# Patient Record
Sex: MALE | Race: WHITE | Employment: OTHER | URBAN - METROPOLITAN AREA
[De-identification: names, ages, dates, MRNs, and addresses within clinical notes are randomized per-mention and may not be internally consistent; named-entity substitution may affect disease eponyms.]

---

## 2017-02-05 ENCOUNTER — OFFICE VISIT (OUTPATIENT)
Dept: URGENT CARE | Facility: RETAIL CLINIC | Age: 52
End: 2017-02-05
Payer: COMMERCIAL

## 2017-02-05 VITALS
SYSTOLIC BLOOD PRESSURE: 146 MMHG | DIASTOLIC BLOOD PRESSURE: 87 MMHG | OXYGEN SATURATION: 97 % | TEMPERATURE: 98.6 F | HEART RATE: 74 BPM

## 2017-02-05 DIAGNOSIS — R05.9 COUGH: Primary | ICD-10-CM

## 2017-02-05 DIAGNOSIS — J20.9 ACUTE BRONCHITIS WITH SYMPTOMS > 10 DAYS: ICD-10-CM

## 2017-02-05 PROCEDURE — 99213 OFFICE O/P EST LOW 20 MIN: CPT | Performed by: NURSE PRACTITIONER

## 2017-02-05 RX ORDER — CODEINE PHOSPHATE AND GUAIFENESIN 10; 100 MG/5ML; MG/5ML
1-2 SOLUTION ORAL EVERY 4 HOURS PRN
Qty: 120 ML | Refills: 0 | Status: SHIPPED | OUTPATIENT
Start: 2017-02-05 | End: 2017-08-14

## 2017-02-05 RX ORDER — AZITHROMYCIN 250 MG/1
TABLET, FILM COATED ORAL
Qty: 6 TABLET | Refills: 0 | Status: SHIPPED | OUTPATIENT
Start: 2017-02-05 | End: 2017-02-10

## 2017-02-05 NOTE — MR AVS SNAPSHOT
After Visit Summary   2/5/2017    Da Akins    MRN: [id number]           Patient Information     Date Of Birth          1965        Visit Information        Provider Department      2/5/2017 2:20 PM Tobin Valente APRN [facility]        Today's Diagnoses     Cough    -  1     Acute bronchitis with symptoms > 10 days            Follow-ups after your visit        Who to contact     You can reach your care team any time of the day by calling [phone].  Notification of test results:  If you have an abnormal lab result, we will notify you by phone as soon as possible.         Additional Information About Your Visit        MyChart Information     [facility] gives you secure access to your electronic health record. If you see a primary care provider, you can also send messages to your care team and make appointments. If you have questions, please call your primary care clinic.  If you do not have a primary care provider, please call [phone] and they will assist you.        Care EveryWhere ID     This is your Care EveryWhere ID. This could be used by other organizations to access your [place] medical records  [id number]        Your Vitals Were     Pulse Temperature Pulse Oximetry             74 98.6  F (37  C) (Oral) 97%          Blood Pressure from Last 3 Encounters:   02/05/17 146/87   04/15/16 130/94   11/30/15 132/78    Weight from Last 3 Encounters:   04/15/16 205 lb 4.8 oz (93.123 kg)   11/30/15 210 lb 8 oz (95.482 kg)              Today, you had the following     No orders found for display         Today's Medication Changes          These changes are accurate as of: 2/5/17  3:12 PM.  If you have any questions, ask your nurse or doctor.               Start taking these medicines.        Dose/Directions    azithromycin 250 MG tablet   Commonly known as:  ZITHROMAX   Used for:  Acute bronchitis with symptoms > 10 days   Started by:  Tobin Valente APRN  CNP        Take 2 tablets today, then take 1 tablet for the next 4 days.   Quantity:  6 tablet   Refills:  0       guaiFENesin-codeine 100-10 MG/5ML Soln solution   Commonly known as:  ROBITUSSIN AC   Used for:  Cough   Started by:  Tobin Valente APRN CNP        Dose:  1-2 tsp.   Take 5-10 mLs by mouth every 4 hours as needed for cough   Quantity:  120 mL   Refills:  0            Where to get your medicines      These medications were sent to [address] - [address]  [address], [facility] [address]     Phone:  [phone]    - azithromycin 250 MG tablet      Some of these will need a paper prescription and others can be bought over the counter.  Ask your nurse if you have questions.     Bring a paper prescription for each of these medications    - guaiFENesin-codeine 100-10 MG/5ML Soln solution             Primary Care Provider Office Phone #    [facility] [phone]       No address on file        Thank you!     Thank you for choosing [facility]  for your care. Our goal is always to provide you with excellent care. Hearing back from our patients is one way we can continue to improve our services. Please take a few minutes to complete the written survey that you may receive in the mail after your visit with us. Thank you!             Your Updated Medication List - Protect others around you: Learn how to safely use, store and throw away your medicines at www.disposemymeds.org.          This list is accurate as of: 2/5/17  3:12 PM.  Always use your most recent med list.                   Brand Name Dispense Instructions for use    azithromycin 250 MG tablet    ZITHROMAX    6 tablet    Take 2 tablets today, then take 1 tablet for the next 4 days.       guaiFENesin-codeine 100-10 MG/5ML Soln solution    ROBITUSSIN AC    120 mL    Take 5-10 mLs by mouth every 4 hours as needed for cough

## 2017-02-05 NOTE — PROGRESS NOTES
SUBJECTIVE:  Da Akins is a 51 year old male who presents to the clinic today with a chief complaint of cough  for 2 week(s).  His cough is described as productive of yellow sputum and spasmodic.    The patient's symptoms are mild and moderate and not changing over the course of time.  Associated symptoms include rhinorrhea. The patient's symptoms are exacerbated by no particular triggers  Patient has been using OTC cough suppressants  to improve symptoms.    Past Medical History   Diagnosis Date     Healthy adult      Current Outpatient Prescriptions   Medication Sig Dispense Refill     azithromycin (ZITHROMAX) 250 MG tablet Take 2 tablets today, then take 1 tablet for the next 4 days. 6 tablet 0     guaiFENesin-codeine (ROBITUSSIN AC) 100-10 MG/5ML SOLN solution Take 5-10 mLs by mouth every 4 hours as needed for cough 120 mL 0     History   Smoking status     Never Smoker    Smokeless tobacco     Never Used       ROS  Review of systems negative except as stated above.    OBJECTIVE:  /87 mmHg  Pulse 74  Temp(Src) 98.6  F (37  C) (Oral)  SpO2 97%  GENERAL APPEARANCE: alert, mild distress, moderate distress and cooperative  EYES: EOMI,  PERRL, conjunctiva clear  HENT: ear canals and TM's normal.  Nose and mouth without ulcers, erythema or lesions. Halitosis noted.  NECK: bilateral anterior cervical adenopathy  RESP: lungs clear to auscultation - no rales, rhonchi or wheezes  RESP: decreased breath sounds   CV: regular rates and rhythm, normal S1 S2, no murmur noted  ABDOMEN:  soft, nontender, no HSM or masses and bowel sounds normal  NEURO: Normal strength and tone, sensory exam grossly normal,  normal speech and mentation  SKIN: no suspicious lesions or rashes    ASSESSMENT:    Cough [R05]  Acute bronchitis with symptoms > 10 days [J20.9]    PLAN:  azithromycin (ZITHROMAX) 250 MG tablet  guaiFENesin-codeine (ROBITUSSIN AC) 100-10 MG/5ML SOLN solution  Get plenty of rest & drink plenty of fluids (mainly  water).  Take OTC, or medications prescribed to treat symptoms.  Mucinex is product known to help loosen congestion (generics are available.).   Dark Honey, such as Newman Wheat Honey has been shown to be helpful in cough management.  Avoid smoke (cigarettes or fireplace/wood burning stoves).  If you develop trouble breathing, swallowing or cough-up blood, immediately go to ER.  Using a vaporizer, humidifier, or steam from hot water to add moisture to the air can help  Follow-up with primary care provider if not improving with in 3 days or symptoms worsen.  A cough may last up to 2 weeks.    Tobin AMEZCUA, MSN, Family NP-C  [facility]  February 5, 2017

## 2017-08-14 ENCOUNTER — HOSPITAL ENCOUNTER (OUTPATIENT)
Dept: ULTRASOUND IMAGING | Facility: CLINIC | Age: 52
Discharge: HOME | End: 2017-08-14
Attending: FAMILY MEDICINE | Admitting: FAMILY MEDICINE
Payer: COMMERCIAL

## 2017-08-14 ENCOUNTER — HOSPITAL ENCOUNTER (OUTPATIENT)
Dept: GENERAL RADIOLOGY | Facility: CLINIC | Age: 52
Discharge: HOME | End: 2017-08-14
Attending: FAMILY MEDICINE | Admitting: FAMILY MEDICINE
Payer: COMMERCIAL

## 2017-08-14 ENCOUNTER — OFFICE VISIT (OUTPATIENT)
Dept: FAMILY MEDICINE | Facility: CLINIC | Age: 52
End: 2017-08-14
Payer: COMMERCIAL

## 2017-08-14 VITALS
BODY MASS INDEX: 32.3 KG/M2 | SYSTOLIC BLOOD PRESSURE: 146 MMHG | DIASTOLIC BLOOD PRESSURE: 98 MMHG | TEMPERATURE: 95.1 F | RESPIRATION RATE: 20 BRPM | WEIGHT: 201 LBS | HEIGHT: 66 IN | OXYGEN SATURATION: 97 % | HEART RATE: 78 BPM

## 2017-08-14 DIAGNOSIS — Q53.112 UNILATERAL INGUINAL TESTIS: ICD-10-CM

## 2017-08-14 DIAGNOSIS — M25.551 HIP PAIN, RIGHT: ICD-10-CM

## 2017-08-14 DIAGNOSIS — M25.551 HIP PAIN, RIGHT: Primary | ICD-10-CM

## 2017-08-14 DIAGNOSIS — B35.6 TINEA CRURIS: ICD-10-CM

## 2017-08-14 PROCEDURE — 99214 OFFICE O/P EST MOD 30 MIN: CPT | Performed by: FAMILY MEDICINE

## 2017-08-14 PROCEDURE — 72170 X-RAY EXAM OF PELVIS: CPT | Mod: TC

## 2017-08-14 PROCEDURE — 93976 VASCULAR STUDY: CPT

## 2017-08-14 RX ORDER — KETOCONAZOLE 20 MG/G
CREAM TOPICAL 2 TIMES DAILY
Qty: 15 G | Refills: 1 | Status: SHIPPED | OUTPATIENT
Start: 2017-08-14 | End: 2019-03-29

## 2017-08-14 RX ORDER — NAPROXEN 500 MG/1
500 TABLET ORAL 2 TIMES DAILY PRN
Qty: 60 TABLET | Refills: 0 | Status: SHIPPED | OUTPATIENT
Start: 2017-08-14 | End: 2017-09-10

## 2017-08-14 ASSESSMENT — PAIN SCALES - GENERAL: PAINLEVEL: MODERATE PAIN (4)

## 2017-08-14 NOTE — NURSING NOTE
"Chief Complaint   Patient presents with     Groin Pain       Initial BP (!) 146/98  Pulse 78  Temp 95.1  F (35.1  C) (Tympanic)  Resp 20  Ht 5' 6\" (1.676 m)  Wt 201 lb (91.2 kg)  SpO2 97%  BMI 32.44 kg/m2 Estimated body mass index is 32.44 kg/(m^2) as calculated from the following:    Height as of this encounter: 5' 6\" (1.676 m).    Weight as of this encounter: 201 lb (91.2 kg).  Medication Reconciliation: complete   ................Charbel Purvis LPN,   August 14, 2017,      7:53 AM,   [facility]    "

## 2017-08-14 NOTE — PROGRESS NOTES
SUBJECTIVE:                                                    aD Akins is a 52 year old male who presents to clinic today for the following health issues:      RIGHT GROIN PAIN/HERNIA      Duration: 6 months    Description (location/character/radiation): pain upper right groin    Intensity:  moderate    Accompanying signs and symptoms: hernia (per DOT phys 2 mo ago)    History (similar episodes/previous evaluation): None    Precipitating or alleviating factors: none    Therapies tried and outcome:  ibuprofen helps     Da is here with his wife for the right groin pain in the last 6 months and it is getting worse.  Had a DOT physical in 2 months ago, umbilical hernia noted - not sure it is related to the groin pain. Stated that he passed everything else for his DOT.   Started having the groin pain about 6 months ago. Localized to the right side. Comes and goes, stronger at times. Sleeping on the right side makes it worse; improve when lays on the left side. Not bother with lifting or straining activities.  No mass appreciated on the groin.  Activities involved  with twisting the hip or lifting the leg would make it worse. Not able bike due to pain.  No unusual activities or injury.  No chronic back pain.  Pain radiates to knees at times.  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  No problem with walking. Hurts more with extensive walking, especially with going up and down the stairs. No fever or chill.  OTC meds - helps some.  No fever or chill.  Left hips feels stiff.      Problem list and histories reviewed & adjusted, as indicated.  Additional history: as documented    No current outpatient prescriptions on file.     Allergies   Allergen Reactions     Seasonal Allergies        Reviewed and updated as needed this visit by clinical staffTobacco  Allergies  Meds  Med Hx  Soc Hx      Reviewed and updated as needed this visit by Provider         ROS:  Constitutional, HEENT, cardiovascular, pulmonary,  "gi and gu systems are negative, except as otherwise noted.      OBJECTIVE:   BP (!) 146/98  Pulse 78  Temp 95.1  F (35.1  C) (Tympanic)  Resp 20  Ht 5' 6\" (1.676 m)  Wt 201 lb (91.2 kg)  SpO2 97%  BMI 32.44 kg/m2  Body mass index is 32.44 kg/(m^2).  GENERAL: healthy, alert and no distress  RESP: lungs clear to auscultation - no rales, rhonchi or wheezes  CV: regular rate and rhythm, no murmur  ABDOMEN: soft, nontender, no masses and bowel sounds normal   (male): normal male genitalia without lesions or urethral discharge, no hernia.  Rash on right groin consistent with tinea cruris noted. Right testicle was could not palpate.   MS: no gross musculoskeletal defects noted, no edema. Walk without limping.  Both legs are equally in strength.  Knee and lower back exams wee normall.  Right hips is stiff with limited range of motion as compared to the left hip.  Tender with palpation to the right hip joint which also triggered with internal and external rotation of the hips. No tender wit palpation to the trochanter.    NEURO: Normal strength and tone, mentation intact and speech normal. No focal neurological deficit.    Diagnostic Test Results:  No results found for this or any previous visit (from the past 24 hour(s)).    ASSESSMENT/PLAN:   1. Hip pain, right  Most likely due to arthritis. He was informed that he has nor hernia and his pain was not related to his umbilical hernia.  Discussed with him about the nature of the condition.  Normal activities as tolerated.  Naproxen as needed.  Xray of the hips today. If no acute change, will consider PT.     - XR Pelvis 1/2 Views; Future  - naproxen (NAPROSYN) 500 MG tablet; Take 1 tablet (500 mg) by mouth 2 times daily as needed for moderate pain  Dispense: 60 tablet; Refill: 0    2. Tinea cruris  Discussed with Da about the nature of the condition.  Inform him that he has a fungal infection which is most likely form the excessive moist from the sweat. Reassure " him that it is benign in nature and is not caused by hygiene problem.  Will have him try Ketoconazole cream twice a day.  Call in if not improve or worse.  He feels comfortable with the plan and all of his questions were answered.    - ketoconazole (NIZORAL) 2 % cream; Apply topically 2 times daily  Dispense: 15 g; Refill: 1    3. Unilateral inguinal testis  Unable to feel the right testicle today.  Chart reviewed, both testicles were appreciated at the last physical. Ultrasound ordered today and will go from there.    - US Testicular & Scrotum w Doppler Ltd; Future      David Ackerman Mai, MD  [facility]

## 2017-08-14 NOTE — MR AVS SNAPSHOT
After Visit Summary   8/14/2017    Da Akins    MRN: [id number]           Patient Information     Date Of Birth          1965        Visit Information        Provider Department      8/14/2017 7:40 AM David Romero MD [facility]        Today's Diagnoses     Hip pain, right    -  1    Tinea cruris        Unilateral inguinal testis           Follow-ups after your visit        Follow-up notes from your care team     Return if symptoms worsen or fail to improve.      Future tests that were ordered for you today     Open Future Orders        Priority Expected Expires Ordered    US Testicular & Scrotum w Doppler Ltd Routine  8/14/2018 8/14/2017    XR Pelvis 1/2 Views Routine 8/14/2017 8/14/2018 8/14/2017            Who to contact     If you have questions or need follow up information about today's clinic visit or your schedule please contact [facility] directly at [phone].  Normal or non-critical lab and imaging results will be communicated to you by MyChart, letter or phone within 4 business days after the clinic has received the results. If you do not hear from us within 7 days, please contact the clinic through wikifoliohart or phone. If you have a critical or abnormal lab result, we will notify you by phone as soon as possible.  Submit refill requests through [employer] or call your pharmacy and they will forward the refill request to us. Please allow 3 business days for your refill to be completed.          Additional Information About Your Visit        MyChart Information     [employer] gives you secure access to your electronic health record. If you see a primary care provider, you can also send messages to your care team and make appointments. If you have questions, please call your primary care clinic.  If you do not have a primary care provider, please call [phone] and they will assist you.        Care EveryWhere ID     This is your Care EveryWhere ID. This  "could be used by other organizations to access your [place] medical records  [id number]        Your Vitals Were     Pulse Temperature Respirations Height Pulse Oximetry BMI (Body Mass Index)    78 95.1  F (35.1  C) (Tympanic) 20 5' 6\" (1.676 m) 97% 32.44 kg/m2       Blood Pressure from Last 3 Encounters:   08/14/17 (!) 146/98   02/05/17 146/87   04/15/16 (!) 130/94    Weight from Last 3 Encounters:   08/14/17 201 lb (91.2 kg)   04/15/16 205 lb 4.8 oz (93.1 kg)   11/30/15 210 lb 8 oz (95.5 kg)                 Today's Medication Changes          These changes are accurate as of: 8/14/17 11:59 PM.  If you have any questions, ask your nurse or doctor.               Start taking these medicines.        Dose/Directions    ketoconazole 2 % cream   Commonly known as:  NIZORAL   Used for:  Tinea cruris   Started by:  David Romero MD        Apply topically 2 times daily   Quantity:  15 g   Refills:  1       naproxen 500 MG tablet   Commonly known as:  NAPROSYN   Used for:  Hip pain, right   Started by:  David Romero MD        Dose:  500 mg   Take 1 tablet (500 mg) by mouth 2 times daily as needed for moderate pain   Quantity:  60 tablet   Refills:  0            Where to get your medicines      These medications were sent to [facility] [address] [facility] - [place], [place] - [address]  [address], [facility] [address]     Phone:  [phone]     ketoconazole 2 % cream    naproxen 500 MG tablet                Primary Care Provider Office Phone #    [facility] [phone]       No address on file        Equal Access to Services     CINDI CORDOBA AH: Sherif Jensen, wakrishnada luqadaha, qaybta kaalmada adeegyada, patrick arguelles. So [facility] [phone].    ATENCIÓN: Si habla español, tiene a abbasi disposición servicios gratuitos de asistencia lingüística. Llame al [phone].    We comply with applicable federal civil rights laws and [place] laws. We do not discriminate on " the basis of race, color, national origin, age, disability sex, sexual orientation or gender identity.            Thank you!     Thank you for choosing [facility]  for your care. Our goal is always to provide you with excellent care. Hearing back from our patients is one way we can continue to improve our services. Please take a few minutes to complete the written survey that you may receive in the mail after your visit with us. Thank you!             Your Updated Medication List - Protect others around you: Learn how to safely use, store and throw away your medicines at www.disposemymeds.org.          This list is accurate as of: 8/14/17 11:59 PM.  Always use your most recent med list.                   Brand Name Dispense Instructions for use Diagnosis    ketoconazole 2 % cream    NIZORAL    15 g    Apply topically 2 times daily    Tinea cruris       naproxen 500 MG tablet    NAPROSYN    60 tablet    Take 1 tablet (500 mg) by mouth 2 times daily as needed for moderate pain    Hip pain, right

## 2017-08-17 ENCOUNTER — MYC MEDICAL ADVICE (OUTPATIENT)
Dept: FAMILY MEDICINE | Facility: CLINIC | Age: 52
End: 2017-08-17

## 2017-08-17 DIAGNOSIS — M25.551 HIP PAIN, RIGHT: Primary | ICD-10-CM

## 2017-08-18 ENCOUNTER — MYC MEDICAL ADVICE (OUTPATIENT)
Dept: FAMILY MEDICINE | Facility: CLINIC | Age: 52
End: 2017-08-18

## 2017-08-18 DIAGNOSIS — M16.11 ARTHRITIS OF RIGHT HIP: Primary | ICD-10-CM

## 2017-08-18 DIAGNOSIS — Z30.09 VISIT FOR VASECTOMY EVALUATION: ICD-10-CM

## 2017-08-18 NOTE — TELEPHONE ENCOUNTER
[employer] message was sent to patient about the vasectomy PT was pended awaiting Dr. Romero's approval.  Last note stated if there was no ACUTE changes PT would be considered.      Jazmin Garcia, CMA

## 2017-09-05 ENCOUNTER — HOSPITAL ENCOUNTER (OUTPATIENT)
Dept: PHYSICAL THERAPY | Facility: CLINIC | Age: 52
Setting detail: THERAPIES SERIES
End: 2017-09-05
Attending: FAMILY MEDICINE
Payer: COMMERCIAL

## 2017-09-05 PROCEDURE — 40000718 ZZHC STATISTIC PT DEPARTMENT ORTHO VISIT: Performed by: PHYSICAL THERAPIST

## 2017-09-05 PROCEDURE — 97110 THERAPEUTIC EXERCISES: CPT | Mod: GP | Performed by: PHYSICAL THERAPIST

## 2017-09-05 PROCEDURE — 97161 PT EVAL LOW COMPLEX 20 MIN: CPT | Mod: GP | Performed by: PHYSICAL THERAPIST

## 2017-09-05 PROCEDURE — 97530 THERAPEUTIC ACTIVITIES: CPT | Mod: GP | Performed by: PHYSICAL THERAPIST

## 2017-09-05 NOTE — PROGRESS NOTES
09/05/17 0800   General Information   Type of Visit Initial OP Ortho PT Evaluation   Start of Care Date 09/05/17   Referring Physician Dr. 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   Orders Evaluate and Treat   Date of Order 08/21/17   Insurance Type Other   Insurance Comments/Visits Authorized [employer] care   Medical Diagnosis Hip pain   Surgical/Medical history reviewed Yes   Precautions/Limitations no known precautions/limitations   Body Part(s)   Body Part(s) Hip   Presentation and Etiology   Pertinent history of current problem (include personal factors and/or comorbidities that impact the POC) Pt reports that he gets pain in his R hip/ groin area.  He noted this because this summer when he went mountain biking it was hard to pedal. On his DOT physical he was found to have a L umbilical hernia.  He notes that when he is driving sometimes he has pain down the anterior thigh and about the knee. Occasionally it will hurt with walking (painful at end of a day at fair), R leg more difficult to flex in sitting. denies T/N,  does endorse the R ankle getting sore at the end of the day,  Getting in/ out of his smaller vehicle is difficult.,  Lying on stomach or R side when sleeping is painful,  occasionally wakes him at night (1x/week)   Impairments A. Pain;D. Decreased ROM;E. Decreased flexibility;F. Decreased strength and endurance;H. Impaired gait   Functional Limitations perform activities of daily living;perform required work activities;perform desired leisure / sports activities   Symptom Location R hip and anterior thigh to the knee, very occasionally ankle   How/Where did it occur From insidious onset   Onset date of current episode/exacerbation 01/05/17   Chronicity Chronic   Pain rating (0-10 point scale) Best (/10);Worst (/10)   Best (/10) 0/10   Worst (/10) 6/10   Pain quality C. Aching;B. Dull   Frequency of pain/symptoms C. With activity   Pain/symptoms are: The  same all the time   Pain/symptoms exacerbated by B. Walking;G. Certain positions;A. Sitting;K. Home tasks;L. Work tasks   Pain/symptoms eased by A. Sitting;E. Changing positions;F. Certain positions;I. OTC medication(s);C. Rest   Progression of symptoms since onset: Unchanged   Current / Previous Interventions   Diagnostic Tests: X-ray   X-ray Results Results   X-ray results OA   Prior Level of Function   Prior Level of Function-Mobility indep   Prior Level of Function-ADLs indep   Functional Level Prior Comment indep   Current Level of Function   Current Community Support Family/friend caregiver   Patient role/employment history A. Employed   Employment Comments Drives a truck   Living environment House/townhome   Home/community accessibility no concerns   Current equipment-Gait/Locomotion None   Current equipment-ADL None   Fall Risk Screen   Fall screen completed by PT   Per patient - Fall 2 or more times in past year? No   Per patient - Fall with injury in past year? No   Is patient a fall risk? No   Hip Objective Findings   Side (if bilateral, select both right and left) Right   Integumentary  no concerns   Posture grossly WFL, decreased lumbar lordosis   Gait/Locomotion decreased truck rotation    Balance/Proprioception (Single Leg Stance) WNL   Lumbar ROM WNL decreased extn but no pain in any direction   Functional Closed Chain Screen squat test negative   Hip/Knee Strength Comments DF and PF 5/5    Femoral Nerve Stretch Test negative   Straight Leg Raise Test laminhfield positive   Scour Test positive   ALEX Test positive   FADIR Test positive   Hip Special Tests Comments negative test for labral tear    Palpation Not TTP at GT or ITB.    Right Hip Flexion PROM pain free   Right Hip Abduction PROM 45 deg L and 15 deg on R   Right Hip ER PROM 36 degrees on L and 26 degrees on R   Right Hip IR PROM 32 deg on L and 20 degrees on R   Right Hip Flexion Strength painful and decreased 3-/5 on R 5/5 on L   Right  Hip Extension Strength able to rise from a squat   Right Knee Flexion Strength 5/5   Right Knee Extension Strength 5/5   Right Prone Quad Flexibility tigh (B)   Planned Therapy Interventions   Planned Therapy Interventions manual therapy;joint mobilization;ROM;strengthening;stretching   Planned Modality Interventions   Planned Modality Interventions Cryotherapy   Planned Modality Interventions Comments PRN   Clinical Impression   Criteria for Skilled Therapeutic Interventions Met yes, treatment indicated   PT Diagnosis R hip pain and decreased ROM  and decreased strength consistent with R hip OA   Influenced by the following impairments pain, decreased ROM, decreased strength   Functional limitations due to impairments pain with fucntional tasks ADLS and IADLS and giat   Clinical Presentation Stable/Uncomplicated   Clinical Presentation Rationale no comorbidities noted, otherwise healthy   Clinical Decision Making (Complexity) Low complexity   Therapy Frequency 2 times/Week   Predicted Duration of Therapy Intervention (days/wks) 8 weeks   Risk & Benefits of therapy have been explained Yes   Patient, Family & other staff in agreement with plan of care Yes   Clinical Impression Comments Pt presents with R hip pain and decreased ROM  and decreased strength consistent with R hip OA. Pt will benefit from skilled PT for instructionin HEP for stretching and strengthenwing as well as neuromuscular re-ed. He will also benefit from skilled application of manual techniques and modalities to decrease pain and imporve mobility   Education Assessment   Preferred Learning Style Listening   Barriers to Learning No barriers   ORTHO GOALS   PT Ortho Eval Goals 1;2;3   Ortho Goal 1   Goal Identifier Sleeping   Goal Description Pt able to report able to sleep through the night in his usual way with out being wakened by hip pain x 2 weeks.    Target Date 11/04/17   Ortho Goal 2   Goal Identifier Getting into/out of a car   Goal  Description Pt able to get into and out of both is truck and his personal vehicle with out hip pain   Target Date 11/04/17   Ortho Goal 3   Goal Identifier Walking   Goal Description Pt able to walk x 1 mile with no increase in hip pain during or after his walk   Target Date 11/04/17   Total Evaluation Time   Total Evaluation Time 50

## 2017-09-10 DIAGNOSIS — M25.551 HIP PAIN, RIGHT: ICD-10-CM

## 2017-09-11 NOTE — TELEPHONE ENCOUNTER
naproxen (NAPROSYN) 500 MG tablet      Last Written Prescription Date: 8/14/17  Last Quantity: 60, # refills: 0  Last Office Visit with G, P or [facility] prescribing provider: 8/14/17       Creatinine   Date Value Ref Range Status   04/15/2016 0.99 0.66 - 1.25 mg/dL Final     Lab Results   Component Value Date    AST 22 04/15/2016     Lab Results   Component Value Date    ALT 46 04/15/2016     BP Readings from Last 3 Encounters:   08/14/17 (!) 146/98   02/05/17 146/87   04/15/16 (!) 130/94

## 2017-09-13 ENCOUNTER — HOSPITAL ENCOUNTER (OUTPATIENT)
Dept: PHYSICAL THERAPY | Facility: CLINIC | Age: 52
Setting detail: THERAPIES SERIES
End: 2017-09-13
Attending: FAMILY MEDICINE
Payer: COMMERCIAL

## 2017-09-13 PROCEDURE — 97110 THERAPEUTIC EXERCISES: CPT | Mod: GP | Performed by: PHYSICAL THERAPIST

## 2017-09-13 PROCEDURE — 97140 MANUAL THERAPY 1/> REGIONS: CPT | Mod: GP | Performed by: PHYSICAL THERAPIST

## 2017-09-13 PROCEDURE — 40000718 ZZHC STATISTIC PT DEPARTMENT ORTHO VISIT: Performed by: PHYSICAL THERAPIST

## 2017-09-13 RX ORDER — NAPROXEN 500 MG/1
TABLET ORAL
Qty: 60 TABLET | Refills: 0 | Status: SHIPPED | OUTPATIENT
Start: 2017-09-13 | End: 2017-10-11

## 2017-09-13 NOTE — TELEPHONE ENCOUNTER
Medication is being filled for 1 time refill only due to:  Patient needs labs Cr+, AST/ ALT. Future labs ordered as listed.  NOtified per comment section of Rx needs for lab work . (Need these checked annually for med use)  Will forward to schedulers to call and set up appt and lab appt......................VENKATA Osorio

## 2017-09-14 NOTE — TELEPHONE ENCOUNTER
Left message for patient to call back and speak with any .  Thank you,  Rocio Tatum  Patient Representative

## 2017-09-18 ENCOUNTER — TELEPHONE (OUTPATIENT)
Dept: FAMILY MEDICINE | Facility: CLINIC | Age: 52
End: 2017-09-18

## 2017-09-18 DIAGNOSIS — Z12.11 SCREENING FOR COLON CANCER: Primary | ICD-10-CM

## 2017-09-19 ENCOUNTER — HOSPITAL ENCOUNTER (OUTPATIENT)
Dept: PHYSICAL THERAPY | Facility: CLINIC | Age: 52
Setting detail: THERAPIES SERIES
End: 2017-09-19
Attending: FAMILY MEDICINE
Payer: COMMERCIAL

## 2017-09-19 ENCOUNTER — MYC MEDICAL ADVICE (OUTPATIENT)
Dept: FAMILY MEDICINE | Facility: CLINIC | Age: 52
End: 2017-09-19

## 2017-09-19 PROCEDURE — 97140 MANUAL THERAPY 1/> REGIONS: CPT | Mod: GP | Performed by: PHYSICAL THERAPIST

## 2017-09-19 PROCEDURE — 97110 THERAPEUTIC EXERCISES: CPT | Mod: GP | Performed by: PHYSICAL THERAPIST

## 2017-09-19 PROCEDURE — 40000718 ZZHC STATISTIC PT DEPARTMENT ORTHO VISIT: Performed by: PHYSICAL THERAPIST

## 2017-09-19 NOTE — TELEPHONE ENCOUNTER
Patient returned call and informed of appointment. Asked that we also send a MyChart message of the appointment as well. Gema Meraz LPN

## 2017-09-19 NOTE — TELEPHONE ENCOUNTER
Message left to return call to clinic to inform of consult that he was requesting. Please inform of appointment with Urology, Dr. Escalante being scheduled on Wednesday 9/27 at 11:30 am at the [facility].  Gema Meraz LPN

## 2017-09-21 ENCOUNTER — HOSPITAL ENCOUNTER (OUTPATIENT)
Dept: PHYSICAL THERAPY | Facility: CLINIC | Age: 52
Setting detail: THERAPIES SERIES
End: 2017-09-21
Attending: FAMILY MEDICINE
Payer: COMMERCIAL

## 2017-09-21 PROCEDURE — 97140 MANUAL THERAPY 1/> REGIONS: CPT | Mod: GP | Performed by: PHYSICAL THERAPIST

## 2017-09-21 PROCEDURE — 40000718 ZZHC STATISTIC PT DEPARTMENT ORTHO VISIT: Performed by: PHYSICAL THERAPIST

## 2017-09-21 PROCEDURE — 97110 THERAPEUTIC EXERCISES: CPT | Mod: GP | Performed by: PHYSICAL THERAPIST

## 2017-09-26 ENCOUNTER — HOSPITAL ENCOUNTER (OUTPATIENT)
Dept: PHYSICAL THERAPY | Facility: CLINIC | Age: 52
Setting detail: THERAPIES SERIES
End: 2017-09-26
Attending: FAMILY MEDICINE
Payer: COMMERCIAL

## 2017-09-26 PROCEDURE — 97140 MANUAL THERAPY 1/> REGIONS: CPT | Mod: GP | Performed by: PHYSICAL THERAPIST

## 2017-09-26 PROCEDURE — 97110 THERAPEUTIC EXERCISES: CPT | Mod: GP | Performed by: PHYSICAL THERAPIST

## 2017-09-26 PROCEDURE — 40000718 ZZHC STATISTIC PT DEPARTMENT ORTHO VISIT: Performed by: PHYSICAL THERAPIST

## 2017-09-27 ENCOUNTER — HOSPITAL ENCOUNTER (OUTPATIENT)
Dept: PHYSICAL THERAPY | Facility: CLINIC | Age: 52
Setting detail: THERAPIES SERIES
End: 2017-09-27
Attending: FAMILY MEDICINE
Payer: COMMERCIAL

## 2017-09-27 ENCOUNTER — OFFICE VISIT (OUTPATIENT)
Dept: UROLOGY | Facility: OTHER | Age: 52
End: 2017-09-27
Payer: COMMERCIAL

## 2017-09-27 VITALS
DIASTOLIC BLOOD PRESSURE: 78 MMHG | WEIGHT: 211.25 LBS | BODY MASS INDEX: 33.95 KG/M2 | SYSTOLIC BLOOD PRESSURE: 142 MMHG | HEIGHT: 66 IN

## 2017-09-27 DIAGNOSIS — Z30.09 STERILIZATION CONSULT: Primary | ICD-10-CM

## 2017-09-27 DIAGNOSIS — R03.0 ELEVATED BLOOD PRESSURE READING WITHOUT DIAGNOSIS OF HYPERTENSION: ICD-10-CM

## 2017-09-27 PROCEDURE — 99203 OFFICE O/P NEW LOW 30 MIN: CPT | Performed by: UROLOGY

## 2017-09-27 PROCEDURE — 40000718 ZZHC STATISTIC PT DEPARTMENT ORTHO VISIT: Performed by: PHYSICAL THERAPIST

## 2017-09-27 PROCEDURE — 97140 MANUAL THERAPY 1/> REGIONS: CPT | Mod: GP | Performed by: PHYSICAL THERAPIST

## 2017-09-27 PROCEDURE — 97110 THERAPEUTIC EXERCISES: CPT | Mod: GP | Performed by: PHYSICAL THERAPIST

## 2017-09-27 NOTE — PATIENT INSTRUCTIONS
Your Vasectomy is scheduled 10/25/17 at 8:15.  Please call [phone] if you need to reschedule this appointment or if you have any question.   Please stop naproxen on 10/17/17.    Preparation for Vasectomy:  Shave the hair away from the base of your penis and around your testicles.  Wear snug underwear the day of the vasectomy to support your testicles.  Do not take aspirin, ibuprofen, advil, motrin, aleve products one week prior to your vasectomy.  Arrange for a  if you take any medication for relaxation from the physician.      General Vasectomy Information    Vasectomy is a surgery.  If it is successful, it will be impossible for you to ever father children.  The following information regards the male sterilization done by an operation called a vasectomy.  This is done in the physician's office.    The operation done to sterilize the male is easier, safer and much less expensive than the operation done to sterilize the woman.    Sterilization should be considered permanent.  For most males, once the operation is done, it can never me undone.  There are attempts made occasionally to reconnect the tubes that have been cut during the procedure, but this is very difficult and expensive and works only about 50-70% of the time.  In order for any of the physicians in our clinic to do a vasectomy, we require that you consider this a permanent form a sterilization.    A vasectomy can be done at any time, but it is best to think about having it done when you can take at least one day off from work and any excess activities.    Your decision to have a vasectomy should only be made with the following facts clear in mind.    1. First, a vasectomy is only one of several means of birth control.  Many form of temporary contraception are available.  If you have any questions about other methods, please discuss this with your physician.    2. A vasectomy may be unsuccessful in approximately one out of 1000 couples per  year.  This occurs when the tubes which are cut during the procedure reconnect themselves.  Sterility cannot be guaranteed.    3. You should be aware that it is the current belief of the medical profession that a vasectomy procedure does not alter a male physically, physiologically or sexually.  Because each person is a unique individual, there is always the possibility of an adverse phychiatric reaction.  This can be best avoided by being very comfortable in your own mind that you want to have this done, and that you do not want to father any children in the future.  If this is not clear in your mind, this should be further discussed with your physician.    4. You will not notice a change in the volume of your ejaculate since sperm is a very small amount of the semen and it is only the sperm that is stopped from entering the ejaculate after a vasectomy.  Your prostate and seminal vesicle glands really supply most of the semen and this is not at all decreased after a vasectomy.  Also there is no effect on the male hormones.    5. You do not become sterile immediately following a vasectomy due to the fact that there is still sperm remaining in your system that must be eliminated by ejaculation.  For this reason, your sexual partner could still become pregnant for a period of time following the vasectomy operation.  It is necessary that contraceptive measures be used until you receive confirmation from your physician that you are sterile.  It takes approximately 12 ejaculations to clear the semen of sperm, but this can differ in different men.  For this reason, it is very important that your semen be checked for sperm before you are considered sterile, and this should be done approximately 12 weeks after your vasectomy.      6. Vasectomy has risks and benefits.  Among the risks are possible complications resulting from the procedure.  These risks include but are not limited to:   A.  Bleeding, infection, or hematoma  occuring during or in the recovery period   from the procedure.   B.  Sperm granuloma or a small pea to walnut sized lump which is a collection of   scar tissue and sperm in your scrotal sack and remains permanently   C.  There may be an increased risk of prostate cancer although the data is   unclear.

## 2017-09-27 NOTE — NURSING NOTE
"Chief Complaint   Patient presents with     Consult     vasectomy        Initial /78 (BP Location: Right arm, Patient Position: Chair, Cuff Size: Adult Regular)  Ht 5' 6\" (1.676 m)  Wt 211 lb 4 oz (95.8 kg)  BMI 34.1 kg/m2 Estimated body mass index is 34.1 kg/(m^2) as calculated from the following:    Height as of this encounter: 5' 6\" (1.676 m).    Weight as of this encounter: 211 lb 4 oz (95.8 kg).  Medication Reconciliation: complete       Mariposa Centeno CMA         "

## 2017-09-27 NOTE — MR AVS SNAPSHOT
After Visit Summary   9/27/2017    Da Akins    MRN: [id number]           Patient Information     Date Of Birth          1965        Visit Information        Provider Department      9/27/2017 11:30 AM Praveen Esclaante MD [facility]        Today's Diagnoses     Sterilization consult    -  1      Care Instructions    Your Vasectomy is scheduled 10/25/17 at 8:15.  Please call [phone] if you need to reschedule this appointment or if you have any question.   Please stop naproxen on 10/17/17.    Preparation for Vasectomy:  Shave the hair away from the base of your penis and around your testicles.  Wear snug underwear the day of the vasectomy to support your testicles.  Do not take aspirin, ibuprofen, advil, motrin, aleve products one week prior to your vasectomy.  Arrange for a  if you take any medication for relaxation from the physician.      General Vasectomy Information    Vasectomy is a surgery.  If it is successful, it will be impossible for you to ever father children.  The following information regards the male sterilization done by an operation called a vasectomy.  This is done in the physician's office.    The operation done to sterilize the male is easier, safer and much less expensive than the operation done to sterilize the woman.    Sterilization should be considered permanent.  For most males, once the operation is done, it can never me undone.  There are attempts made occasionally to reconnect the tubes that have been cut during the procedure, but this is very difficult and expensive and works only about 50-70% of the time.  In order for any of the physicians in our clinic to do a vasectomy, we require that you consider this a permanent form a sterilization.    A vasectomy can be done at any time, but it is best to think about having it done when you can take at least one day off from work and any excess activities.    Your decision to have a vasectomy  should only be made with the following facts clear in mind.    1. First, a vasectomy is only one of several means of birth control.  Many form of temporary contraception are available.  If you have any questions about other methods, please discuss this with your physician.    2. A vasectomy may be unsuccessful in approximately one out of 1000 couples per year.  This occurs when the tubes which are cut during the procedure reconnect themselves.  Sterility cannot be guaranteed.    3. You should be aware that it is the current belief of the medical profession that a vasectomy procedure does not alter a male physically, physiologically or sexually.  Because each person is a unique individual, there is always the possibility of an adverse phychiatric reaction.  This can be best avoided by being very comfortable in your own mind that you want to have this done, and that you do not want to father any children in the future.  If this is not clear in your mind, this should be further discussed with your physician.    4. You will not notice a change in the volume of your ejaculate since sperm is a very small amount of the semen and it is only the sperm that is stopped from entering the ejaculate after a vasectomy.  Your prostate and seminal vesicle glands really supply most of the semen and this is not at all decreased after a vasectomy.  Also there is no effect on the male hormones.    5. You do not become sterile immediately following a vasectomy due to the fact that there is still sperm remaining in your system that must be eliminated by ejaculation.  For this reason, your sexual partner could still become pregnant for a period of time following the vasectomy operation.  It is necessary that contraceptive measures be used until you receive confirmation from your physician that you are sterile.  It takes approximately 12 ejaculations to clear the semen of sperm, but this can differ in different men.  For this reason, it is  very important that your semen be checked for sperm before you are considered sterile, and this should be done approximately 12 weeks after your vasectomy.      6. Vasectomy has risks and benefits.  Among the risks are possible complications resulting from the procedure.  These risks include but are not limited to:   A.  Bleeding, infection, or hematoma occuring during or in the recovery period   from the procedure.   B.  Sperm granuloma or a small pea to walnut sized lump which is a collection of   scar tissue and sperm in your scrotal sack and remains permanently   C.  There may be an increased risk of prostate cancer although the data is   unclear.            Follow-ups after your visit        Your next 10 appointments already scheduled     Oct 04, 2017  8:00 AM CDT   Ortho Treatment with Karen Concepcion PT   [facility] ([facility])    [address] [place] [address] [address] [place] [address]   [phone]            Oct 11, 2017  8:00 AM CDT   PHYSICAL with David Ackerman Mai, MD   [facility]    [address] [address]   [phone]            Oct 11, 2017  8:45 AM CDT   Ortho Treatment with Karen Concepcion PT   [facility] ([facility])    [address] [place] [address]   [phone]            Oct 18, 2017  8:00 AM CDT   Ortho Treatment with Karen Concepcion PT   [facility] ([facility])    [address] [place] [address]   [phone]            Oct 23, 2017   Procedure with Clay Brown MD   [facility] ([facility])    [address] [address]   [phone]            Oct 25, 2017  8:15 AM CDT   Return Visit with Parveen Escalante MD   [facility]    [address] [facility] [id number] [phone]              Who to  "contact     If you have questions or need follow up information about today's clinic visit or your schedule please contact [facility] directly at [phone].  Normal or non-critical lab and imaging results will be communicated to you by MyChart, letter or phone within 4 business days after the clinic has received the results. If you do not hear from us within 7 days, please contact the clinic through MyChart or phone. If you have a critical or abnormal lab result, we will notify you by phone as soon as possible.  Submit refill requests through [employer] or call your pharmacy and they will forward the refill request to us. Please allow 3 business days for your refill to be completed.          Additional Information About Your Visit        "[employer] Information     [employer] gives you secure access to your electronic health record. If you see a primary care provider, you can also send messages to your care team and make appointments. If you have questions, please call your primary care clinic.  If you do not have a primary care provider, please call [phone] and they will assist you.        Care EveryWhere ID     This is your Care EveryWhere ID. This could be used by other organizations to access your [facility] medical records  [id number]        Your Vitals Were     Height BMI (Body Mass Index)                1.676 m (5' 6\") 34.1 kg/m2           Blood Pressure from Last 3 Encounters:   09/27/17 142/78   08/14/17 (!) 146/98   02/05/17 146/87    Weight from Last 3 Encounters:   09/27/17 95.8 kg (211 lb 4 oz)   08/14/17 91.2 kg (201 lb)   04/15/16 93.1 kg (205 lb 4.8 oz)              Today, you had the following     No orders found for display       Primary Care Provider Office Phone # Fax #    David Ackerman Mai, [phone]        [facility] [address] [place] [address]        Equal Access to Services     CINDI CORDOBA AH: Sherif Jensen, abby barraza, qaybta shandra avendaño, " patrick sowmerary mitalisamanta cedeno'aan ah. So [facility] [phone].    ATENCIÓN: Si habla sherrie, tiene a abbasi disposición servicios gratuitos de asistencia lingüística. Juan al [phone].    We comply with applicable federal civil rights laws and [place] laws. We do not discriminate on the basis of race, color, national origin, age, disability sex, sexual orientation or gender identity.            Thank you!     Thank you for choosing [facility]  for your care. Our goal is always to provide you with excellent care. Hearing back from our patients is one way we can continue to improve our services. Please take a few minutes to complete the written survey that you may receive in the mail after your visit with us. Thank you!             Your Updated Medication List - Protect others around you: Learn how to safely use, store and throw away your medicines at www.disposemymeds.org.          This list is accurate as of: 9/27/17 12:05 PM.  Always use your most recent med list.                   Brand Name Dispense Instructions for use Diagnosis    ketoconazole 2 % cream    NIZORAL    15 g    Apply topically 2 times daily    Tinea cruris       naproxen 500 MG tablet    NAPROSYN    60 tablet    TAKE 1 TABLET BY MOUTH 2 TIMES DAILY AS NEEDED FOR MODERATE PAIN    Hip pain, right

## 2017-09-27 NOTE — PROGRESS NOTES
Vasectomy Consult    Reason for visit:   Discuss as a method of birth control/sterilization per Dr. Romero's request for consultation.  Dr. Romero had problems finding patient's vas deferens.  He is interested in vasectomy scrotally.  Patient has 3 children and desires sterilization.  Does not want to use condom or having partners on birth control pills.  He has no erections problem.  He has no urinary complaints.    Current Outpatient Prescriptions   Medication Sig Dispense Refill     naproxen (NAPROSYN) 500 MG tablet TAKE 1 TABLET BY MOUTH 2 TIMES DAILY AS NEEDED FOR MODERATE PAIN 60 tablet 0     ketoconazole (NIZORAL) 2 % cream Apply topically 2 times daily 15 g 1     Allergies   Allergen Reactions     Seasonal Allergies      Past Medical History:   Diagnosis Date     Healthy adult      Past Surgical History:   Procedure Laterality Date     ANKLE SURGERY       gnaglion cyst remove Right       Family History   Problem Relation Age of Onset     DIABETES Father      Hypertension Father      CEREBROVASCULAR DISEASE Father      Colon Cancer Father 50     Coronary Artery Disease No family hx of      Hyperlipidemia No family hx of      Colon Cancer Mother 72     CEREBROVASCULAR DISEASE Maternal Grandfather      Social History     Social History     Marital status:      Spouse name: N/A     Number of children: N/A     Years of education: N/A     Social History Main Topics     Smoking status: Never Smoker     Smokeless tobacco: Never Used     Alcohol use No     Drug use: No     Sexual activity: Yes     Partners: Female      Comment:  - 3 children.     Other Topics Concern     None     Social History Narrative       REVIEW OF SYSTEMS  =================  C: NEGATIVE for fever, chills, change in weight  I: NEGATIVE for worrisome rashes, moles or lesions  E/M: NEGATIVE for ear, mouth and throat problems  R: NEGATIVE for significant cough or SHORTNESS OF BREATH  CV:  NEGATIVE for chest pain, palpitations or peripheral  "edema  GI: NEGATIVE for nausea, abdominal pain, heartburn, or change in bowel habits  NEURO: NEGATIVE numbness/weakness  : see HPI  PSYCH: NEGATIVE depression/anxiety  LYmph: no new enlarged lymph nodes  Ortho: no new trauma/movements      Physical Exam:  /78 (BP Location: Right arm, Patient Position: Chair, Cuff Size: Adult Regular)  Ht 5' 6\" (1.676 m)  Wt 211 lb 4 oz (95.8 kg)  BMI 34.1 kg/m2   Patient is pleasant, in no acute distress, good general condition.  HEENT:  Normalcephalic, atraumatic  Lung: no evidence of respiratory distress    Abdomen: Soft, nondistended, non tender. No masses. No rebound or guarding.   Exam: penis no d/c testis no masses bilateral vas palpated no scrotal lesion  Skin: Warm and dry.  No redness.  Neuro: grossly normal  Psych normal mood and affect  Musculoskeletal  moving all extremities        Discussed    That vasectomy is permanent method of birth control.    That vasectomy can fail due to recanalization of the vas even many months/years later.    That he needs 2 negative sperm checks to be considered sterile    That there are other methods that are not permanent and also that the sperm can be frozen for later use.    How the technique is performed, risks of infection, bleeding, damage to the testes vessels and testes atrophy    Long term complication such as chronic and difficult to treat testes pain and questionable increase incidence of prostate cancer    That the procedure can be done at the clinic or hospital OR        Plan:    Stop Aspirin  Will schedule Vasectomy in the future    Elevated BP: Patient to follow up with Primary Care provider regarding elevated blood pressure.        "

## 2017-10-04 ENCOUNTER — HOSPITAL ENCOUNTER (OUTPATIENT)
Dept: PHYSICAL THERAPY | Facility: CLINIC | Age: 52
Setting detail: THERAPIES SERIES
End: 2017-10-04
Attending: FAMILY MEDICINE
Payer: COMMERCIAL

## 2017-10-04 PROCEDURE — 97110 THERAPEUTIC EXERCISES: CPT | Mod: GP | Performed by: PHYSICAL THERAPIST

## 2017-10-04 PROCEDURE — 40000718 ZZHC STATISTIC PT DEPARTMENT ORTHO VISIT: Performed by: PHYSICAL THERAPIST

## 2017-10-04 PROCEDURE — 97140 MANUAL THERAPY 1/> REGIONS: CPT | Mod: GP | Performed by: PHYSICAL THERAPIST

## 2017-10-11 ENCOUNTER — OFFICE VISIT (OUTPATIENT)
Dept: FAMILY MEDICINE | Facility: CLINIC | Age: 52
End: 2017-10-11
Payer: COMMERCIAL

## 2017-10-11 ENCOUNTER — HOSPITAL ENCOUNTER (OUTPATIENT)
Dept: PHYSICAL THERAPY | Facility: CLINIC | Age: 52
Setting detail: THERAPIES SERIES
End: 2017-10-11
Attending: FAMILY MEDICINE
Payer: COMMERCIAL

## 2017-10-11 VITALS
DIASTOLIC BLOOD PRESSURE: 100 MMHG | BODY MASS INDEX: 33.67 KG/M2 | HEIGHT: 66 IN | HEART RATE: 60 BPM | WEIGHT: 209.5 LBS | SYSTOLIC BLOOD PRESSURE: 156 MMHG | TEMPERATURE: 96.4 F

## 2017-10-11 DIAGNOSIS — Z00.00 ENCOUNTER FOR ROUTINE ADULT HEALTH EXAMINATION WITHOUT ABNORMAL FINDINGS: Primary | ICD-10-CM

## 2017-10-11 DIAGNOSIS — I10 BENIGN ESSENTIAL HYPERTENSION: ICD-10-CM

## 2017-10-11 DIAGNOSIS — E66.09 NON MORBID OBESITY DUE TO EXCESS CALORIES: ICD-10-CM

## 2017-10-11 DIAGNOSIS — M25.551 HIP PAIN, RIGHT: ICD-10-CM

## 2017-10-11 LAB
ALBUMIN SERPL-MCNC: 3.9 G/DL (ref 3.4–5)
ALP SERPL-CCNC: 76 U/L (ref 40–150)
ALT SERPL W P-5'-P-CCNC: 33 U/L (ref 0–70)
ANION GAP SERPL CALCULATED.3IONS-SCNC: 8 MMOL/L (ref 3–14)
AST SERPL W P-5'-P-CCNC: 19 U/L (ref 0–45)
BILIRUB SERPL-MCNC: 0.6 MG/DL (ref 0.2–1.3)
BUN SERPL-MCNC: 11 MG/DL (ref 7–30)
CALCIUM SERPL-MCNC: 8.7 MG/DL (ref 8.5–10.1)
CHLORIDE SERPL-SCNC: 107 MMOL/L (ref 94–109)
CHOLEST SERPL-MCNC: 184 MG/DL
CO2 SERPL-SCNC: 28 MMOL/L (ref 20–32)
CREAT SERPL-MCNC: 0.96 MG/DL (ref 0.66–1.25)
GFR SERPL CREATININE-BSD FRML MDRD: 82 ML/MIN/1.7M2
GLUCOSE SERPL-MCNC: 124 MG/DL (ref 70–99)
HDLC SERPL-MCNC: 37 MG/DL
LDLC SERPL CALC-MCNC: 84 MG/DL
NONHDLC SERPL-MCNC: 147 MG/DL
POTASSIUM SERPL-SCNC: 4 MMOL/L (ref 3.4–5.3)
PROT SERPL-MCNC: 7.6 G/DL (ref 6.8–8.8)
PSA SERPL-ACNC: 2.68 UG/L (ref 0–4)
SODIUM SERPL-SCNC: 143 MMOL/L (ref 133–144)
TRIGL SERPL-MCNC: 317 MG/DL
TSH SERPL DL<=0.005 MIU/L-ACNC: 0.72 MU/L (ref 0.4–4)

## 2017-10-11 PROCEDURE — 80061 LIPID PANEL: CPT | Performed by: FAMILY MEDICINE

## 2017-10-11 PROCEDURE — G0103 PSA SCREENING: HCPCS | Performed by: FAMILY MEDICINE

## 2017-10-11 PROCEDURE — 97140 MANUAL THERAPY 1/> REGIONS: CPT | Mod: GP | Performed by: PHYSICAL THERAPIST

## 2017-10-11 PROCEDURE — 40000718 ZZHC STATISTIC PT DEPARTMENT ORTHO VISIT: Performed by: PHYSICAL THERAPIST

## 2017-10-11 PROCEDURE — 99396 PREV VISIT EST AGE 40-64: CPT | Performed by: FAMILY MEDICINE

## 2017-10-11 PROCEDURE — 36415 COLL VENOUS BLD VENIPUNCTURE: CPT | Performed by: FAMILY MEDICINE

## 2017-10-11 PROCEDURE — 84443 ASSAY THYROID STIM HORMONE: CPT | Performed by: FAMILY MEDICINE

## 2017-10-11 PROCEDURE — 80053 COMPREHEN METABOLIC PANEL: CPT | Performed by: FAMILY MEDICINE

## 2017-10-11 PROCEDURE — 99214 OFFICE O/P EST MOD 30 MIN: CPT | Mod: 25 | Performed by: FAMILY MEDICINE

## 2017-10-11 PROCEDURE — 97110 THERAPEUTIC EXERCISES: CPT | Mod: GP | Performed by: PHYSICAL THERAPIST

## 2017-10-11 RX ORDER — DICLOFENAC SODIUM 75 MG/1
75 TABLET, DELAYED RELEASE ORAL 2 TIMES DAILY PRN
Qty: 60 TABLET | Refills: 1 | Status: SHIPPED | OUTPATIENT
Start: 2017-10-11 | End: 2017-10-18

## 2017-10-11 RX ORDER — HYDROCHLOROTHIAZIDE 25 MG/1
25 TABLET ORAL DAILY
Qty: 30 TABLET | Refills: 1 | Status: SHIPPED | OUTPATIENT
Start: 2017-10-11 | End: 2018-10-23

## 2017-10-11 NOTE — NURSING NOTE
"Chief Complaint   Patient presents with     Physical       Initial There were no vitals taken for this visit. Estimated body mass index is 34.1 kg/(m^2) as calculated from the following:    Height as of 9/27/17: 5' 6\" (1.676 m).    Weight as of 9/27/17: 211 lb 4 oz (95.8 kg).  Medication Reconciliation: complete  "

## 2017-10-11 NOTE — PROGRESS NOTES
Answers for HPI/ROS submitted by the patient on 10/11/2017   Annual Exam:  Getting at least 3 servings of Calcium per day:: Yes  Bi-annual eye exam:: Yes  Dental care twice a year:: Yes  Sleep apnea or symptoms of sleep apnea:: None  Diet:: Regular (no restrictions)  Frequency of exercise:: None  Taking medications regularly:: Yes  Medication side effects:: None  Additional concerns today:: No  PHQ-2 Score: 0

## 2017-10-11 NOTE — PROGRESS NOTES
SUBJECTIVE:   CC: Da Akins is an 52 year old male who presents for preventative health visit.     Physical   Annual:     Getting at least 3 servings of Calcium per day::  Yes    Bi-annual eye exam::  Yes    Dental care twice a year::  Yes    Sleep apnea or symptoms of sleep apnea::  None    Diet::  Regular (no restrictions)    Frequency of exercise::  None    Taking medications regularly::  Yes    Medication side effects::  None    Additional concerns today::  No    Da Akins is an otherwise healthy 52 year old male who is here today for a physical. His only concern is his ongoing right hip pain. He has had the pain for many months. It originates in his right hip and radiates to his anterior thigh. He has been taking naproxen and tylenol for the pain, as well as doing physical therapy. The hip pain is affecting his ability to sleep through the night. Patient is otherwise feeling well. No fever, chills, CP, SOB, HA, dizziness, N/V/D, abdominal pain, urinary symptoms, bowel symptoms or skin changes. His mood is good. No history of STI and denies risk.  No tobacco, alcohol or illicit drug use. He does not exercise regularly due to hip pain and long working hours. Patient is scheduled to undergo colonoscopy and vasectomy at the end of the month.      Today's PHQ-2 Score:   PHQ-2 ( 1999 [employer]) 10/11/2017   Q1: Little interest or pleasure in doing things 0   Q2: Feeling down, depressed or hopeless 0   PHQ-2 Score 0   Q1: Little interest or pleasure in doing things Not at all   Q2: Feeling down, depressed or hopeless Not at all   PHQ-2 Score 0     Abuse: Current or Past(Physical, Sexual or Emotional)- No  Do you feel safe in your environment - Yes    Social History   Substance Use Topics     Smoking status: Never Smoker     Smokeless tobacco: Never Used     Alcohol use No     The patient does not drink >3 drinks per day nor >7 drinks per week.    Last PSA: No results found for: PSA    Reviewed orders with patient.  "Reviewed health maintenance and updated orders accordingly - Yes    Reviewed and updated as needed this visit by clinical staff  Tobacco  Allergies  Meds  Med Hx  Surg Hx  Fam Hx  Soc Hx        Reviewed and updated as needed this visit by Provider  Allergies        Past Medical History:   Diagnosis Date     Healthy adult      Seasonal allergies       Past Surgical History:   Procedure Laterality Date     ANKLE SURGERY       gnaglion cyst remove Right        ROS:  C: NEGATIVE for fever, chills, change in weight  I: NEGATIVE for worrisome rashes, moles or lesions  E: NEGATIVE for vision changes or irritation  ENT: NEGATIVE for ear, mouth and throat problems  R: NEGATIVE for significant cough or SOB  CV: NEGATIVE for chest pain, palpitations or peripheral edema  GI: NEGATIVE for nausea, abdominal pain, heartburn, or change in bowel habits   male: negative for dysuria, hematuria, decreased urinary stream, erectile dysfunction, urethral discharge  M: Positive for right hip pain. Negative for other musculoskeletal pain.  N: NEGATIVE for weakness, dizziness or paresthesias  P: NEGATIVE for changes in mood or affect    OBJECTIVE:   BP (!) 156/100  Pulse 60  Temp 96.4  F (35.8  C) (Tympanic)  Ht 5' 6\" (1.676 m)  Wt 209 lb 8 oz (95 kg)  BMI 33.81 kg/m2    GENERAL: healthy, alert and no distress  EYES: Eyes grossly normal to inspection, PERRL and conjunctivae and sclerae normal  HENT: ear canals and TMs normal.  No nasal congestion.  Oropharynx is pink and moist.  No tender with palpation to the sinuses.  NECK: no adenopathy, no asymmetry or scars and thyroid normal to palpation  RESP: lungs clear to auscultation - no rales, rhonchi or wheezes  CV: regular rate and rhythm, no murmur.  ABDOMEN: soft, no hepatosplenomegaly, no masses and bowel sounds normal.  No tender with palpation.  MS: no gross musculoskeletal defects noted, no edema.  Walk with no limping, normal gait.  All 4 extremities are equally in " strength. Ankle, knees, hips, shoulders, elbows and wrists exams normal.  No tender with palpation to the hips.  No pain with internal or external rotation of the hips.  Normal fine motor skills on fingers.  Back is straight, no lordosis or scoliosis.  No tender with palpation.  SKIN: no suspicious lesions or rashes  NEURO: Normal strength and tone, mentation intact and speech normal.  No focal deficit  PSYCH: mentation appears normal, affect normal/bright  LYMPH: no cervical, supraclavicular or axillary adenopathy.    ASSESSMENT/PLAN:       ICD-10-CM    1. Encounter for routine adult health examination without abnormal findings Z00.00 Comprehensive metabolic panel (BMP + Alb, Alk Phos, ALT, AST, Total. Bili, TP)     Lipid panel reflex to direct LDL     TSH     PSA, screen   2. Non morbid obesity due to excess calories E66.09    3. Hip pain, right M25.551 diclofenac (VOLTAREN) 75 MG EC tablet   4. Benign essential hypertension I10 hydrochlorothiazide (HYDRODIURIL) 25 MG tablet     1. Routine Adult Health Examination  Overall, Da is healthy and doing well.  Declined influenza vaccine today but otherwise UTD.  Topics appropriate for him age discussed include safety issue and healthy diet as well as substance abuse/STD/depression prevention. Recommended daily exercise for at least 30 minutes.  Emphasized on healthy and regular diet with adequate fluid intake and resting. Feels safe at home and at work.  Follow in 1 year.  Lab today as ordered below.  Colonoscopy scheduled to have one later this month.     2. Right hip pain    Patient continues to have right hip pain. Xray on 8/14/17 suggestive of arthritis of right hip. He has been taking naproxen and going to physical therapy with intermittent improvement in his pain. Patient is interested in cortisone injection in the future. Will start patient on diclofenac and discontinue naproxen. Patient plans to follow up in clinic following completion of physical therapy.   "Most likely will need a referral to orthopedics for further evaluation and management.     3. Benign Essential Hypertension    New diagnosis, BP is high today. Reviewed vital sign history and  blood pressure has been intermittently high since last year.   Patient has never been on any medication for this.  Discussed with patient about the nature of the condition and emphasize the importance of having it controlled. Educated patient about the long and short term consequences of uncontrolled HTN as well as the goal for blood pressure of <140/90.  Will start on the HCTZ 25mg Qday and side effects discussed.  Healthy life style modification discussed and encourage to practice healthy diet and regular excercise.  Also recommended weight loss. Also encouraged to avoid high salt diet.  Follow up in 1 month, earlier as needed.    4. Obesity  Discussed with patient about the nature of the condition and its long term and short term effects.  Encourage him to continue with daily aerobic exercise and healthy diet.  Discussed about portioned diet as well.  Recommend him to set a goal of losing 2-4 # a month and work toward that with healthy life style modification.  Discussed with patient the goal for his weight and his BMI.  TSH level was checked today.      COUNSELING:   Reviewed preventive health counseling, as reflected in patient instructions  Regular exercise       Healthy diet/nutrition and exercise       Immunizations: decline influenza vaccine     reports that he has never smoked. He has never used smokeless tobacco.      Estimated body mass index is 33.81 kg/(m^2) as calculated from the following:    Height as of this encounter: 5' 6\" (1.676 m).    Weight as of this encounter: 209 lb 8 oz (95 kg).   Weight management plan: Discussed healthy diet and exercise guidelines and patient will follow up in 12 months in clinic to re-evaluate.    Counseling Resources:  ATP IV Guidelines  Pooled Cohorts Equation Calculator  FRAX " Risk Assessment  ICSI Preventive Guidelines  Dietary Guidelines for Americans, 2010  USDA's MyPlate  ASA Prophylaxis  Lung CA Screening    I, Carleen Serrano am acting as scribe for Dr. David Ackerman Mai, MD.      David Ackerman Mai, MD  [facility]

## 2017-10-11 NOTE — MR AVS SNAPSHOT
After Visit Summary   10/11/2017    Da Akins    MRN: [id number]           Patient Information     Date Of Birth          1965        Visit Information        Provider Department      10/11/2017 8:00 AM David Romero MD [facility]        Today's Diagnoses     Encounter for routine adult health examination without abnormal findings    -  1    Non morbid obesity due to excess calories        Hip pain, right        Benign essential hypertension          Care Instructions      Preventive Health Recommendations  Male Ages 50 - 64    Yearly exam:             See your health care provider every year in order to  o   Review health changes.   o   Discuss preventive care.    o   Review your medicines if your doctor has prescribed any.     Have a cholesterol test every 5 years, or more frequently if you are at risk for high cholesterol/heart disease.     Have a diabetes test (fasting glucose) every three years. If you are at risk for diabetes, you should have this test more often.     Have a colonoscopy at age 50, or have a yearly FIT test (stool test). These exams will check for colon cancer.      Talk with your health care provider about whether or not a prostate cancer screening test (PSA) is right for you.    You should be tested each year for STDs (sexually transmitted diseases), if you re at risk.     Shots: Get a flu shot each year. Get a tetanus shot every 10 years.     Nutrition:    Eat at least 5 servings of fruits and vegetables daily.     Eat whole-grain bread, whole-wheat pasta and brown rice instead of white grains and rice.     Talk to your provider about Calcium and Vitamin D.     Lifestyle    Exercise for at least 150 minutes a week (30 minutes a day, 5 days a week). This will help you control your weight and prevent disease.     Limit alcohol to one drink per day.     No smoking.     Wear sunscreen to prevent skin cancer.     See your dentist every six months for an exam  and cleaning.     See your eye doctor every 1 to 2 years.            Follow-ups after your visit        Your next 10 appointments already scheduled     Oct 18, 2017  8:00 AM CDT   Ortho Treatment with Karen Concepcion PT   [facility] Physical Therapy ([facility])    [address]   [place] [id number] [phone]            Oct 23, 2017   Procedure with Clay Brown MD   [facility] Endoscopy ([facility])    [address] [place] [id number] [phone]            Oct 25, 2017  8:15 AM CDT   [facility]    [address]  [facility] [address] [phone]              Who to contact     If you have questions or need follow up information about today's clinic visit or your schedule please contact [facility] directly at [phone].  Normal or non-critical lab and imaging results will be communicated to you by [employer], letter or phone within 4 business days after the clinic has received the results. If you do not hear from us within 7 days, please contact the clinic through [email or web address] or phone. If you have a critical or abnormal lab result, we will notify you by phone as soon as possible.  Submit refill requests through [employer] or call your pharmacy and they will forward the refill request to us. Please allow 3 business days for your refill to be completed.          Additional Information About Your Visit        [employer] Information     [employer] gives you secure access to your electronic health record. If you see a primary care provider, you can also send messages to your care team and make appointments. If you have questions, please call your primary care clinic.  If you do not have a primary care provider, please call [phone] and they will assist you.        Care EveryWhere ID     This is your Care EveryWhere ID. This could be used by other  "organizations to access your [place] medical records  [id number]        Your Vitals Were     Pulse Temperature Height BMI (Body Mass Index)          60 96.4  F (35.8  C) (Tympanic) 5' 6\" (1.676 m) 33.81 kg/m2         Blood Pressure from Last 3 Encounters:   10/11/17 (!) 156/100   09/27/17 142/78   08/14/17 (!) 146/98    Weight from Last 3 Encounters:   10/11/17 209 lb 8 oz (95 kg)   09/27/17 211 lb 4 oz (95.8 kg)   08/14/17 201 lb (91.2 kg)              We Performed the Following     Comprehensive metabolic panel (BMP + Alb, Alk Phos, ALT, AST, Total. Bili, TP)     Lipid panel reflex to direct LDL     PSA, screen     TSH          Today's Medication Changes          These changes are accurate as of: 10/11/17  9:02 AM.  If you have any questions, ask your nurse or doctor.               Start taking these medicines.        Dose/Directions    diclofenac 75 MG EC tablet   Commonly known as:  VOLTAREN   Used for:  Hip pain, right   Started by:  David Romero MD        Dose:  75 mg   Take 1 tablet (75 mg) by mouth 2 times daily as needed for moderate pain   Quantity:  60 tablet   Refills:  1       hydrochlorothiazide 25 MG tablet   Commonly known as:  HYDRODIURIL   Used for:  Benign essential hypertension   Started by:  David Romero MD        Dose:  25 mg   Take 1 tablet (25 mg) by mouth daily   Quantity:  30 tablet   Refills:  1         Stop taking these medicines if you haven't already. Please contact your care team if you have questions.     naproxen 500 MG tablet   Commonly known as:  NAPROSYN   Stopped by:  David Romero MD                Where to get your medicines      These medications were sent to [place] [address] [place] [facility] - [place] - [address]  [address] [place] [address]     Phone:  [phone]     diclofenac 75 MG EC tablet    hydrochlorothiazide 25 MG tablet                Primary Care Provider Office Phone # Fax #    David Ackerman Mai, [phone]       [address] [place] " [id number]        Equal Access to Services     [facility] Center[facility]GE[facility]A : Hadii aad ku hadaartijude Shwetaali, wakrishnada luqadaha, qaybta kamanuelajohn avendaño, patrick arguelles. So [facility] [phone].    ATENCIÓN: Si habla español, tiene a abbasi disposición servicios gratuitos de asistencia lingüística. Llame al [phone].    We comply with applicable federal civil rights laws and [place] laws. We do not discriminate on the basis of race, color, national origin, age, disability, sex, sexual orientation, or gender identity.            Thank you!     Thank you for choosing [facility]  for your care. Our goal is always to provide you with excellent care. Hearing back from our patients is one way we can continue to improve our services. Please take a few minutes to complete the written survey that you may receive in the mail after your visit with us. Thank you!             Your Updated Medication List - Protect others around you: Learn how to safely use, store and throw away your medicines at www.disposemymeds.org.          This list is accurate as of: 10/11/17  9:02 AM.  Always use your most recent med list.                   Brand Name Dispense Instructions for use Diagnosis    diclofenac 75 MG EC tablet    VOLTAREN    60 tablet    Take 1 tablet (75 mg) by mouth 2 times daily as needed for moderate pain    Hip pain, right       hydrochlorothiazide 25 MG tablet    HYDRODIURIL    30 tablet    Take 1 tablet (25 mg) by mouth daily    Benign essential hypertension       ketoconazole 2 % cream    NIZORAL    15 g    Apply topically 2 times daily    Tinea cruris

## 2017-10-18 ENCOUNTER — HOSPITAL ENCOUNTER (OUTPATIENT)
Dept: PHYSICAL THERAPY | Facility: CLINIC | Age: 52
Setting detail: THERAPIES SERIES
End: 2017-10-18
Attending: FAMILY MEDICINE
Payer: COMMERCIAL

## 2017-10-18 ENCOUNTER — TELEPHONE (OUTPATIENT)
Dept: FAMILY MEDICINE | Facility: CLINIC | Age: 52
End: 2017-10-18

## 2017-10-18 DIAGNOSIS — M25.551 HIP PAIN, RIGHT: Primary | ICD-10-CM

## 2017-10-18 PROCEDURE — 40000718 ZZHC STATISTIC PT DEPARTMENT ORTHO VISIT: Performed by: PHYSICAL THERAPIST

## 2017-10-18 PROCEDURE — 97110 THERAPEUTIC EXERCISES: CPT | Mod: GP | Performed by: PHYSICAL THERAPIST

## 2017-10-18 PROCEDURE — 97140 MANUAL THERAPY 1/> REGIONS: CPT | Mod: GP | Performed by: PHYSICAL THERAPIST

## 2017-10-18 NOTE — TELEPHONE ENCOUNTER
Patient informed of message below and states the best day for the Right Hip injection would be 10/30/17 in the early am and second option would be early am that week.  He acknowledges the understanding of the plan below and states he has never had a cortisone injection in the past.  TC to help assist patient with scheduling of Cortisone injection SHILA Matias/Nicky Gray CMA ([facility])

## 2017-10-18 NOTE — PROGRESS NOTES
Outpatient Physical Therapy Discharge Note     Patient: Da Akins  : 1965    Beginning/End Dates of Reporting Period:  17 to 10/18/2017    Referring Provider: Dr. Romero    Therapy Diagnosis: R hip pain and decreased ROM  and decreased strength consistent with R hip OA     Client Self Report: Pt reports feels like new medication is helping pain, reports no pain at night. But then past 2 nights have been sore again, still was taking medication. No pain during the day last week, up until yesterday had some pain. On average during the day R hip 0/10 but at worst 3/10 walking prolonged distances. Pt has plan to return to doctor for cortizone injection. Pt reports feels comfortable with HEP and would like to d/c and continue HEP at home and try injection for pain management.    Objective Measurements:  Objective Measure: R hip ROM  Details: flexion 130 (improved from 116), 23 ABD (improved from 15 from eval), ER 32 (improved from 26), IR 34 (improved from 20 at eval)    Objective Measure: MMT  Details: Flexion 5/5 (improved from 3-/5 at eval) painfree,     Objective Measure: Special Tests  Details: (+) ALEX, (+) FADIR, (+) scour pain 2/10 in groin         Outcome Measures (most recent score):  Lower Extremity Functional Scale (LEFS) assesses the patients level of difficulty with various activities. The higher the score, the greater the level of function a patient demonstrates. Pt scored 61 points out of 80 possible indicating patient is at 76% of maximal function. MCID is 9 points. LEFS is validated for patients 18 years and older, and if completed by a younger patient, is done to help determine functional deficits and activity limitations for goal use.  Pt's score decreased from 75/80 at evaluation. Pt reported at eval no difficulty with many high level activities that he stated today are difficult to complete.          Goals:  Goal Identifier Sleeping   Goal Description Pt able to report able to sleep  through the night in his usual way with out being wakened by hip pain x 2 weeks.  (1x this past week)   Target Date 11/04/17   Date Met  10/18/17   Progress:     Goal Identifier Getting into/out of a car   Goal Description Pt able to get into and out of both is truck and his personal vehicle with out hip pain (pt reports this is now back to normal, usually 0/10 pain)   Target Date 11/04/17   Date Met  10/18/17   Progress:     Goal Identifier Walking   Goal Description Pt able to walk x 1 mile with no increase in hip pain during or after his walk (most of the time 0-1/10 R hip pain, at worst 3-4/10)   Target Date 11/04/17   Date Met  10/18/17 (with occasional increase in pain)   Progress:     Progress Toward Goals:   Progress this reporting period: Pt has made significant gains in ROM, strength of R hip. Waking up at night due to pain has been variable, with new medication pt has felt this has improved. Pain with walking is variable with most of the time being 0/10 and at worst 3/10. Pt reports plans to follow up with Dr. Romero for possible injection of R hip for pain management. Pt feels independent with HEP.        Plan:  Discharge from therapy.    Discharge:    Reason for Discharge: Patient has met all goals.    Equipment Issued: therapy band    Discharge Plan: Patient to continue home program. Pt to follow up with doctor as needed.

## 2017-10-18 NOTE — TELEPHONE ENCOUNTER
It would be appropriate to stop diclofenac/NSAID because it may be causing high blood pressure. Pain and disrupted sleep may also be causing issues. Acetaminophen is an appropriate medication which will not raise blood pressure. We also should arrange a cortisone type hip injection through radiology. For now he should stay on hydrochlorothiazide in case he has true underlying hypertension. If he becomes lightheaded so he cannot stand and function, then he needs to contact us for instructions regarding hydrochlorothiazide. José Miguel Matias M.D.

## 2017-10-18 NOTE — TELEPHONE ENCOUNTER
Reason for Call:  Other  Pt states he started new BP med and Pain med last week Thursday 11/12 and states his BP is still elevated - This morning 175/100, Just now 166/97    Detailed comments: Pt states the side effects of his new pain med states can cause HBP.  Please call and advise.  Pt knows Dr Romero is out.  Can someone else address this?  Also, pt states he is done with his Physical Therapy and is wondering when he can get a cortisone shot.    Phone Number Patient can be reached at: [phone] (H)  Or you can try cell number also [phone]    Best Time: any    Can we leave a detailed message on this number? YES    Call taken on 10/18/2017 at 10:32 AM by Ananya Méndez

## 2017-10-19 NOTE — TELEPHONE ENCOUNTER
Patient notified of injection being scheduled on 10/30 at 8:00 am with an arrival at 7:45 am. Instructed on nothing to drink one hour before and that he will need a . Patient then ststing not sure if this date will work or not. Number given to call if he would need to reschedule. Also requested that we send all information via [employer] message. Gema Meraz LPN

## 2017-10-23 ENCOUNTER — HOSPITAL ENCOUNTER (OUTPATIENT)
Facility: CLINIC | Age: 52
Discharge: HOME | End: 2017-10-23
Attending: INTERNAL MEDICINE | Admitting: INTERNAL MEDICINE
Payer: COMMERCIAL

## 2017-10-23 ENCOUNTER — SURGERY (OUTPATIENT)
Age: 52
End: 2017-10-23

## 2017-10-23 VITALS
DIASTOLIC BLOOD PRESSURE: 87 MMHG | RESPIRATION RATE: 14 BRPM | HEIGHT: 66 IN | HEART RATE: 82 BPM | WEIGHT: 209 LBS | OXYGEN SATURATION: 96 % | BODY MASS INDEX: 33.59 KG/M2 | TEMPERATURE: 98.2 F | SYSTOLIC BLOOD PRESSURE: 126 MMHG

## 2017-10-23 LAB — COLONOSCOPY: NORMAL

## 2017-10-23 PROCEDURE — 88305 TISSUE EXAM BY PATHOLOGIST: CPT | Mod: 26 | Performed by: INTERNAL MEDICINE

## 2017-10-23 PROCEDURE — 45385 COLONOSCOPY W/LESION REMOVAL: CPT | Mod: PT | Performed by: INTERNAL MEDICINE

## 2017-10-23 PROCEDURE — 88305 TISSUE EXAM BY PATHOLOGIST: CPT | Performed by: INTERNAL MEDICINE

## 2017-10-23 PROCEDURE — 25000128 H RX IP 250 OP 636: Performed by: INTERNAL MEDICINE

## 2017-10-23 PROCEDURE — 40000296 ZZH STATISTIC ENDO RECOVERY CLASS 1:2 FIRST HOUR: Performed by: INTERNAL MEDICINE

## 2017-10-23 RX ORDER — FENTANYL CITRATE 50 UG/ML
INJECTION, SOLUTION INTRAMUSCULAR; INTRAVENOUS PRN
Status: DISCONTINUED | OUTPATIENT
Start: 2017-10-23 | End: 2017-10-23 | Stop reason: HOSPADM

## 2017-10-23 RX ORDER — ONDANSETRON 2 MG/ML
4 INJECTION INTRAMUSCULAR; INTRAVENOUS
Status: DISCONTINUED | OUTPATIENT
Start: 2017-10-23 | End: 2017-10-23 | Stop reason: HOSPADM

## 2017-10-23 RX ORDER — LIDOCAINE 40 MG/G
CREAM TOPICAL
Status: DISCONTINUED | OUTPATIENT
Start: 2017-10-23 | End: 2017-10-23 | Stop reason: HOSPADM

## 2017-10-23 RX ADMIN — MIDAZOLAM HYDROCHLORIDE 1 MG: 1 INJECTION, SOLUTION INTRAMUSCULAR; INTRAVENOUS at 11:42

## 2017-10-23 RX ADMIN — MIDAZOLAM HYDROCHLORIDE 1 MG: 1 INJECTION, SOLUTION INTRAMUSCULAR; INTRAVENOUS at 11:37

## 2017-10-23 RX ADMIN — MIDAZOLAM HYDROCHLORIDE 1 MG: 1 INJECTION, SOLUTION INTRAMUSCULAR; INTRAVENOUS at 11:39

## 2017-10-23 RX ADMIN — FENTANYL CITRATE 50 MCG: 50 INJECTION, SOLUTION INTRAMUSCULAR; INTRAVENOUS at 11:37

## 2017-10-23 RX ADMIN — MIDAZOLAM HYDROCHLORIDE 1 MG: 1 INJECTION, SOLUTION INTRAMUSCULAR; INTRAVENOUS at 11:41

## 2017-10-23 RX ADMIN — MIDAZOLAM HYDROCHLORIDE 1 MG: 1 INJECTION, SOLUTION INTRAMUSCULAR; INTRAVENOUS at 11:38

## 2017-10-23 RX ADMIN — FENTANYL CITRATE 25 MCG: 50 INJECTION, SOLUTION INTRAMUSCULAR; INTRAVENOUS at 11:47

## 2017-10-23 NOTE — CONSULTS
[facility] GI Pre-Procedure Physical Assessment    Da Akins MRN# [id number]   Age: 52 year old YOB: 1965      Date of Surgery: 10/23/2017  Location [facility]      Date of Exam 10/23/2017 Facility (Same day)       Primary care provider: David Romero         Active problem list:   Patient Active Problem List   Diagnosis     Non morbid obesity due to excess calories     Benign essential hypertension            Medications (include herbals and vitamins):   Any Plavix use in the last 7 days?  No     No current facility-administered medications for this encounter.              Allergies:      Allergies   Allergen Reactions     No Known Allergies      Seasonal Allergies      Allergy to Latex?  No  Allergy to tape?    No          Social History:     Social History   Substance Use Topics     Smoking status: Never Smoker     Smokeless tobacco: Never Used     Alcohol use No            Physical Exam:   All vitals have been reviewed  There were no vitals taken for this visit.  Airway assessment:   Patient is able to stick out tongue      Lungs:   No increased work of breathing, good air exchange, clear to auscultation bilaterally, no crackles or wheezing      Cardiovascular:   Normal apical impulse, regular rate and rhythm, normal S1 and S2, no S3 or S4, and no murmur noted           Lab / Radiology Results:   All laboratory data reviewed          Assessment:   Appropriately NPO  Chief complaint or anatomic assessment of involved area: Screening         Plan:   Moderate (conscious) sedation     Risks, benefits, alternatives to sedation and blood explained and consent obtained  Risks, benefits, alternatives to procedure explained and consent obtained  Orders and progress notes are in the chart  Discharge from Phase 1 and / or Phase 2 recovery when patient meets criteria    I have reviewed the history and physical, lab finding(s), diagnostic data, medicaitons, and the plan for sedation.   I have determined this patient to be an appropriate candidate for the planned sedation / procedure and have reassessed the patient immediately prior to sedation / procedure.    I have personally and medically directed the administration of medications used.    Clay Brown MD, MD

## 2017-10-24 LAB — COPATH REPORT: NORMAL

## 2017-10-25 ENCOUNTER — OFFICE VISIT (OUTPATIENT)
Dept: UROLOGY | Facility: OTHER | Age: 52
End: 2017-10-25
Payer: COMMERCIAL

## 2017-10-25 VITALS
BODY MASS INDEX: 33.15 KG/M2 | RESPIRATION RATE: 16 BRPM | HEIGHT: 66 IN | WEIGHT: 206.25 LBS | SYSTOLIC BLOOD PRESSURE: 163 MMHG | DIASTOLIC BLOOD PRESSURE: 96 MMHG

## 2017-10-25 DIAGNOSIS — Z30.2 ENCOUNTER FOR VASECTOMY: Primary | ICD-10-CM

## 2017-10-25 DIAGNOSIS — R03.0 ELEVATED BLOOD PRESSURE READING WITHOUT DIAGNOSIS OF HYPERTENSION: ICD-10-CM

## 2017-10-25 PROCEDURE — 88302 TISSUE EXAM BY PATHOLOGIST: CPT | Performed by: UROLOGY

## 2017-10-25 PROCEDURE — 55250 REMOVAL OF SPERM DUCT(S): CPT | Performed by: UROLOGY

## 2017-10-25 PROCEDURE — 99207 ZZC DROP WITH A PROCEDURE: CPT | Mod: 25 | Performed by: UROLOGY

## 2017-10-25 PROCEDURE — 99000 SPECIMEN HANDLING OFFICE-LAB: CPT | Performed by: UROLOGY

## 2017-10-25 NOTE — PROGRESS NOTES
Patient returns to clinic for vasectomy.  After informed consent has been obtained, he was placed supine in the OR table.  He was draped and prepped in usual surgical fashion.  2% lidocaine used for local anesthetics.  A scalpel less technique was used.  A small nick was made in the skin after vas identified/clamped.  Surrounding tissue was  from vas.  A small portion of vas was excised.  Lumen of vas burned.  Vas tied with silk sutures after proximal portion closed.  3.0 chromic suture to close skin opening.  This was again repeated on the other side.  Patient tolerated the procedure well.  Post vas instructions given to patient today.    HTN: Patient to follow up with Primary Care provider regarding elevated blood pressure.

## 2017-10-25 NOTE — NURSING NOTE
"Chief Complaint   Patient presents with     Vasectomy       Initial BP (!) 163/96  Resp 16  Ht 5' 6\" (1.676 m)  Wt 206 lb 4 oz (93.6 kg)  BMI 33.29 kg/m2 Estimated body mass index is 33.29 kg/(m^2) as calculated from the following:    Height as of this encounter: 5' 6\" (1.676 m).    Weight as of this encounter: 206 lb 4 oz (93.6 kg).  Medication Reconciliation: complete     Naya Barth CMA      "

## 2017-10-25 NOTE — MR AVS SNAPSHOT
After Visit Summary   10/25/2017    Da Akins    MRN: [id number]           Patient Information     Date Of Birth          1965        Visit Information        Provider Department      10/25/2017 8:15 AM Praveen Escalante MD [facility]        Today's Diagnoses     Encounter for vasectomy    -  1    Elevated blood pressure reading without diagnosis of hypertension           Follow-ups after your visit        Your next 10 appointments already scheduled     Oct 30, 2017  8:00 AM CDT   XR JOINT INJECTION ASPIRATION MAJOR RT with PHXR1, PH RAD   [facility] ([facility])    [address] [place] [id number] [phone]           Stop drinking 1 hour before the exam.  You may take your medicines as usual, except for blood thinners (Coumadin, Plavix, Ticlid, Persantine, Aggrenox, Pletal, Effient, Brilliant). Talk to your doctor if you take these.  Tell your doctor if:   You have ever had an allergic reaction to X-ray dye (contrast fluid).   There is a chance you may be pregnant.  Please bring a list of your current medicines to your exam. Include vitamins, minerals and over-the-counter medicines.  Please call the Imaging Department at your exam site with any questions.              Future tests that were ordered for you today     Open Future Orders        Priority Expected Expires Ordered    Semen Analysis Post Vasectomy Routine  10/25/2018 10/25/2017            Who to contact     If you have questions or need follow up information about today's clinic visit or your schedule please contact [facility] directly at [phone].  Normal or non-critical lab and imaging results will be communicated to you by MyChart, letter or phone within 4 business days after the clinic has received the results. If you do not hear from us within 7 days, please contact the clinic through MyChart or phone. If you have a critical or abnormal lab  "result, we will notify you by phone as soon as possible.  Submit refill requests through [employer] or call your pharmacy and they will forward the refill request to us. Please allow 3 business days for your refill to be completed.          Additional Information About Your Visit        Kuhart Information     [employer] gives you secure access to your electronic health record. If you see a primary care provider, you can also send messages to your care team and make appointments. If you have questions, please call your primary care clinic.  If you do not have a primary care provider, please call [phone] and they will assist you.        Care EveryWhere ID     This is your Care EveryWhere ID. This could be used by other organizations to access your [place] medical records  [id number]        Your Vitals Were     Respirations Height BMI (Body Mass Index)             16 5' 6\" (1.676 m) 33.29 kg/m2          Blood Pressure from Last 3 Encounters:   10/25/17 (!) 163/96   10/23/17 126/87   10/11/17 (!) 156/100    Weight from Last 3 Encounters:   10/25/17 206 lb 4 oz (93.6 kg)   10/23/17 209 lb (94.8 kg)   10/11/17 209 lb 8 oz (95 kg)              We Performed the Following     CL SPECIMEN HANDLING,DR OFF->LAB     Surgical pathology exam     VASECTOMY UNILAT/BILAT W POSTOP SEMEN        Primary Care Provider Office Phone # Fax #    Giovannialeksander Ackerman Mai, [phone]       [address] [place] [address]        Equal Access to Services     [facility]: Hadii aad ku hadasho Soomaali, waaxda luqadaha, qaybta kaalmada adeegyada, patrick calixtoin baylee mejia . So [facility] [phone].    ATENCIÓN: Si habla español, tiene a abbasi disposición servicios gratuitos de asistencia lingüística. Llame al [phone].    We comply with applicable federal civil rights laws and [place] laws. We do not discriminate on the basis of race, color, national origin, age, disability, sex, sexual orientation, or gender " identity.            Thank you!     Thank you for choosing [facility]  for your care. Our goal is always to provide you with excellent care. Hearing back from our patients is one way we can continue to improve our services. Please take a few minutes to complete the written survey that you may receive in the mail after your visit with us. Thank you!             Your Updated Medication List - Protect others around you: Learn how to safely use, store and throw away your medicines at www.disposemymeds.org.          This list is accurate as of: 10/25/17  8:47 AM.  Always use your most recent med list.                   Brand Name Dispense Instructions for use Diagnosis    hydrochlorothiazide 25 MG tablet    HYDRODIURIL    30 tablet    Take 1 tablet (25 mg) by mouth daily    Benign essential hypertension       ketoconazole 2 % cream    NIZORAL    15 g    Apply topically 2 times daily    Tinea cruris

## 2017-10-27 LAB — COPATH REPORT: NORMAL

## 2017-10-30 ENCOUNTER — HOSPITAL ENCOUNTER (OUTPATIENT)
Dept: GENERAL RADIOLOGY | Facility: CLINIC | Age: 52
Discharge: HOME | End: 2017-10-30
Attending: FAMILY MEDICINE | Admitting: FAMILY MEDICINE
Payer: COMMERCIAL

## 2017-10-30 DIAGNOSIS — M25.551 HIP PAIN, RIGHT: ICD-10-CM

## 2017-10-30 PROCEDURE — 20610 DRAIN/INJ JOINT/BURSA W/O US: CPT | Mod: RT

## 2017-10-30 PROCEDURE — S0020 INJECTION, BUPIVICAINE HYDRO: HCPCS | Performed by: RADIOLOGY

## 2017-10-30 PROCEDURE — 25500064 ZZH RX 255 OP 636: Performed by: RADIOLOGY

## 2017-10-30 PROCEDURE — 25000125 ZZHC RX 250: Performed by: RADIOLOGY

## 2017-10-30 PROCEDURE — 25000128 H RX IP 250 OP 636: Performed by: RADIOLOGY

## 2017-10-30 RX ORDER — LIDOCAINE HYDROCHLORIDE 10 MG/ML
5 INJECTION, SOLUTION INFILTRATION; PERINEURAL ONCE
Status: COMPLETED | OUTPATIENT
Start: 2017-10-30 | End: 2017-10-30

## 2017-10-30 RX ORDER — IOPAMIDOL 408 MG/ML
50 INJECTION, SOLUTION INTRAVASCULAR ONCE
Status: COMPLETED | OUTPATIENT
Start: 2017-10-30 | End: 2017-10-30

## 2017-10-30 RX ORDER — BUPIVACAINE HYDROCHLORIDE 2.5 MG/ML
10 INJECTION, SOLUTION EPIDURAL; INFILTRATION; INTRACAUDAL ONCE
Status: COMPLETED | OUTPATIENT
Start: 2017-10-30 | End: 2017-10-30

## 2017-10-30 RX ORDER — TRIAMCINOLONE ACETONIDE 40 MG/ML
40 INJECTION, SUSPENSION INTRA-ARTICULAR; INTRAMUSCULAR ONCE
Status: COMPLETED | OUTPATIENT
Start: 2017-10-30 | End: 2017-10-30

## 2017-10-30 RX ADMIN — BUPIVACAINE HYDROCHLORIDE 4 ML: 2.5 INJECTION, SOLUTION EPIDURAL; INFILTRATION; INTRACAUDAL at 08:49

## 2017-10-30 RX ADMIN — IOPAMIDOL 4 ML: 408 INJECTION, SOLUTION INTRAVASCULAR at 08:34

## 2017-10-30 RX ADMIN — LIDOCAINE HYDROCHLORIDE 5 ML: 10 INJECTION, SOLUTION INFILTRATION; PERINEURAL at 08:28

## 2017-10-30 RX ADMIN — TRIAMCINOLONE ACETONIDE 40 MG: 40 INJECTION, SUSPENSION INTRA-ARTICULAR; INTRAMUSCULAR at 08:49

## 2018-01-03 ENCOUNTER — TELEPHONE (OUTPATIENT)
Dept: FAMILY MEDICINE | Facility: CLINIC | Age: 53
End: 2018-01-03

## 2018-01-03 NOTE — TELEPHONE ENCOUNTER
Left message for patient call back. Patient is due for BP FOLLOW UP. Please assist patient in scheduling. If patient declines or has had elsewhere please note and route back to care team.        Merline Quiles CMA    Panel Management Review      Patient has the following on his problem list:     Hypertension   Last three blood pressure readings:  BP Readings from Last 3 Encounters:   10/25/17 (!) 163/96   10/23/17 126/87   10/11/17 (!) 156/100     Blood pressure: FAILED    HTN Guidelines:  Age 18-59 BP range:  Less than 140/90  Age 60-85 with Diabetes:  Less than 140/90  Age 60-85 without Diabetes:  less than 150/90        Composite cancer screening  Chart review shows that this patient is due/due soon for the following None  Summary:    Patient is due/failing the following:   BP CHECK    Action needed:   Patient needs office visit for bp follow up.    Type of outreach:    Phone, left message for patient to call back.     Questions for provider review:    None                                                                                                                                    Emma Quiles MA 1/3/2018       Chart routed to    .

## 2018-01-31 NOTE — TELEPHONE ENCOUNTER
Phone, left message for patient to call back.  will close encounter due to no patient call back.  Emma Quiles MA 1/31/2018

## 2018-02-06 NOTE — TELEPHONE ENCOUNTER
Patient returned call & states he will call back to schedule.  Thank you,  Rocio Tatum  Patient Representative

## 2018-03-27 DIAGNOSIS — Z30.2 ENCOUNTER FOR VASECTOMY: Primary | ICD-10-CM

## 2018-03-27 LAB
SPERM MOTILE SMN QL MICRO: NORMAL
SPERM P VAS SMN QL MICRO: NORMAL

## 2018-03-27 PROCEDURE — 89321 SEMEN ANAL SPERM DETECTION: CPT | Performed by: UROLOGY

## 2018-05-15 ENCOUNTER — OFFICE VISIT (OUTPATIENT)
Dept: FAMILY MEDICINE | Facility: OTHER | Age: 53
End: 2018-05-15
Payer: COMMERCIAL

## 2018-05-15 ENCOUNTER — RADIANT APPOINTMENT (OUTPATIENT)
Dept: GENERAL RADIOLOGY | Facility: OTHER | Age: 53
End: 2018-05-15
Attending: PHYSICIAN ASSISTANT
Payer: COMMERCIAL

## 2018-05-15 VITALS
RESPIRATION RATE: 18 BRPM | WEIGHT: 209.7 LBS | BODY MASS INDEX: 33.7 KG/M2 | TEMPERATURE: 98.1 F | OXYGEN SATURATION: 98 % | HEART RATE: 76 BPM | DIASTOLIC BLOOD PRESSURE: 78 MMHG | SYSTOLIC BLOOD PRESSURE: 138 MMHG | HEIGHT: 66 IN

## 2018-05-15 DIAGNOSIS — S49.92XA SHOULDER INJURY, LEFT, INITIAL ENCOUNTER: Primary | ICD-10-CM

## 2018-05-15 DIAGNOSIS — S49.92XA SHOULDER INJURY, LEFT, INITIAL ENCOUNTER: ICD-10-CM

## 2018-05-15 PROCEDURE — 99214 OFFICE O/P EST MOD 30 MIN: CPT | Performed by: PHYSICIAN ASSISTANT

## 2018-05-15 PROCEDURE — 73030 X-RAY EXAM OF SHOULDER: CPT | Mod: LT

## 2018-05-15 RX ORDER — NAPROXEN 500 MG/1
500 TABLET ORAL 2 TIMES DAILY PRN
Qty: 30 TABLET | Refills: 1 | Status: SHIPPED | OUTPATIENT
Start: 2018-05-15 | End: 2018-10-23

## 2018-05-15 RX ORDER — HYDROCODONE BITARTRATE AND ACETAMINOPHEN 5; 325 MG/1; MG/1
1 TABLET ORAL EVERY 4 HOURS PRN
Qty: 10 TABLET | Refills: 0 | Status: SHIPPED | OUTPATIENT
Start: 2018-05-15 | End: 2018-10-23

## 2018-05-15 ASSESSMENT — PAIN SCALES - GENERAL: PAINLEVEL: WORST PAIN (10)

## 2018-05-15 NOTE — MR AVS SNAPSHOT
After Visit Summary   5/15/2018    Da Akins    MRN: [id number]           Patient Information     Date Of Birth          1965        Visit Information        Provider Department      5/15/2018 11:00 AM Brook Urbano PA-C [facility]        Today's Diagnoses     Shoulder injury, left, initial encounter    -  1      Care Instructions    I do not see an obvious fracture on xray but we will contact you with official radiology read. I will  Have you rest the arm by using a sling and will treat for inflammation with Naproxen 500 mg 2x daily with food and also prescribe a pain medication for acute pain. Follow up with sports medicine if symptoms are persisting or worsening.   Understanding AC Joint Sprain    The AC (acromioclavicular) joint is where the shoulder blade (scapula) meets the collarbone (clavicle). The highest point of the shoulder blade is called the acromion. Strong tissues called ligaments connect the acromion to the collarbone, forming the AC joint.  An AC joint sprain occurs when an injury damages the ligaments in the AC joint.  What causes AC joint sprain?  Often an accident or injury forces the AC joint apart. This may include:    Falling onto your shoulder    Falling onto an outstretched hand    Getting a direct blow to your shoulder  Symptoms of AC joint sprain  Symptoms can vary depending on how serious the injury is. They can include:    Shoulder pain    Shoulder that feels sore when touched    Swelling    Bruising    Change in the shoulder s shape    Bulge above the shoulder    Shoulder that appears to droop    Collarbone that moves upward    Limited movement in the shoulder  Treatment for AC joint sprain  Treatment will depend on how serious the strain is. It will also depend on whether you have damage to other parts of the shoulder. Treatment may include:    Rest. This allows your shoulder to heal. You should avoid activities that stress the joint. This  includes reaching overhead or sleeping on your shoulder.    Sling. This protects the shoulder and holds the joint in a good position for healing.    Cold packs. These help reduce swelling and relieve pain.    Prescription or over-the-counter pain medicines. These help relieve pain and swelling.    Arm and shoulder exercises. These help keep the shoulder joint mobile as it heals. They also help improve muscle strength around the joint.     When to call your healthcare provider  Call your healthcare provider right away if you have any of these:    Fever of 100.4 F (38 C) or higher, or as directed    Symptoms that don t get better or get worse    New symptoms   Date Last Reviewed: 3/10/2016    7024-5390 [employer]. [address] [place] [address], [place] [address]. All rights reserved. This information is not intended as a substitute for professional medical care. Always follow your healthcare professional's instructions.                Follow-ups after your visit        Additional Services     ORTHO  REFERRAL       [facility] is referring you to the Orthopedic  Services at [facility].       The  Representative will assist you in the coordination of your Orthopedic and Musculoskeletal Care as prescribed by your physician.    The  Representative will call you within 1 business day to help schedule your appointment, or you may contact the  Representative at:    All areas ~ [phone]     Type of Referral : Non Surgical       Timeframe requested: 1 - 2 days    Coverage of these services is subject to the terms and limitations of your health insurance plan.  Please call member services at your health plan with any benefit or coverage questions.      If X-rays, CT or MRI's have been performed, please contact the facility where they were done to arrange for , prior to your scheduled appointment.  Please bring this referral  "request to your appointment and present it to your specialist.                  Follow-up notes from your care team     Return if symptoms worsen or fail to improve.      Who to contact     If you have questions or need follow up information about today's clinic visit or your schedule please contact [facility] directly at [phone].  Normal or non-critical lab and imaging results will be communicated to you by MyChart, letter or phone within 4 business days after the clinic has received the results. If you do not hear from us within 7 days, please contact the clinic through [employer] or phone. If you have a critical or abnormal lab result, we will notify you by phone as soon as possible.  Submit refill requests through [employer] or call your pharmacy and they will forward the refill request to us. Please allow 3 business days for your refill to be completed.          Additional Information About Your Visit        MyChart Information     [employer] gives you secure access to your electronic health record. If you see a primary care provider, you can also send messages to your care team and make appointments. If you have questions, please call your primary care clinic.  If you do not have a primary care provider, please call [phone] and they will assist you.        Care EveryWhere ID     This is your Care EveryWhere ID. This could be used by other organizations to access your [place] medical records  [id number]        Your Vitals Were     Pulse Temperature Respirations Height Pulse Oximetry BMI (Body Mass Index)    76 98.1  F (36.7  C) (Tympanic) 18 5' 6.34\" (1.685 m) 98% 33.5 kg/m2       Blood Pressure from Last 3 Encounters:   05/15/18 138/78   10/25/17 (!) 163/96   10/23/17 126/87    Weight from Last 3 Encounters:   05/15/18 209 lb 11.2 oz (95.1 kg)   10/25/17 206 lb 4 oz (93.6 kg)   10/23/17 209 lb (94.8 kg)              We Performed the Following     ORTHO  REFERRAL          Today's " Medication Changes          These changes are accurate as of 5/15/18 11:59 PM.  If you have any questions, ask your nurse or doctor.               Start taking these medicines.        Dose/Directions    HYDROcodone-acetaminophen 5-325 MG per tablet   Commonly known as:  NORCO   Used for:  Shoulder injury, left, initial encounter   Started by:  Brook Urbano PA-C        Dose:  1 tablet   Take 1 tablet by mouth every 4 hours as needed for severe pain   Quantity:  10 tablet   Refills:  0       naproxen 500 MG tablet   Commonly known as:  NAPROSYN   Used for:  Shoulder injury, left, initial encounter   Started by:  Brook Urbano PA-C        Dose:  500 mg   Take 1 tablet (500 mg) by mouth 2 times daily as needed for moderate pain   Quantity:  30 tablet   Refills:  1            Where to get your medicines      These medications were sent [address] [facility] [address]     Phone:  [phone]     naproxen 500 MG tablet         Some of these will need a paper prescription and others can be bought over the counter.  Ask your nurse if you have questions.     Bring a paper prescription for each of these medications     HYDROcodone-acetaminophen 5-325 MG per tablet               Information about OPIOIDS     PRESCRIPTION OPIOIDS: WHAT YOU NEED TO KNOW   You have a prescription for an opioid (narcotic) pain medicine. Opioids can cause addiction. If you have a history of chemical dependency of any type, you are at a higher risk of becoming addicted to opioids. Only take this medicine after all other options have been tried. Take it for as short a time and as few doses as possible.     Do not:    Drive. If you drive while taking these medicines, you could be arrested for driving under the influence (DUI).    Operate heavy machinery    Do any other dangerous activities while taking these medicines.     Drink any alcohol while taking these medicines.      Take with  any other medicines that contain acetaminophen. Read all labels carefully. Look for the word  acetaminophen  or  Tylenol.  Ask your pharmacist if you have questions or are unsure.    Store your pills in a secure place, locked if possible. We will not replace any lost or stolen medicine. If you don t finish your medicine, please throw away (dispose) as directed by your pharmacist. The [place] Pollution Control Agency has more information about safe disposal: https://www.pca.Counts include 234 beds at the [facility].mn.[place]/living-green/managing-unwanted-medications    All opioids tend to cause constipation. Drink plenty of water and eat foods that have a lot of fiber, such as fruits, vegetables, prune juice, apple juice and high-fiber cereal. Take a laxative (Miralax, milk of magnesia, Colace, Senna) if you don t move your bowels at least every other day.          Primary Care Provider Office Phone # Fax #    David Ackerman Mai, [phone] [address] [place] [address]        Equal Access to Services     BABAR CORDOBA : Hadii aad ku hadasho Soomaali, waaxda luqadaha, qaybta kaalmada adeegyada, waxay marilyin hayzulema mejia . So [facility] [phone].    ATENCIÓN: Si habla español, tiene a abbasi disposición servicios gratuitos de asistencia lingüística. Ll[facility] [phone].    We comply with applicable federal civil rights laws and [place] laws. We do not discriminate on the basis of race, color, national origin, age, disability, sex, sexual orientation, or gender identity.            Thank you!     Thank you for choosing [facility]  for your care. Our goal is always to provide you with excellent care. Hearing back from our patients is one way we can continue to improve our services. Please take a few minutes to complete the written survey that you may receive in the mail after your visit with us. Thank you!             Your Updated Medication List - Protect others around you: Learn how to safely use, store  and throw away your medicines at www.disposemymeds.org.          This list is accurate as of 5/15/18 11:59 PM.  Always use your most recent med list.                   Brand Name Dispense Instructions for use Diagnosis    hydrochlorothiazide 25 MG tablet    HYDRODIURIL    30 tablet    Take 1 tablet (25 mg) by mouth daily    Benign essential hypertension       HYDROcodone-acetaminophen 5-325 MG per tablet    NORCO    10 tablet    Take 1 tablet by mouth every 4 hours as needed for severe pain    Shoulder injury, left, initial encounter       ketoconazole 2 % cream    NIZORAL    15 g    Apply topically 2 times daily    Tinea cruris       naproxen 500 MG tablet    NAPROSYN    30 tablet    Take 1 tablet (500 mg) by mouth 2 times daily as needed for moderate pain    Shoulder injury, left, initial encounter

## 2018-05-15 NOTE — PATIENT INSTRUCTIONS
I do not see an obvious fracture on xray but we will contact you with official radiology read. I will  Have you rest the arm by using a sling and will treat for inflammation with Naproxen 500 mg 2x daily with food and also prescribe a pain medication for acute pain. Follow up with sports medicine if symptoms are persisting or worsening.   Understanding AC Joint Sprain    The AC (acromioclavicular) joint is where the shoulder blade (scapula) meets the collarbone (clavicle). The highest point of the shoulder blade is called the acromion. Strong tissues called ligaments connect the acromion to the collarbone, forming the AC joint.  An AC joint sprain occurs when an injury damages the ligaments in the AC joint.  What causes AC joint sprain?  Often an accident or injury forces the AC joint apart. This may include:    Falling onto your shoulder    Falling onto an outstretched hand    Getting a direct blow to your shoulder  Symptoms of AC joint sprain  Symptoms can vary depending on how serious the injury is. They can include:    Shoulder pain    Shoulder that feels sore when touched    Swelling    Bruising    Change in the shoulder s shape    Bulge above the shoulder    Shoulder that appears to droop    Collarbone that moves upward    Limited movement in the shoulder  Treatment for AC joint sprain  Treatment will depend on how serious the strain is. It will also depend on whether you have damage to other parts of the shoulder. Treatment may include:    Rest. This allows your shoulder to heal. You should avoid activities that stress the joint. This includes reaching overhead or sleeping on your shoulder.    Sling. This protects the shoulder and holds the joint in a good position for healing.    Cold packs. These help reduce swelling and relieve pain.    Prescription or over-the-counter pain medicines. These help relieve pain and swelling.    Arm and shoulder exercises. These help keep the shoulder joint mobile as it heals.  They also help improve muscle strength around the joint.     When to call your healthcare provider  Call your healthcare provider right away if you have any of these:    Fever of 100.4 F (38 C) or higher, or as directed    Symptoms that don t get better or get worse    New symptoms   Date Last Reviewed: 3/10/2016    4131-0405 [employer]. [address] [place] [address], [place] [address]. All rights reserved. This information is not intended as a substitute for professional medical care. Always follow your healthcare professional's instructions.

## 2018-05-15 NOTE — PROGRESS NOTES
"  SUBJECTIVE:   Da Akins is a 53 year old male who presents to clinic today for the following health issues:      Joint Pain- left shoulder    Onset: 5/14/18    Description:   Location: left shoulder  Character: sharp if try to raise arm    Intensity: severe if try to lift unable to go above shoulder with hand    Progression of Symptoms: worse    Accompanying Signs & Symptoms:  Other symptoms: radiation of pain to arm, weakness of shoulder and swelling    History:   Previous similar pain: no       Precipitating factors:   Trauma or overuse: YES- fell out of truck due to running boards being slippery     Alleviating factors:  Improved by: nothing    Therapies Tried and outcome: ibuprofen     Patient is here in clinic due to a left shoulder injury. He reports that he slipped on the wet running boards on his truck and fell landing on his bent elbow. He has some scratches on the elbow but no elbow pain or decreased ROM of the elbow. He has pain and swelling to the anterior shoulder as well as pain with movement. He is unable to lift the shoulder away from the body due to pain.     -------------------------------------    Problem list and histories reviewed & adjusted, as indicated.  Additional history: as documented    BP Readings from Last 3 Encounters:   05/15/18 138/78   10/25/17 (!) 163/96   10/23/17 126/87    Wt Readings from Last 3 Encounters:   05/15/18 209 lb 11.2 oz (95.1 kg)   10/25/17 206 lb 4 oz (93.6 kg)   10/23/17 209 lb (94.8 kg)        Reviewed and updated as needed this visit by clinical staff  Tobacco  Allergies  Meds  Med Hx  Surg Hx  Fam Hx  Soc Hx      Reviewed and updated as needed this visit by Provider       ROS:  Constitutional, HEENT, cardiovascular, pulmonary, gi and gu systems are negative, except as otherwise noted.    OBJECTIVE:     /78  Pulse 76  Temp 98.1  F (36.7  C) (Tympanic)  Resp 18  Ht 5' 6.34\" (1.685 m)  Wt 209 lb 11.2 oz (95.1 kg)  SpO2 98%  BMI 33.5 " kg/m2  Body mass index is 33.5 kg/(m^2).  GENERAL: healthy, alert and no distress  RESP: lungs clear to auscultation - no rales, rhonchi or wheezes  CV: regular rate and rhythm, normal S1 S2, no S3 or S4, no murmur, click or rub, no peripheral edema and peripheral pulses strong  MS: decreased ROM of the left shoulder, no tenderness to palpation of the clavicle, there is tenderness to the AC joint and anterior shoulder. Elbow has full ROm with no tenderness on exam.  SKIN: no suspicious lesions or rashes and abrasions to the elbow and forearm, no evidence of infection.     Diagnostic Test Results:  Xray-pending     ASSESSMENT/PLAN:       ICD-10-CM    1. Shoulder injury, left, initial encounter S49.92XA XR Shoulder Left G/E 3 Views     HYDROcodone-acetaminophen (NORCO) 5-325 MG per tablet     naproxen (NAPROSYN) 500 MG tablet     ORTHO  REFERRAL       I will have patient use a sling and an antiinflammatory medication and will follow up as indicated with xray result. If worsening or not having improvement I would have him see sports medicine for further evaluation.   See Patient Instructions    Brook Urbano PA-C  [facility]

## 2018-10-23 ENCOUNTER — OFFICE VISIT (OUTPATIENT)
Dept: FAMILY MEDICINE | Facility: CLINIC | Age: 53
End: 2018-10-23
Payer: COMMERCIAL

## 2018-10-23 VITALS
WEIGHT: 211.2 LBS | SYSTOLIC BLOOD PRESSURE: 144 MMHG | RESPIRATION RATE: 16 BRPM | BODY MASS INDEX: 33.94 KG/M2 | TEMPERATURE: 97.3 F | DIASTOLIC BLOOD PRESSURE: 86 MMHG | OXYGEN SATURATION: 98 % | HEIGHT: 66 IN | HEART RATE: 76 BPM

## 2018-10-23 DIAGNOSIS — R73.01 ELEVATED FASTING BLOOD SUGAR: Primary | ICD-10-CM

## 2018-10-23 DIAGNOSIS — Z83.3 FAMILY HISTORY OF DIABETES MELLITUS: ICD-10-CM

## 2018-10-23 DIAGNOSIS — E66.9 OBESITY (BMI 30-39.9): ICD-10-CM

## 2018-10-23 DIAGNOSIS — R03.0 ELEVATED BP WITHOUT DIAGNOSIS OF HYPERTENSION: ICD-10-CM

## 2018-10-23 DIAGNOSIS — Z00.00 ENCOUNTER FOR ROUTINE ADULT HEALTH EXAMINATION WITHOUT ABNORMAL FINDINGS: Primary | ICD-10-CM

## 2018-10-23 PROBLEM — I10 BENIGN ESSENTIAL HYPERTENSION: Status: RESOLVED | Noted: 2017-10-11 | Resolved: 2018-10-23

## 2018-10-23 LAB
CHOLEST SERPL-MCNC: 220 MG/DL
GLUCOSE SERPL-MCNC: 149 MG/DL (ref 70–99)
HDLC SERPL-MCNC: 37 MG/DL
LDLC SERPL CALC-MCNC: ABNORMAL MG/DL
LDLC SERPL DIRECT ASSAY-MCNC: 114 MG/DL
NONHDLC SERPL-MCNC: 183 MG/DL
TRIGL SERPL-MCNC: 554 MG/DL

## 2018-10-23 PROCEDURE — 36415 COLL VENOUS BLD VENIPUNCTURE: CPT | Performed by: FAMILY MEDICINE

## 2018-10-23 PROCEDURE — 80061 LIPID PANEL: CPT | Performed by: FAMILY MEDICINE

## 2018-10-23 PROCEDURE — 83721 ASSAY OF BLOOD LIPOPROTEIN: CPT | Mod: 59 | Performed by: FAMILY MEDICINE

## 2018-10-23 PROCEDURE — 99396 PREV VISIT EST AGE 40-64: CPT | Performed by: FAMILY MEDICINE

## 2018-10-23 PROCEDURE — 82947 ASSAY GLUCOSE BLOOD QUANT: CPT | Performed by: FAMILY MEDICINE

## 2018-10-23 ASSESSMENT — ENCOUNTER SYMPTOMS
EYE PAIN: 0
PALPITATIONS: 0
SORE THROAT: 0
HEMATOCHEZIA: 0
JOINT SWELLING: 0
NAUSEA: 0
NERVOUS/ANXIOUS: 0
FEVER: 0
SHORTNESS OF BREATH: 0
HEADACHES: 0
ABDOMINAL PAIN: 0
CONSTIPATION: 0
DYSURIA: 0
PARESTHESIAS: 0
MYALGIAS: 0
WEAKNESS: 0
FREQUENCY: 0
HEMATURIA: 0
DIZZINESS: 0
COUGH: 0
CHILLS: 0
HEARTBURN: 0
DIARRHEA: 0

## 2018-10-23 ASSESSMENT — PAIN SCALES - GENERAL: PAINLEVEL: NO PAIN (0)

## 2018-10-23 NOTE — PROGRESS NOTES
Da,  Your results show that your blood sugar was fairly elevated and a follow-up test called hemoglobin A1c that we will check you for diabetes will need to be ordered.  I have placed this order and he will need to make a lab appointment to have this done as it requires a separate blood draw.  It is fine to wait until you come in for your blood pressure recheck in 1-2 weeks.  Please let me know if you have any questions.    Sincerely,  Dr. Benavides

## 2018-10-23 NOTE — PROGRESS NOTES
SUBJECTIVE:   CC: Da Akins is an 53 year old male who presents for preventative health visit.     Physical   Annual:     Getting at least 3 servings of Calcium per day:  NO    Bi-annual eye exam:  NO    Dental care twice a year:  NO    Sleep apnea or symptoms of sleep apnea:  None    Diet:  Regular (no restrictions)    Frequency of exercise:  None    Taking medications regularly:  Yes    Medication side effects:  None    Additional concerns today:  No        Today's PHQ-2 Score:   PHQ-2 ( 1999 Pf[employer]) 10/23/2018   Q1: Little interest or pleasure in doing things 0   Q2: Feeling down, depressed or hopeless 0   PHQ-2 Score 0   Q1: Little interest or pleasure in doing things Not at all   Q2: Feeling down, depressed or hopeless Not at all   PHQ-2 Score 0       Abuse: Current or Past(Physical, Sexual or Emotional)- No  Do you feel safe in your environment - Yes    Social History   Substance Use Topics     Smoking status: Never Smoker     Smokeless tobacco: Never Used     Alcohol use No     Alcohol Use 10/23/2018   If you drink alcohol do you typically have greater than 3 drinks per day OR greater than 7 drinks per week? No   No flowsheet data found.    Last PSA:   PSA   Date Value Ref Range Status   10/11/2017 2.68 0 - 4 ug/L Final     Comment:     Assay Method:  Chemiluminescence using Siemens Vista analyzer       Reviewed orders with patient. Reviewed health maintenance and updated orders accordingly - Yes  Labs reviewed in EPIC    Reviewed and updated as needed this visit by clinical staff  Tobacco  Allergies  Meds  Med Hx  Surg Hx  Fam Hx  Soc Hx        Reviewed and updated as needed this visit by Provider  Med Hx  Surg Hx  Fam Hx            Review of Systems   Constitutional: Negative for chills and fever.   HENT: Negative for congestion, ear pain and sore throat.    Eyes: Negative for pain and visual disturbance.   Respiratory: Negative for cough and shortness of breath.    Cardiovascular: Negative for  "chest pain, palpitations and peripheral edema.   Gastrointestinal: Negative for abdominal pain, constipation, diarrhea, heartburn, hematochezia and nausea.   Genitourinary: Negative for discharge, dysuria, frequency, genital sores, hematuria, impotence and urgency.   Musculoskeletal: Negative for joint swelling and myalgias.   Skin: Negative for rash.   Neurological: Negative for dizziness, weakness, headaches and paresthesias.   Psychiatric/Behavioral: Negative for mood changes. The patient is not nervous/anxious.        OBJECTIVE:   /86  Pulse 76  Temp 97.3  F (36.3  C) (Temporal)  Resp 16  Ht 5' 5.67\" (1.668 m)  Wt 211 lb 3.2 oz (95.8 kg)  SpO2 98%  BMI 34.43 kg/m2    Physical Exam   Constitutional: He is oriented to person, place, and time. He appears well-developed and well-nourished. He is active. No distress.   HENT:   Head: Normocephalic and atraumatic.   Right Ear: Hearing, tympanic membrane, external ear and ear canal normal.   Left Ear: Hearing, tympanic membrane, external ear and ear canal normal.   Nose: Nose normal.   Mouth/Throat: Uvula is midline, oropharynx is clear and moist and mucous membranes are normal. No oral lesions. No oropharyngeal exudate.   Eyes: Conjunctivae, EOM and lids are normal. Pupils are equal, round, and reactive to light. Right eye exhibits no discharge. Left eye exhibits no discharge. No scleral icterus.   Neck: Normal range of motion. Neck supple. No tracheal deviation present. No thyroid mass and no thyromegaly present.   Cardiovascular: Normal rate, regular rhythm, S1 normal, S2 normal, normal heart sounds and normal pulses.  Exam reveals no S3 and no S4.    No murmur heard.  Pulmonary/Chest: Effort normal and breath sounds normal. No respiratory distress. He has no wheezes. He has no rales.   Abdominal: Soft. Bowel sounds are normal. He exhibits no distension and no mass. There is no hepatosplenomegaly. There is no tenderness. There is no guarding. " "  Musculoskeletal: Normal range of motion. He exhibits no edema or deformity.   Lymphadenopathy:     He has no cervical adenopathy.        Right: No supraclavicular adenopathy present.        Left: No supraclavicular adenopathy present.   Neurological: He is alert and oriented to person, place, and time. He has normal strength and normal reflexes. He exhibits normal muscle tone.   Skin: Skin is warm and dry. No lesion and no rash noted.   Psychiatric: He has a normal mood and affect. His speech is normal. Judgment and thought content normal. Cognition and memory are normal.       ASSESSMENT/PLAN:       ICD-10-CM    1. Encounter for routine adult health examination without abnormal findings Z00.00 Glucose     Lipid panel reflex to direct LDL Fasting   2. Family history of diabetes mellitus Z83.3 Glucose     Lipid panel reflex to direct LDL Fasting   3. Obesity (BMI 30-39.9) E66.9 Glucose     Lipid panel reflex to direct LDL Fasting   4. Elevated BP without diagnosis of hypertension R03.0    Patient with family history of diabetes.  Along with his obesity, he is at high risk for diabetes and needs screening today.  We will do fasting blood sugar along with cholesterol check.    COUNSELING:   Reviewed preventive health counseling, as reflected in patient instructions    BP Readings from Last 1 Encounters:   10/23/18 144/86     Estimated body mass index is 34.43 kg/(m^2) as calculated from the following:    Height as of this encounter: 5' 5.67\" (1.668 m).    Weight as of this encounter: 211 lb 3.2 oz (95.8 kg).    BP Screening:   Last 3 BP Readings:    BP Readings from Last 3 Encounters:   10/23/18 144/86   05/15/18 138/78   10/25/17 (!) 163/96       The following was recommended to the patient:  Re-screen within 4 weeks and recommend lifestyle modifications patient has a nurse visit set up in 2 weeks.  If his blood pressure continues to be elevated, he will see me for follow-up after that to discuss hypertension as he " apparently has had a past diagnosis of hypertension.  Weight management plan: Discussed healthy diet and exercise guidelines and patient will follow up in 12 months in clinic to re-evaluate.     reports that he has never smoked. He has never used smokeless tobacco.      Counseling Resources:  ATP IV Guidelines  Pooled Cohorts Equation Calculator  FRAX Risk Assessment  ICSI Preventive Guidelines  Dietary Guidelines for Americans, 2010  USDA's MyPlate  ASA Prophylaxis  Lung CA Screening    Minh Benavides MD  [facility]

## 2018-10-23 NOTE — MR AVS SNAPSHOT
After Visit Summary   10/23/2018    Da Akins    MRN: [id number]           Patient Information     Date Of Birth          1965        Visit Information        Provider Department      10/23/2018 9:30 AM Minh Benavides MD [facility]        Today's Diagnoses     Encounter for routine adult health examination without abnormal findings    -  1    Family history of diabetes mellitus        Obesity (BMI 30-39.9)        Elevated BP without diagnosis of hypertension          Care Instructions      Preventive Health Recommendations  Male Ages 50 - 64    Yearly exam:             See your health care provider every year in order to  o   Review health changes.   o   Discuss preventive care.    o   Review your medicines if your doctor has prescribed any.     Have a cholesterol test every 5 years, or more frequently if you are at risk for high cholesterol/heart disease.     Have a diabetes test (fasting glucose) every three years. If you are at risk for diabetes, you should have this test more often.     Have a colonoscopy at age 50, or have a yearly FIT test (stool test). These exams will check for colon cancer.      Talk with your health care provider about whether or not a prostate cancer screening test (PSA) is right for you.    You should be tested each year for STDs (sexually transmitted diseases), if you re at risk.     Shots: Get a flu shot each year. Get a tetanus shot every 10 years.     Nutrition:    Eat at least 5 servings of fruits and vegetables daily.     Eat whole-grain bread, whole-wheat pasta and brown rice instead of white grains and rice.     Get adequate Calcium and Vitamin D.     Lifestyle    Exercise for at least 150 minutes a week (30 minutes a day, 5 days a week). This will help you control your weight and prevent disease.     Limit alcohol to one drink per day.     No smoking.     Wear sunscreen to prevent skin cancer.     See your dentist every six months for  "an exam and cleaning.     See your eye doctor every 1 to 2 years.            Follow-ups after your visit        Follow-up notes from your care team     Return in about 2 weeks (around 11/6/2018) for Blood pressure recheck.      Who to contact     If you have questions or need follow up information about today's clinic visit or your schedule please contact [facility] directly at [phone].  Normal or non-critical lab and imaging results will be communicated to you by [employer], letter or phone within 4 business days after the clinic has received the results. If you do not hear from us within 7 days, please contact the clinic through Retracet or phone. If you have a critical or abnormal lab result, we will notify you by phone as soon as possible.  Submit refill requests through [email or web address] or call your pharmacy and they will forward the refill request to us. Please allow 3 business days for your refill to be completed.          Additional Information About Your Visit        [facility] Information     [email or web address] gives you secure access to your electronic health record. If you see a primary care provider, you can also send messages to your care team and make appointments. If you have questions, please call your primary care clinic.  If you do not have a primary care provider, please call [phone] and they will assist you.        Care EveryWhere ID     This is your Care EveryWhere ID. This could be used by other organizations to access your [place] medical records  [id number]        Your Vitals Were     Pulse Temperature Respirations Height Pulse Oximetry BMI (Body Mass Index)    76 97.3  F (36.3  C) (Temporal) 16 5' 5.67\" (1.668 m) 98% 34.43 kg/m2       Blood Pressure from Last 3 Encounters:   10/23/18 144/86   05/15/18 138/78   10/25/17 (!) 163/96    Weight from Last 3 Encounters:   10/23/18 211 lb 3.2 oz (95.8 kg)   05/15/18 209 lb 11.2 oz (95.1 kg)   10/25/17 206 lb 4 oz (93.6 kg)              We " Performed the Following     Glucose     Lipid panel reflex to direct LDL Fasting          Today's Medication Changes          These changes are accurate as of 10/23/18 11:20 AM.  If you have any questions, ask your nurse or doctor.               Stop taking these medicines if you haven't already. Please contact your care team if you have questions.     hydrochlorothiazide 25 MG tablet   Commonly known as:  HYDRODIURIL   Stopped by:  Minh Benavides MD           HYDROcodone-acetaminophen 5-325 MG per tablet   Commonly known as:  NORCO   Stopped by:  Minh Benavides MD           naproxen 500 MG tablet   Commonly known as:  NAPROSYN   Stopped by:  Minh Benavides MD                    Primary Care Provider Office Phone # Fax #    Minh Benavides [phone] [address] [place] [address]        Equal Access to Services     [facility]: Hadii stefano monge hadasho Soomaali, waaxda luqadaha, qaybta kaalmada adeegyada, waxay alf hayzulema mejia . So [facility] [phone].    ATENCIÓN: Si habla español, tiene a abbasi disposición servicios gratuitos de asistencia lingüística. [facility] [phone].    We comply with applicable federal civil rights laws and [place] laws. We do not discriminate on the basis of race, color, national origin, age, disability, sex, sexual orientation, or gender identity.            Thank you!     Thank you for choosing [facility]  for your care. Our goal is always to provide you with excellent care. Hearing back from our patients is one way we can continue to improve our services. Please take a few minutes to complete the written survey that you may receive in the mail after your visit with us. Thank you!             Your Updated Medication List - Protect others around you: Learn how to safely use, store and throw away your medicines at www.disposemymeds.org.          This list is accurate as of 10/23/18 11:20 AM.   Always use your most recent med list.                   Brand Name Dispense Instructions for use Diagnosis    ketoconazole 2 % cream    NIZORAL    15 g    Apply topically 2 times daily    Tinea cruris

## 2018-11-08 ENCOUNTER — ALLIED HEALTH/NURSE VISIT (OUTPATIENT)
Dept: FAMILY MEDICINE | Facility: CLINIC | Age: 53
End: 2018-11-08
Payer: COMMERCIAL

## 2018-11-08 VITALS — HEART RATE: 76 BPM | DIASTOLIC BLOOD PRESSURE: 96 MMHG | SYSTOLIC BLOOD PRESSURE: 132 MMHG

## 2018-11-08 DIAGNOSIS — R73.01 ELEVATED FASTING BLOOD SUGAR: ICD-10-CM

## 2018-11-08 DIAGNOSIS — Z01.30 BLOOD PRESSURE CHECK: Primary | ICD-10-CM

## 2018-11-08 DIAGNOSIS — Z83.3 FAMILY HISTORY OF DIABETES MELLITUS: ICD-10-CM

## 2018-11-08 DIAGNOSIS — E66.9 OBESITY (BMI 30-39.9): ICD-10-CM

## 2018-11-08 LAB — HBA1C MFR BLD: 6.2 % (ref 0–5.6)

## 2018-11-08 PROCEDURE — 83036 HEMOGLOBIN GLYCOSYLATED A1C: CPT | Mod: QW | Performed by: FAMILY MEDICINE

## 2018-11-08 PROCEDURE — 36415 COLL VENOUS BLD VENIPUNCTURE: CPT | Performed by: FAMILY MEDICINE

## 2018-11-08 PROCEDURE — 99207 ZZC NO CHARGE NURSE ONLY: CPT

## 2018-11-08 NOTE — NURSING NOTE
Da Akins is a 53 year old patient who comes in today for a Blood Pressure check.  Initial BP:  BP (!) 132/96  Pulse 76     76  Disposition: follow-up as previously indicated by provider and results routed to provider

## 2018-11-08 NOTE — PROGRESS NOTES
Da,  Your results show that your hemoglobin A1c is close to being in the diabetic range.  You have prediabetes and you may want to meet with our diabetic educator or nutritionist to discuss precautions that you can take to prevent yourself from advancing to actual diabetes.  Please let me know if you are interested in pursuing this further.  Another option would be for you to make a follow-up appoint with me to discuss this further.    Sincerely,  Dr. Benavides

## 2018-11-08 NOTE — MR AVS SNAPSHOT
After Visit Summary   11/8/2018    Da Akins    MRN: [id number]           Patient Information     Date Of Birth          1965        Visit Information        Provider Department      11/8/2018 2:30 PM ELZA DANE [facility]        Today's Diagnoses     Blood pressure check    -  1       Follow-ups after your visit        Your next 10 appointments already scheduled     Nov 08, 2018  2:30 PM CST   Nurse Only with [facility]    [address] [place] [id number] [phone]              Who to contact     If you have questions or need follow up information about today's clinic visit or your schedule please contact [facility] directly at [phone].  Normal or non-critical lab and imaging results will be communicated to you by [employer], letter or phone within 4 business days after the clinic has received the results. If you do not hear from us within 7 days, please contact the clinic through FastSpringhart or phone. If you have a critical or abnormal lab result, we will notify you by phone as soon as possible.  Submit refill requests through [employer] or call your pharmacy and they will forward the refill request to us. Please allow 3 business days for your refill to be completed.          Additional Information About Your Visit        MyChart Information     [employer] gives you secure access to your electronic health record. If you see a primary care provider, you can also send messages to your care team and make appointments. If you have questions, please call your primary care clinic.  If you do not have a primary care provider, please call [phone] and they will assist you.        Care EveryWhere ID     This is your Care EveryWhere ID. This could be used by other organizations to access your [place] medical records  [id number]        Your Vitals Were     Pulse                   76             Blood Pressure from Last 3 Encounters:   11/08/18 (!) 132/96   10/23/18 144/86   05/15/18 138/78    Weight from Last 3 Encounters:   10/23/18 211 lb 3.2 oz (95.8 kg)   05/15/18 209 lb 11.2 oz (95.1 kg)   10/25/17 206 lb 4 oz (93.6 kg)              Today, you had the following     No orders found for display       Primary Care Provider Office Phone # Fax #    Minhcandelario Benavides [phone] [address] [place] [address]        Equal Access to Services     [facility]: Hadii stefano monge hadasho Soomaali, waaxda luqadaha, qaybta kaalmada adesamantayada, patrick mejia . So [facility] [phone].    ATENCIÓN: Si habla español, tiene a abbasi disposición servicios gratuitos de asistencia lingüística. Llame al [phone].    We comply with applicable federal civil rights laws and [place] laws. We do not discriminate on the basis of race, color, national origin, age, disability, sex, sexual orientation, or gender identity.            Thank you!     Thank you for choosing [facility]  for your care. Our goal is always to provide you with excellent care. Hearing back from our patients is one way we can continue to improve our services. Please take a few minutes to complete the written survey that you may receive in the mail after your visit with us. Thank you!             Your Updated Medication List - Protect others around you: Learn how to safely use, store and throw away your medicines at www.disposemymeds.org.          This list is accurate as of 11/8/18 12:03 PM.  Always use your most recent med list.                   Brand Name Dispense Instructions for use Diagnosis    ketoconazole 2 % cream    NIZORAL    15 g    Apply topically 2 times daily    Tinea cruris

## 2019-03-29 ENCOUNTER — OFFICE VISIT (OUTPATIENT)
Dept: FAMILY MEDICINE | Facility: CLINIC | Age: 54
End: 2019-03-29
Payer: COMMERCIAL

## 2019-03-29 VITALS
RESPIRATION RATE: 22 BRPM | TEMPERATURE: 97.8 F | HEART RATE: 91 BPM | WEIGHT: 199.6 LBS | HEIGHT: 65 IN | SYSTOLIC BLOOD PRESSURE: 146 MMHG | BODY MASS INDEX: 33.26 KG/M2 | DIASTOLIC BLOOD PRESSURE: 92 MMHG | OXYGEN SATURATION: 100 %

## 2019-03-29 DIAGNOSIS — J20.9 ACUTE BRONCHITIS, UNSPECIFIED ORGANISM: Primary | ICD-10-CM

## 2019-03-29 DIAGNOSIS — B35.6 TINEA CRURIS: ICD-10-CM

## 2019-03-29 PROCEDURE — 99213 OFFICE O/P EST LOW 20 MIN: CPT | Performed by: NURSE PRACTITIONER

## 2019-03-29 RX ORDER — AZITHROMYCIN 250 MG/1
TABLET, FILM COATED ORAL
Qty: 6 TABLET | Refills: 0 | Status: SHIPPED | OUTPATIENT
Start: 2019-03-29 | End: 2019-07-26

## 2019-03-29 RX ORDER — KETOCONAZOLE 20 MG/G
CREAM TOPICAL 2 TIMES DAILY
Qty: 15 G | Refills: 1 | Status: SHIPPED | OUTPATIENT
Start: 2019-03-29 | End: 2019-07-26

## 2019-03-29 ASSESSMENT — MIFFLIN-ST. JEOR: SCORE: 1687.26

## 2019-03-29 ASSESSMENT — PAIN SCALES - GENERAL: PAINLEVEL: NO PAIN (0)

## 2019-03-29 NOTE — PROGRESS NOTES
"  SUBJECTIVE:   Da Akins is a 53 year old male who presents to clinic today for the following health issues:    Concern - cough  Onset: this week    Description:   Productive cough    Intensity: mild    Progression of Symptoms:  same    Accompanying Signs & Symptoms:  Yellow phlegm    Previous history of similar problem:   none    Precipitating factors:   Worsened by: none    Alleviating factors:  Improved by: nyquil    Therapies Tried and outcome: nyquil      Patient is seen in clinic today with a cough that is worsened over the past month.  He reports having had pneumonia in the past.  He does not feel like that at this time.  Denies chest pain, but feels congested.  Has a cough that is productive of lightly colored sputum.  He is a non-smoker.  Has no history of asthma.  He is also requesting a refill of his ketoconazole cream      Problem list and histories reviewed & adjusted, as indicated.  Additional history: as documented    BP Readings from Last 3 Encounters:   03/29/19 (!) 146/92   11/08/18 (!) 132/96   10/23/18 144/86    Wt Readings from Last 3 Encounters:   03/29/19 90.5 kg (199 lb 9.6 oz)   10/23/18 95.8 kg (211 lb 3.2 oz)   05/15/18 95.1 kg (209 lb 11.2 oz)                    Reviewed and updated as needed this visit by clinical staff       Reviewed and updated as needed this visit by Provider         ROS:  Constitutional, HEENT, cardiovascular, pulmonary, gi and gu systems are negative, except as otherwise noted.    OBJECTIVE:     BP (!) 146/92   Pulse 91   Temp 97.8  F (36.6  C) (Temporal)   Resp 22   Ht 1.651 m (5' 5\")   Wt 90.5 kg (199 lb 9.6 oz)   SpO2 100%   BMI 33.22 kg/m    Body mass index is 33.22 kg/m .   GENERAL: healthy, alert and no distress  NECK: no adenopathy, no asymmetry, masses, or scars and thyroid normal to palpation  RESP: Respiratory effort is unlabored.  O2 sats 100%.  He has good air exchange into the bases bilaterally with some harsh rhonchi  CV: regular rates and " rhythm, normal S1 S2, no S3 or S4 and no murmur, click or rub        ASSESSMENT/PLAN:     Problem List Items Addressed This Visit     None      Visit Diagnoses     Acute bronchitis, unspecified organism    -  Primary    Relevant Medications    azithromycin (ZITHROMAX) 250 MG tablet    Tinea cruris        Relevant Medications    ketoconazole (NIZORAL) 2 % external cream         Zithromax 500 mg day 1, 250 mg daily days 2 through 5  Increase oral fluids and humidification  Blood pressure tends to run on the high side.  Advised to schedule appointment with primary care provider for further follow-up    BP Screening:   Last 3 BP Readings:    BP Readings from Last 3 Encounters:   03/29/19 (!) 146/92   11/08/18 (!) 132/96   10/23/18 144/86       The following was recommended to the patient:  Re-screen within 4 weeks and recommend lifestyle modifications        SE[facility]

## 2019-07-12 ENCOUNTER — OFFICE VISIT (OUTPATIENT)
Dept: FAMILY MEDICINE | Facility: CLINIC | Age: 54
End: 2019-07-12
Payer: COMMERCIAL

## 2019-07-12 VITALS
TEMPERATURE: 96.9 F | DIASTOLIC BLOOD PRESSURE: 90 MMHG | BODY MASS INDEX: 34.2 KG/M2 | HEART RATE: 73 BPM | SYSTOLIC BLOOD PRESSURE: 144 MMHG | WEIGHT: 205.5 LBS | RESPIRATION RATE: 18 BRPM | OXYGEN SATURATION: 99 %

## 2019-07-12 DIAGNOSIS — I10 BENIGN ESSENTIAL HYPERTENSION: ICD-10-CM

## 2019-07-12 DIAGNOSIS — B34.9 VIRAL INFECTION: Primary | ICD-10-CM

## 2019-07-12 PROCEDURE — 99214 OFFICE O/P EST MOD 30 MIN: CPT | Performed by: NURSE PRACTITIONER

## 2019-07-12 RX ORDER — LISINOPRIL 10 MG/1
10 TABLET ORAL DAILY
Qty: 30 TABLET | Refills: 1 | Status: SHIPPED | OUTPATIENT
Start: 2019-07-12 | End: 2019-07-26 | Stop reason: SINTOL

## 2019-07-12 ASSESSMENT — PAIN SCALES - GENERAL: PAINLEVEL: NO PAIN (0)

## 2019-07-12 NOTE — NURSING NOTE
Pt was contacted and rescheduled for a 2 week F/U per  . Pt was in agreement. Lab orders placed for BMP and Lipid.

## 2019-07-12 NOTE — PATIENT INSTRUCTIONS
For your over the counter cold medicine that does not make your blood pressure worse.     - Coricidin Cold Medicine  - Lots of water  - Lots of rest  - Okay for tylenol for achiness.     High Blood pressure:     - Follow up with Dr. Benavides in 2 weeks with labs to check the blood pressure and get a medication check on the new lisinopril. [place] Dr. Benavides may need to adjust your medication to get your blood pressure into a healthy range.     Call clinic with any weakness or dizziness related to starting the new medication.       Patient Education     Viral Upper Respiratory Illness (Adult)    You have a viral upper respiratory illness (URI), which is another term for the common cold. This illness is contagious during the first few days. It is spread through the air by coughing and sneezing. It may also be spread by direct contact (touching the sick person and then touching your own eyes, nose, or mouth). Frequent handwashing will decrease risk of spread. Most viral illnesses go away within 7 to 10 days with rest and simple home remedies. Sometimes the illness may last for several weeks. Antibiotics will not kill a virus, and they are generally not prescribed for this condition.  Home care    If symptoms are severe, rest at home for the first 2 to 3 days. When you resume activity, don't let yourself get too tired.    Don't smoke. If you need help stopping, talk with your healthcare provider.    Avoid being exposed to cigarette smoke (yours or others ).    You may use acetaminophen or ibuprofen to control pain and fever, unless another medicine was prescribed. If you have chronic liver or kidney disease, have ever had a stomach ulcer or gastrointestinal bleeding, or are taking blood-thinning medicines, talk with your healthcare provider before using these medicines. Aspirin should never be given to anyone under 18 years of age who is ill with a viral infection or fever. It may cause severe liver or brain  damage.    Your appetite may be poor, so a light diet is fine. Stay well hydrated by drinking 6 to 8 glasses of fluids per day (water, soft drinks, juices, tea, or soup). Extra fluids will help loosen secretions in the nose and lungs.    Over-the-counter cold medicines will not shorten the length of time you re sick, but they may be helpful for the following symptoms: cough, sore throat, and nasal and sinus congestion. If you take prescription medicines, ask your healthcare provider or pharmacist which over-the-counter medicines are safe to use. (Note: Don't use decongestants if you have high blood pressure.)  Follow-up care  Follow up with your healthcare provider, or as advised.  When to seek medical advice  Call your healthcare provider right away if any of these occur:    Cough with lots of colored sputum (mucus)    Severe headache; face, neck, or ear pain    Difficulty swallowing due to throat pain    Fever of 100.4 F (38 C) or higher, or as directed by your healthcare provider  Call 911  Call 911 if any of these occur:    Chest pain, shortness of breath, wheezing, or difficulty breathing    Coughing up blood    Very severe pain with swallowing, especially if it goes along with a muffled voice   Date Last Reviewed: 6/1/2018 2000-2018 [employer]. [address] [place] [address], [place] [address]. All rights reserved. This information is not intended as a substitute for professional medical care. Always follow your healthcare professional's instructions.               Patient Education     Discharge Instructions for High Blood Pressure (Hypertension)  You have been diagnosed with high blood pressure (also called hypertension). This means the force of blood against your artery walls is too strong. It also means your heart is working hard to move blood. High blood pressure usually has no symptoms, but over time, it can cause serious health problems. High blood pressure raises your risk for heart attack,  "stroke, heart failure, kidney disease, and blindness. With help from your doctor, you can manage your blood pressure and protect your health.  Blood pressure measurements are given as 2 numbers. Systolic blood pressure is the upper number. This is the pressure when the heart contracts. Diastolic blood pressure is the lower number. This is the pressure when the heart relaxes between beats.  Blood pressure is categorized as normal, elevated, or stage 1 or stage 2 high blood pressure:    Normal blood pressure is systolic of less than 120 and diastolic of less than 80 (120/80)    Elevated blood pressure is systolic of 120 to 129 and diastolic less than 80    Stage 1 high blood pressure is systolic is 130 to 139 or diastolic between 80 to 89    Stage 2 high blood pressure is when systolic is 140 or higher or the diastolic is 90 or higher  Taking medicine    Learn to take your own blood pressure. Keep a record of your results. Ask your doctor which readings mean that you need medical attention.    Take your blood pressure medicine exactly as directed. Don t skip doses. Missing doses can cause your blood pressure to get out of control.    If you do miss a dose (or doses) check with your healthcare provider about what to do.    Don't take medicines that contain heart stimulants, including over-the-counter medicines. Check for warnings about high blood pressure on the label. Ask the pharmacist before purchasing something you haven't used before    Check with your doctor or pharmacist before taking a decongestant. Some decongestants can worsen high blood pressure.  Lifestyle changes    Maintain a healthy weight. Get help to lose any extra pounds.    Cut back on salt.  ? Limit canned, dried, packaged, and fast foods.  ? Don t add salt to your food at the table.  ? Season foods with herbs instead of salt when you cook.  ? Request no added salt when you go to a restaurant.  ? The American Heart Association (AHA) says the \"ideal\" " amount of sodium is no more than 1,500 mg a day.  But because Americans eat so much salt, you can make a positive change by cutting back to even 2,400 mg of sodium a day.     Follow the DASH (Dietary Approaches to Stop Hypertension) eating plan. This plan recommends vegetables, fruits, whole gains, and other heart healthy foods.    Begin an exercise program. Ask your healthcare provider how to get started. The AHA recommends aerobic exercise 3 to 4 times a week for an average of 40 minutes at a time, with your provider's approval. Simple activities like walking or gardening can help.    Break the smoking habit. Enroll in a stop-smoking program to improve your chances of success. Ask your healthcare provider about programs and medicines to help you stop smoking.    Limit drinks that contain caffeine such as coffee, black or green tea, and cola to 2 per day.    Never take stimulants such as amphetamines or cocaine. These drugs can be deadly for someone with high blood pressure.    Control your stress. Learn ways to manage stress.    Limit alcohol to no more than 1 drink a day for women and 2 drinks a day for men.  Follow-up care  Make a follow-up appointment as directed.  When to call your healthcare provider  Call your healthcare provider right away if you have any of the following:    Chest pain or shortness of breath (call 911)    Moderate to severe headache    Weakness in the muscles of your face, arms, or legs    Trouble speaking    Extreme drowsiness    Confusion    Fainting or dizziness    Pulsating or rushing sound in your ears    Unexplained nosebleed    Weakness, tingling, or numbness of your face, arms, or legs    Change in vision    Blood pressure measured at home that is greater than 180/110   Date Last Reviewed: 4/27/2016 2000-2018 [employer]. [address] [place] [address]. All rights reserved. This information is not intended as a substitute for professional medical care.  Always follow your healthcare professional's instructions.

## 2019-07-12 NOTE — PROGRESS NOTES
Subjective     Da Akins is a 54 year old male who presents to clinic today for the following health issues:    Long presents today with upper respiratory symptoms of nasal congestion, sore throat, fatigue and decreased appetite.  Children had a abdominal virus about 2 weeks ago with nausea vomiting and diarrhea he is asymptomatic for any the symptoms.  He is afebrile.  More of the symptoms are with the nasal congestion.  Symptoms started last Sunday so has been sick for about 5 days.  Is been taking NyQuil at night but no other medication.      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  He was given a 90-day temporary provisional license which indicates the need to get the blood pressure within normal range.  Blood pressure today is 148/100.  He has no new headaches or visual changes, no chest pain or palpitations, no dizziness, no history of MI or stroke.  We reviewed his blood pressures he is been out of normal range 130/80,  for most of 2018. He has been planning on working on improving his diet and exercising more but is realizing that he is not sure he can make the changes he needs to to get the blood pressure in range for work in the time he has been given.       HPI   Acute Illness   Acute illness concerns: Head cold   Onset: Started Sunday     Fever: no    Chills/Sweats: no    Headache (location?): no    Sinus Pressure:no    Conjunctivitis:  no    Ear Pain: no    Rhinorrhea: no    Congestion: YES    Sore Throat: YES     Cough: YES-productive of yellow sputum    Wheeze: YES    Decreased Appetite: YES    Nausea: no    Vomiting: no    Diarrhea:  no    Dysuria/Freq.: no    Fatigue/Achiness: YES- body aches    Sick/Strep Exposure: YES- son was sick a few weeks ago      Therapies Tried and outcome: OTC cough medicine         Patient Active Problem List   Diagnosis     Family history of diabetes  mellitus     Obesity (BMI 30-39.9)     Past Surgical History:   Procedure Laterality Date     ANKLE SURGERY       gnaglion cyst remove Right        Social History     Tobacco Use     Smoking status: Never Smoker     Smokeless tobacco: Never Used   Substance Use Topics     Alcohol use: No     Alcohol/week: 0.0 oz     Family History   Problem Relation Age of Onset     Diabetes Father      Hypertension Father      Cerebrovascular Disease Father      Colon Cancer Father 50     Colon Cancer Mother 72     Cerebrovascular Disease Maternal Grandfather      Coronary Artery Disease No family hx of      Hyperlipidemia No family hx of          Current Outpatient Medications   Medication Sig Dispense Refill     lisinopril (PRINIVIL/ZESTRIL) 10 MG tablet Take 1 tablet (10 mg) by mouth daily 30 tablet 1     azithromycin (ZITHROMAX) 250 MG tablet Two tablets first day, then one tablet daily for four days (Patient not taking: Reported on 7/12/2019) 6 tablet 0     ketoconazole (NIZORAL) 2 % external cream Apply topically 2 times daily (Patient not taking: Reported on 7/12/2019) 15 g 1     Allergies   Allergen Reactions     No Known Allergies      Seasonal Allergies      BP Readings from Last 3 Encounters:   07/12/19 144/90   03/29/19 (!) 146/92   11/08/18 (!) 132/96    Wt Readings from Last 3 Encounters:   07/12/19 93.2 kg (205 lb 8 oz)   03/29/19 90.5 kg (199 lb 9.6 oz)   10/23/18 95.8 kg (211 lb 3.2 oz)            Reviewed and updated as needed this visit by Provider         Review of Systems   ROS COMP: Constitutional, HEENT, cardiovascular, pulmonary, gi and gu systems are negative, except as otherwise noted.      Objective    There were no vitals taken for this visit.  There is no height or weight on file to calculate BMI.  Physical Exam   GENERAL: alert, no distress, over weight and fatigued  NECK: no adenopathy, no asymmetry, masses, or scars and thyroid normal to palpation  RESP: lungs clear to auscultation - no rales,  "rhonchi or wheezes  CV: regular rate and rhythm, normal S1 S2, no S3 or S4, no murmur, click or rub, no peripheral edema and peripheral pulses strong  ABDOMEN: soft, nontender, no hepatosplenomegaly, no masses and bowel sounds normal  MS: no gross musculoskeletal defects noted, no edema    Diagnostic Test Results:  Labs reviewed in [facility]  CMP today.         Assessment & Plan     1. Viral infection  - Discussed home care to include rest, pushing fluids, and Over the counter cold medicine that does not affect  Blood pressure.  - Discussed signs and symptoms to report with worsening symptoms.     2. Benign essential hypertension  - Dr. Benavides updated. Patient will follow up with Primary care provider in 2 weeks with fasting lipids, BMP, for med and B/P check.   - Comprehensive metabolic panel - will get LFT's checked for baseline levels as we are starting the lisinopril today.   - lisinopril (PRINIVIL/ZESTRIL) 10 MG tablet; Take 1 tablet (10 mg) by mouth daily  Dispense: 30 tablet; Refill: 1  - Basic metabolic panel  (Ca, Cl, CO2, Creat, Gluc, K, Na, BUN); Future  - Lipid Profile (Chol, Trig, HDL, LDL calc); Future     BMI:   Estimated body mass index is 34.2 kg/m  as calculated from the following:    Height as of 3/29/19: 1.651 m (5' 5\").    Weight as of this encounter: 93.2 kg (205 lb 8 oz).   Weight management plan: Discussed healthy diet and exercise guidelines        See Patient Instructions      Miky Acosta NP  [facility]        "

## 2019-07-26 ENCOUNTER — OFFICE VISIT (OUTPATIENT)
Dept: FAMILY MEDICINE | Facility: CLINIC | Age: 54
End: 2019-07-26
Payer: COMMERCIAL

## 2019-07-26 VITALS
BODY MASS INDEX: 33.88 KG/M2 | TEMPERATURE: 97.3 F | OXYGEN SATURATION: 97 % | DIASTOLIC BLOOD PRESSURE: 90 MMHG | HEART RATE: 73 BPM | WEIGHT: 203.6 LBS | SYSTOLIC BLOOD PRESSURE: 150 MMHG

## 2019-07-26 DIAGNOSIS — T88.7XXA MEDICATION SIDE EFFECT: ICD-10-CM

## 2019-07-26 DIAGNOSIS — I10 BENIGN ESSENTIAL HYPERTENSION: Primary | ICD-10-CM

## 2019-07-26 DIAGNOSIS — E78.2 MIXED HYPERLIPIDEMIA: ICD-10-CM

## 2019-07-26 PROCEDURE — 99214 OFFICE O/P EST MOD 30 MIN: CPT | Performed by: FAMILY MEDICINE

## 2019-07-26 RX ORDER — LOSARTAN POTASSIUM 50 MG/1
50 TABLET ORAL DAILY
Qty: 90 TABLET | Refills: 0 | Status: SHIPPED | OUTPATIENT
Start: 2019-07-26 | End: 2019-11-04

## 2019-07-26 NOTE — PATIENT INSTRUCTIONS
Do not eat or drink anything besides plain water for 8 hours before labs.    My Fitness Pal:  Load this onto your smart phone or go online.  [email or web address]. Make your goal of 1-2 pounds of weight loss per week.

## 2019-07-30 NOTE — PROGRESS NOTES
SUBJECTIVE:  Da is a 54 year old male who comes in today for recheck of hypertension.  Was seen by colleague 2 weeks ago and started on lisinopril 10 mg.  Developed a cough on this medication and his blood pressure is still not at goal.      Patient also has hyperlipidemia.  Due for lab work.      Review of Systems   10 point ROS of systems including Constitutional, Eyes, HENT, Respiratory, Cardiovascular, Gastroenterology, Genitourinary, Integumentary, Muscularskeletal, Psychiatric were all negative except for pertinent positives noted in my HPI.     OBJECTIVE:  /90   Pulse 73   Temp 97.3  F (36.3  C) (Temporal)   Wt 92.4 kg (203 lb 9.6 oz)   SpO2 97%   BMI 33.88 kg/m    Body mass index is 33.88 kg/m .  Physical Exam   Constitutional: He appears well-developed and well-nourished.   Cardiovascular: Normal rate, regular rhythm, S1 normal, S2 normal and normal heart sounds.   No murmur heard.  Pulmonary/Chest: Effort normal and breath sounds normal. No respiratory distress. He has no wheezes. He has no rhonchi. He has no rales.   Musculoskeletal: He exhibits no edema.   Neurological: He is alert.       ASSESSMENT/ORDERS:    ICD-10-CM    1. Benign essential hypertension I10 losartan (COZAAR) 50 MG tablet     Lipid panel reflex to direct LDL Fasting     Albumin Random Urine Quantitative with Creat Ratio     Basic metabolic panel   2. Mixed hyperlipidemia E78.2 Lipid panel reflex to direct LDL Fasting   3. Medication side effect T88.7XXA      PLAN:  1.  Switch lisinopril to losartan and dosed it higher due to elevated blood pressure.  Lab and nurse visit in 2 weeks to get blood pressure rechecked to see that it is at goal and he will get fasting labs.  If blood pressure not at goal, will bump losartan to 100 mg/day.  If at goal, he has enough medication to get him to his next well visit in about 3 months.      Follow up with Provider - Return in about 2 weeks (around 8/9/2019) for lab visit and nurse blood  pressure check.      Minh Benavides MD

## 2019-08-02 ENCOUNTER — OFFICE VISIT (OUTPATIENT)
Dept: FAMILY MEDICINE | Facility: CLINIC | Age: 54
End: 2019-08-02
Payer: COMMERCIAL

## 2019-08-02 VITALS
OXYGEN SATURATION: 97 % | TEMPERATURE: 95.4 F | BODY MASS INDEX: 33.95 KG/M2 | RESPIRATION RATE: 18 BRPM | WEIGHT: 204 LBS | HEART RATE: 99 BPM | SYSTOLIC BLOOD PRESSURE: 132 MMHG | DIASTOLIC BLOOD PRESSURE: 92 MMHG

## 2019-08-02 DIAGNOSIS — R05.9 COUGH: Primary | ICD-10-CM

## 2019-08-02 PROCEDURE — 99213 OFFICE O/P EST LOW 20 MIN: CPT | Performed by: FAMILY MEDICINE

## 2019-08-02 RX ORDER — AZITHROMYCIN 250 MG/1
TABLET, FILM COATED ORAL
Qty: 6 TABLET | Refills: 0 | Status: SHIPPED | OUTPATIENT
Start: 2019-08-02 | End: 2020-03-10

## 2019-08-02 ASSESSMENT — PAIN SCALES - GENERAL: PAINLEVEL: NO PAIN (0)

## 2019-08-02 NOTE — PROGRESS NOTES
Subjective     Acute Illness   Acute illness concerns: cough   Onset: one month    Fever: no    Chills/Sweats: no    Headache (location?): no    Sinus Pressure:no    Conjunctivitis:  no    Ear Pain: no    Rhinorrhea: no    Congestion: no    Sore Throat: no     Cough: YES    Wheeze: no    Decreased Appetite: no    Nausea: no    Vomiting: no    Diarrhea:  no    Dysuria/Freq.: no    Fatigue/Achiness: no    Sick/Strep Exposure: YES     Therapies Tried and outcome: none         Da is a 54 year old male who comes to clinic to discuss     Review of Systems   ROS COMP: Constitutional, HEENT, cardiovascular, pulmonary, gi and gu systems are negative, except as otherwise noted.      Objective    BP (!) 132/92   Pulse 99   Temp 95.4  F (35.2  C) (Temporal)   Resp 18   Wt 92.5 kg (204 lb)   SpO2 97%   BMI 33.95 kg/m       Wt Readings from Last 2 Encounters:   08/09/19 92.5 kg (204 lb)   08/02/19 92.5 kg (204 lb)       Physical Exam   GENERAL: healthy, alert and no distress  NECK: no adenopathy, no asymmetry, masses, or scars and thyroid normal to palpation  RESP: lungs clear to auscultation - no rales, rhonchi or wheezes  CV: regular rate and rhythm, normal S1 S2, no S3 or S4, no murmur, click or rub, no peripheral edema and peripheral pulses strong  ABDOMEN: soft, nontender, no hepatosplenomegaly, no masses and bowel sounds normal  MS: no gross musculoskeletal defects noted, no edema    Diagnostic Test Results:  Labs reviewed in Epic        Assessment & Plan       ICD-10-CM    1. Cough R05 azithromycin (ZITHROMAX) 250 MG tablet     Likely bronchitis with symptoms lasting greater than a week.  Trial of azithromycin, return if not improved for reassessment  Jose Montanez MD  [facility]

## 2019-08-09 ENCOUNTER — ALLIED HEALTH/NURSE VISIT (OUTPATIENT)
Dept: FAMILY MEDICINE | Facility: CLINIC | Age: 54
End: 2019-08-09
Payer: COMMERCIAL

## 2019-08-09 VITALS
TEMPERATURE: 98.5 F | DIASTOLIC BLOOD PRESSURE: 82 MMHG | SYSTOLIC BLOOD PRESSURE: 130 MMHG | OXYGEN SATURATION: 98 % | RESPIRATION RATE: 18 BRPM | HEART RATE: 74 BPM | WEIGHT: 204 LBS | BODY MASS INDEX: 33.99 KG/M2 | HEIGHT: 65 IN

## 2019-08-09 DIAGNOSIS — E78.2 MIXED HYPERLIPIDEMIA: Primary | ICD-10-CM

## 2019-08-09 DIAGNOSIS — E78.2 MIXED HYPERLIPIDEMIA: ICD-10-CM

## 2019-08-09 DIAGNOSIS — I10 BENIGN ESSENTIAL HYPERTENSION: ICD-10-CM

## 2019-08-09 LAB
ANION GAP SERPL CALCULATED.3IONS-SCNC: 8 MMOL/L (ref 3–14)
BUN SERPL-MCNC: 16 MG/DL (ref 7–30)
CALCIUM SERPL-MCNC: 8.6 MG/DL (ref 8.5–10.1)
CHLORIDE SERPL-SCNC: 106 MMOL/L (ref 94–109)
CHOLEST SERPL-MCNC: 196 MG/DL
CO2 SERPL-SCNC: 27 MMOL/L (ref 20–32)
CREAT SERPL-MCNC: 0.86 MG/DL (ref 0.66–1.25)
CREAT UR-MCNC: 166 MG/DL
GFR SERPL CREATININE-BSD FRML MDRD: >90 ML/MIN/{1.73_M2}
GLUCOSE SERPL-MCNC: 117 MG/DL (ref 70–99)
HDLC SERPL-MCNC: 36 MG/DL
LDLC SERPL CALC-MCNC: ABNORMAL MG/DL
LDLC SERPL DIRECT ASSAY-MCNC: 115 MG/DL
MICROALBUMIN UR-MCNC: 11 MG/L
MICROALBUMIN/CREAT UR: 6.57 MG/G CR (ref 0–17)
NONHDLC SERPL-MCNC: 160 MG/DL
POTASSIUM SERPL-SCNC: 3.9 MMOL/L (ref 3.4–5.3)
SODIUM SERPL-SCNC: 141 MMOL/L (ref 133–144)
TRIGL SERPL-MCNC: 414 MG/DL

## 2019-08-09 PROCEDURE — 83721 ASSAY OF BLOOD LIPOPROTEIN: CPT | Mod: 59 | Performed by: FAMILY MEDICINE

## 2019-08-09 PROCEDURE — 99207 ZZC NO BILLABLE SERVICE THIS VISIT: CPT

## 2019-08-09 PROCEDURE — 82043 UR ALBUMIN QUANTITATIVE: CPT | Performed by: FAMILY MEDICINE

## 2019-08-09 PROCEDURE — 80048 BASIC METABOLIC PNL TOTAL CA: CPT | Performed by: FAMILY MEDICINE

## 2019-08-09 PROCEDURE — 80061 LIPID PANEL: CPT | Performed by: FAMILY MEDICINE

## 2019-08-09 PROCEDURE — 36415 COLL VENOUS BLD VENIPUNCTURE: CPT | Performed by: FAMILY MEDICINE

## 2019-08-09 ASSESSMENT — PAIN SCALES - GENERAL: PAINLEVEL: NO PAIN (0)

## 2019-08-09 ASSESSMENT — MIFFLIN-ST. JEOR: SCORE: 1702.22

## 2019-08-23 NOTE — RESULT ENCOUNTER NOTE
Da,  Your results show that your triglycerides are high in your cholesterol testing.  It is high enough that we would recommend medication to lower them since this puts you at higher risk of developing severe inflammation in your pancreas called pancreatitis.  You should come in for a discussion on medication for this or you can set up a telephone visit so we can discuss this over the phone.   You blood sugar continues to be high and we should monitor this yearly.  The rest of your labs were fine.    Sincerely,  Dr. Benavides       Diabetic: No      Tobacco smoker: No      Systolic Blood Pressure: 130 mmHg      Is BP treated: Yes      HDL Cholesterol: 36 mg/dL      Total Cholesterol: 196 mg/dL

## 2019-11-04 DIAGNOSIS — I10 BENIGN ESSENTIAL HYPERTENSION: ICD-10-CM

## 2019-11-04 RX ORDER — LOSARTAN POTASSIUM 50 MG/1
50 TABLET ORAL DAILY
Qty: 90 TABLET | Refills: 0 | Status: SHIPPED | OUTPATIENT
Start: 2019-11-04 | End: 2020-01-27

## 2019-11-04 NOTE — TELEPHONE ENCOUNTER
Reason for Call:  Medication or medication refill:    Do you use a [facility]?  Name of the pharmacy and phone number for the current request:  [facility] in [place]    Name of the medication requested: losartan    Other request: needs to be a 90 day supply    Can we leave a detailed message on this number? YES    Phone number patient can be reached at: Cell number on file:    Telephone Information:   Mobile [phone]       Best Time:     Call taken on 11/4/2019 at 1:26 PM by Colleen Enriquez

## 2019-11-04 NOTE — TELEPHONE ENCOUNTER
"Losartan  Last Written Prescription Date:  07/26/2019  Last Fill Quantity: 90,  # refills: 0   Last office visit: 8/9/2019 with prescribing provider:     Future Office Visit:      Requested Prescriptions   Pending Prescriptions Disp Refills     losartan (COZAAR) 50 MG tablet 90 tablet 3     Sig: Take 1 tablet (50 mg) by mouth daily       Angiotensin-II Receptors Passed - 11/4/2019  2:03 PM        Passed - Last blood pressure under 140/90 in past 12 months     BP Readings from Last 3 Encounters:   08/09/19 130/82   08/02/19 (!) 132/92   07/26/19 150/90                 Passed - Recent (12 mo) or future (30 days) visit within the authorizing provider's specialty     Patient has had an office visit with the authorizing provider or a provider within the authorizing providers department within the previous 12 mos or has a future within next 30 days. See \"Patient Info\" tab in inbasket, or \"Choose Columns\" in Meds & Orders section of the refill encounter.              Passed - Medication is active on med list        Passed - Patient is age 18 or older        Passed - Normal serum creatinine on file in past 12 months     Recent Labs   Lab Test 08/09/19  0855   CR 0.86             Passed - Normal serum potassium on file in past 12 months     Recent Labs   Lab Test 08/09/19  0855   POTASSIUM 3.9                      "

## 2019-12-09 ENCOUNTER — HEALTH MAINTENANCE LETTER (OUTPATIENT)
Age: 54
End: 2019-12-09

## 2020-01-24 DIAGNOSIS — Z30.2 ENCOUNTER FOR VASECTOMY: ICD-10-CM

## 2020-01-24 LAB — SPERM P VAS SMN QL MICRO: NORMAL

## 2020-01-24 PROCEDURE — 89321 SEMEN ANAL SPERM DETECTION: CPT | Performed by: UROLOGY

## 2020-01-27 ENCOUNTER — TELEPHONE (OUTPATIENT)
Dept: FAMILY MEDICINE | Facility: CLINIC | Age: 55
End: 2020-01-27

## 2020-01-27 DIAGNOSIS — I10 BENIGN ESSENTIAL HYPERTENSION: ICD-10-CM

## 2020-01-27 RX ORDER — LOSARTAN POTASSIUM 50 MG/1
TABLET ORAL
Qty: 30 TABLET | Refills: 0 | Status: SHIPPED | OUTPATIENT
Start: 2020-01-27 | End: 2020-01-28

## 2020-01-27 NOTE — TELEPHONE ENCOUNTER
"Losartan  Last Written Prescription Date:  11/04/2019  Last Fill Quantity: 90,  # refills: 0   Last office visit: 07/26/2019 with prescribing provider:  Kennedy   Future Office Visit:      Requested Prescriptions   Pending Prescriptions Disp Refills     losartan (COZAAR) 50 MG tablet [Pharmacy Med Name: LOSARTAN POTASSIUM 50 MG TAB] 90 tablet 0     Sig: TAKE 1 TABLET BY MOUTH EVERY DAY       Angiotensin-II Receptors Passed - 1/27/2020 12:35 PM        Passed - Last blood pressure under 140/90 in past 12 months     BP Readings from Last 3 Encounters:   08/09/19 130/82   08/02/19 (!) 132/92   07/26/19 150/90                 Passed - Recent (12 mo) or future (30 days) visit within the authorizing provider's specialty     Patient has had an office visit with the authorizing provider or a provider within the authorizing providers department within the previous 12 mos or has a future within next 30 days. See \"Patient Info\" tab in inbasket, or \"Choose Columns\" in Meds & Orders section of the refill encounter.              Passed - Medication is active on med list        Passed - Patient is age 18 or older        Passed - Normal serum creatinine on file in past 12 months     Recent Labs   Lab Test 08/09/19  0855   CR 0.86             Passed - Normal serum potassium on file in past 12 months     Recent Labs   Lab Test 08/09/19  0855   POTASSIUM 3.9                  Medication is being filled for 1 time refill only due to:  Patient needs to be seen because follow up blood pressure.  Callie Sanches RN BSN    "

## 2020-01-27 NOTE — TELEPHONE ENCOUNTER
Left message for patient to call back and speak with any .        Avis Chavira ~ Patient Representative  [address] [address]  [email or web address]  [email or web address]  Office:  [phone]  Fax:  [phone]

## 2020-01-28 RX ORDER — LOSARTAN POTASSIUM 50 MG/1
50 TABLET ORAL DAILY
Qty: 90 TABLET | Refills: 1 | Status: SHIPPED | OUTPATIENT
Start: 2020-01-28 | End: 2020-03-10

## 2020-01-28 NOTE — TELEPHONE ENCOUNTER
Patient's blood pressure was rechecked by nurse this summer and was at goal.  I am not sure why his medication refills were not extended at that time, but he is fine to get refills until this summer.  He needs to plan and see me before these refills run out.    Will have staff notify patient.  This was an oversight on my part and it is now corrected for him.    Minh Benavides MD

## 2020-01-28 NOTE — TELEPHONE ENCOUNTER
"Received a fax from the pharmacy stating, \"Must be 90 days to be covered.\"    Please advise.     Jazmin Miller CMA ([facility])    "

## 2020-01-29 NOTE — TELEPHONE ENCOUNTER
Patient returned call, he was given the message from Dr Benavides, he declined to schedule a future appointment at this time but will call back to schedule prior to his medication refills being out.

## 2020-01-29 NOTE — TELEPHONE ENCOUNTER
Called patient and left a voicemail to call the clinic back, when he calls back please see message below.    Jazmin Garcia, RIKY

## 2020-02-25 ENCOUNTER — TELEPHONE (OUTPATIENT)
Dept: FAMILY MEDICINE | Facility: CLINIC | Age: 55
End: 2020-02-25

## 2020-02-25 NOTE — TELEPHONE ENCOUNTER
Patient needs evaluation for discussion on hip injection.  Need to make sure this is still medically appropriate and not something else causing his pain.  Last injection was 2.5 years ago.    Will have staff notify patient to help schedule appointment for evaluation.    Minh Benavides MD

## 2020-02-25 NOTE — TELEPHONE ENCOUNTER
Reason for Call:  Other referral     Detailed comments: asking for Dr Benavides to place referral for him to get Cortizone injection in the right hip. States he has had this done in the past. Please have someone call to schedule.     Phone Number Patient can be reached at: Home number on file [phone] (home)    Best Time: any time    Can we leave a detailed message on this number? YES    Call taken on 2/25/2020 at 11:32 AM by Belkis Damon

## 2020-03-10 ENCOUNTER — OFFICE VISIT (OUTPATIENT)
Dept: FAMILY MEDICINE | Facility: CLINIC | Age: 55
End: 2020-03-10
Payer: COMMERCIAL

## 2020-03-10 VITALS
HEART RATE: 70 BPM | BODY MASS INDEX: 35.16 KG/M2 | SYSTOLIC BLOOD PRESSURE: 132 MMHG | DIASTOLIC BLOOD PRESSURE: 74 MMHG | OXYGEN SATURATION: 100 % | RESPIRATION RATE: 18 BRPM | WEIGHT: 211 LBS | TEMPERATURE: 98.4 F | HEIGHT: 65 IN

## 2020-03-10 DIAGNOSIS — E66.01 SEVERE OBESITY (BMI 35.0-39.9) WITH COMORBIDITY (H): ICD-10-CM

## 2020-03-10 DIAGNOSIS — I10 BENIGN ESSENTIAL HYPERTENSION: ICD-10-CM

## 2020-03-10 DIAGNOSIS — M16.11 ARTHRITIS OF RIGHT HIP: Primary | ICD-10-CM

## 2020-03-10 PROCEDURE — 99214 OFFICE O/P EST MOD 30 MIN: CPT | Performed by: FAMILY MEDICINE

## 2020-03-10 RX ORDER — LOSARTAN POTASSIUM 100 MG/1
100 TABLET ORAL DAILY
Qty: 90 TABLET | Refills: 2 | Status: SHIPPED | OUTPATIENT
Start: 2020-03-10 | End: 2020-12-29

## 2020-03-10 ASSESSMENT — PAIN SCALES - GENERAL: PAINLEVEL: SEVERE PAIN (6)

## 2020-03-10 ASSESSMENT — MIFFLIN-ST. JEOR: SCORE: 1733.97

## 2020-03-10 NOTE — PROGRESS NOTES
"Subjective     Da Akins is a 54 year old male who presents to clinic today for the following health issues:    HPI   Referral to get another right hip injection.         Patient had right hip injection for arthritic type pain and early arthritic findings on x-ray 2.5 years ago.  Was seen by a colleague at that time.  Got good pain relief until about 5-6 months ago.  Notes that pain returns with sitting in hard chairs and long walks.  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 No weakness of his leg.    History of hypertension.  Has not had systolic blood pressure below 130 since changing from ACE-I to losartan.  No medication side effects.    Has had cough for 1-2 weeks that is starting to improve.  Mild sore throat prior to this starting.    Reviewed and updated as needed this visit by Provider  Tobacco  Allergies  Meds  Problems  Med Hx  Surg Hx  Fam Hx         Review of Systems   ROS COMP: Constitutional, HEENT, cardiovascular, pulmonary, GI, , musculoskeletal, neuro, skin, endocrine and psych systems are negative, except as otherwise noted.      Objective    /74   Pulse 70   Temp 98.4  F (36.9  C) (Temporal)   Resp 18   Ht 1.651 m (5' 5\")   Wt 95.7 kg (211 lb)   SpO2 100%   BMI 35.11 kg/m    Body mass index is 35.11 kg/m .  Physical Exam  Constitutional:       Appearance: He is well-developed. He is obese.   Cardiovascular:      Rate and Rhythm: Normal rate and regular rhythm.      Heart sounds: Normal heart sounds, S1 normal and S2 normal. No murmur.   Pulmonary:      Effort: Pulmonary effort is normal. No respiratory distress.      Breath sounds: Normal breath sounds. No wheezing, rhonchi or rales.   Neurological:      Mental Status: He is alert.        Right Hip Exam   Right hip exam is normal.       Left Hip Exam     Range of Motion   Abduction: normal   Adduction: normal   Extension: normal   Left hip flexion: mild discomfort " "with full flexion.   External rotation: normal   Left hip internal rotation: pain with minimal rotation.     Muscle Strength   The patient has normal left hip strength.     Tests   ALEX: negative                     Assessment & Plan     ASSESSMENT/ORDERS:    ICD-10-CM    1. Arthritis of right hip  M16.11 XR Pelvis 1/2 Views     XR Joint Injection Major Right     CANCELED: IR Joint Injection Major Right   2. Benign essential hypertension  I10 losartan (COZAAR) 100 MG tablet     **Basic metabolic panel FUTURE anytime   3. Severe obesity (BMI 35.0-39.9) with comorbidity (H)  E66.01      PLAN:  1.  Repeat hip x-ray to see if any worsening changes.  Will also repeat corticosteroid injection of right hip.  Procedure ordered today.  2.  Increase losartan to 100 mg/day.  return for lab visit in 2 weeks.   3.  He will work on weight loss as his hip pain improves.      BMI:   Estimated body mass index is 35.11 kg/m  as calculated from the following:    Height as of this encounter: 1.651 m (5' 5\").    Weight as of this encounter: 95.7 kg (211 lb).               Return in about 6 months (around 9/10/2020) for medication recheck.    Minh Benavides MD  [facility]    "

## 2020-03-13 ENCOUNTER — HOSPITAL ENCOUNTER (OUTPATIENT)
Dept: GENERAL RADIOLOGY | Facility: CLINIC | Age: 55
Discharge: HOME | End: 2020-03-13
Attending: FAMILY MEDICINE | Admitting: FAMILY MEDICINE
Payer: COMMERCIAL

## 2020-03-13 DIAGNOSIS — M16.11 ARTHRITIS OF RIGHT HIP: ICD-10-CM

## 2020-03-13 PROCEDURE — 20610 DRAIN/INJ JOINT/BURSA W/O US: CPT | Mod: RT

## 2020-03-13 PROCEDURE — 25500064 ZZH RX 255 OP 636: Performed by: RADIOLOGY

## 2020-03-13 PROCEDURE — 25000128 H RX IP 250 OP 636: Performed by: RADIOLOGY

## 2020-03-13 PROCEDURE — 25000125 ZZHC RX 250: Performed by: RADIOLOGY

## 2020-03-13 RX ORDER — BUPIVACAINE HYDROCHLORIDE 2.5 MG/ML
10 INJECTION, SOLUTION EPIDURAL; INFILTRATION; INTRACAUDAL ONCE
Status: DISCONTINUED | OUTPATIENT
Start: 2020-03-13 | End: 2020-03-13 | Stop reason: CLARIF

## 2020-03-13 RX ORDER — IOPAMIDOL 408 MG/ML
50 INJECTION, SOLUTION INTRAVASCULAR ONCE
Status: COMPLETED | OUTPATIENT
Start: 2020-03-13 | End: 2020-03-13

## 2020-03-13 RX ORDER — LIDOCAINE HYDROCHLORIDE 10 MG/ML
5 INJECTION, SOLUTION EPIDURAL; INFILTRATION; INTRACAUDAL; PERINEURAL ONCE
Status: COMPLETED | OUTPATIENT
Start: 2020-03-13 | End: 2020-03-13

## 2020-03-13 RX ORDER — TRIAMCINOLONE ACETONIDE 40 MG/ML
40 INJECTION, SUSPENSION INTRA-ARTICULAR; INTRAMUSCULAR ONCE
Status: COMPLETED | OUTPATIENT
Start: 2020-03-13 | End: 2020-03-13

## 2020-03-13 RX ADMIN — IOPAMIDOL 2 ML: 408 INJECTION, SOLUTION INTRAVASCULAR at 14:03

## 2020-03-13 RX ADMIN — TRIAMCINOLONE ACETONIDE 40 MG: 40 INJECTION, SUSPENSION INTRA-ARTICULAR; INTRAMUSCULAR at 14:03

## 2020-03-13 RX ADMIN — LIDOCAINE HYDROCHLORIDE 5 ML: 10 INJECTION, SOLUTION EPIDURAL; INFILTRATION; INTRACAUDAL; PERINEURAL at 14:01

## 2020-03-24 DIAGNOSIS — I10 BENIGN ESSENTIAL HYPERTENSION: ICD-10-CM

## 2020-03-24 LAB
ANION GAP SERPL CALCULATED.3IONS-SCNC: 4 MMOL/L (ref 3–14)
BUN SERPL-MCNC: 12 MG/DL (ref 7–30)
CALCIUM SERPL-MCNC: 8.6 MG/DL (ref 8.5–10.1)
CHLORIDE SERPL-SCNC: 105 MMOL/L (ref 94–109)
CO2 SERPL-SCNC: 30 MMOL/L (ref 20–32)
CREAT SERPL-MCNC: 0.86 MG/DL (ref 0.66–1.25)
GFR SERPL CREATININE-BSD FRML MDRD: >90 ML/MIN/{1.73_M2}
GLUCOSE SERPL-MCNC: 125 MG/DL (ref 70–99)
POTASSIUM SERPL-SCNC: 3.6 MMOL/L (ref 3.4–5.3)
SODIUM SERPL-SCNC: 139 MMOL/L (ref 133–144)

## 2020-03-24 PROCEDURE — 80048 BASIC METABOLIC PNL TOTAL CA: CPT | Performed by: FAMILY MEDICINE

## 2020-03-24 PROCEDURE — 36415 COLL VENOUS BLD VENIPUNCTURE: CPT | Performed by: FAMILY MEDICINE

## 2020-03-26 DIAGNOSIS — R73.01 ELEVATED FASTING GLUCOSE: Primary | ICD-10-CM

## 2020-03-26 NOTE — RESULT ENCOUNTER NOTE
Da,  Your results show that your blood sugar is elevated and you need secondary screening for diabetes.  I placed the order and you should come in for lab for sometime.  No rush.  Wait for the COVID-19 situation to settle down some.  Please let me know if you have any questions.    Sincerely,  Dr. Benavides

## 2020-12-27 DIAGNOSIS — I10 BENIGN ESSENTIAL HYPERTENSION: ICD-10-CM

## 2020-12-29 RX ORDER — LOSARTAN POTASSIUM 100 MG/1
100 TABLET ORAL DAILY
Qty: 90 TABLET | Refills: 0 | Status: SHIPPED | OUTPATIENT
Start: 2020-12-29 | End: 2021-01-19

## 2020-12-29 NOTE — TELEPHONE ENCOUNTER
Routing to team to set up F2F appointment for patient for yearly and med check.  Patient has been given #90 to get to appointment per triage protocol.  Rasheeda Velasco, ALMAZN, RN

## 2021-01-15 ENCOUNTER — HEALTH MAINTENANCE LETTER (OUTPATIENT)
Age: 56
End: 2021-01-15

## 2021-01-19 ENCOUNTER — OFFICE VISIT (OUTPATIENT)
Dept: FAMILY MEDICINE | Facility: CLINIC | Age: 56
End: 2021-01-19
Payer: COMMERCIAL

## 2021-01-19 VITALS
BODY MASS INDEX: 35.32 KG/M2 | RESPIRATION RATE: 20 BRPM | HEIGHT: 65 IN | DIASTOLIC BLOOD PRESSURE: 72 MMHG | OXYGEN SATURATION: 98 % | HEART RATE: 72 BPM | WEIGHT: 212 LBS | SYSTOLIC BLOOD PRESSURE: 128 MMHG | TEMPERATURE: 98 F

## 2021-01-19 DIAGNOSIS — Z00.00 ROUTINE GENERAL MEDICAL EXAMINATION AT A HEALTH CARE FACILITY: Primary | ICD-10-CM

## 2021-01-19 DIAGNOSIS — Z11.4 ENCOUNTER FOR SCREENING FOR HUMAN IMMUNODEFICIENCY VIRUS (HIV): ICD-10-CM

## 2021-01-19 DIAGNOSIS — E66.01 SEVERE OBESITY (BMI 35.0-39.9) WITH COMORBIDITY (H): ICD-10-CM

## 2021-01-19 DIAGNOSIS — I10 BENIGN ESSENTIAL HYPERTENSION: ICD-10-CM

## 2021-01-19 DIAGNOSIS — Z11.59 NEED FOR HEPATITIS C SCREENING TEST: ICD-10-CM

## 2021-01-19 DIAGNOSIS — Z23 NEED FOR VACCINATION: ICD-10-CM

## 2021-01-19 LAB
ANION GAP SERPL CALCULATED.3IONS-SCNC: 4 MMOL/L (ref 3–14)
BUN SERPL-MCNC: 15 MG/DL (ref 7–30)
CALCIUM SERPL-MCNC: 9 MG/DL (ref 8.5–10.1)
CHLORIDE SERPL-SCNC: 108 MMOL/L (ref 94–109)
CHOLEST SERPL-MCNC: 199 MG/DL
CO2 SERPL-SCNC: 28 MMOL/L (ref 20–32)
CREAT SERPL-MCNC: 0.89 MG/DL (ref 0.66–1.25)
CREAT UR-MCNC: 302 MG/DL
GFR SERPL CREATININE-BSD FRML MDRD: >90 ML/MIN/{1.73_M2}
GLUCOSE SERPL-MCNC: 129 MG/DL (ref 70–99)
HDLC SERPL-MCNC: 34 MG/DL
LDLC SERPL CALC-MCNC: 92 MG/DL
MICROALBUMIN UR-MCNC: 28 MG/L
MICROALBUMIN/CREAT UR: 9.27 MG/G CR (ref 0–17)
NONHDLC SERPL-MCNC: 165 MG/DL
POTASSIUM SERPL-SCNC: 3.9 MMOL/L (ref 3.4–5.3)
SODIUM SERPL-SCNC: 140 MMOL/L (ref 133–144)
TRIGL SERPL-MCNC: 365 MG/DL

## 2021-01-19 PROCEDURE — 82043 UR ALBUMIN QUANTITATIVE: CPT | Performed by: FAMILY MEDICINE

## 2021-01-19 PROCEDURE — 99396 PREV VISIT EST AGE 40-64: CPT | Mod: 25 | Performed by: FAMILY MEDICINE

## 2021-01-19 PROCEDURE — 80048 BASIC METABOLIC PNL TOTAL CA: CPT | Performed by: FAMILY MEDICINE

## 2021-01-19 PROCEDURE — 36415 COLL VENOUS BLD VENIPUNCTURE: CPT | Performed by: FAMILY MEDICINE

## 2021-01-19 PROCEDURE — 90471 IMMUNIZATION ADMIN: CPT | Performed by: FAMILY MEDICINE

## 2021-01-19 PROCEDURE — 80061 LIPID PANEL: CPT | Performed by: FAMILY MEDICINE

## 2021-01-19 PROCEDURE — 90750 HZV VACC RECOMBINANT IM: CPT | Performed by: FAMILY MEDICINE

## 2021-01-19 PROCEDURE — 86803 HEPATITIS C AB TEST: CPT | Performed by: FAMILY MEDICINE

## 2021-01-19 PROCEDURE — 87389 HIV-1 AG W/HIV-1&-2 AB AG IA: CPT | Performed by: FAMILY MEDICINE

## 2021-01-19 RX ORDER — LOSARTAN POTASSIUM 100 MG/1
100 TABLET ORAL DAILY
Qty: 90 TABLET | Refills: 4 | Status: SHIPPED | OUTPATIENT
Start: 2021-01-19 | End: 2022-03-07

## 2021-01-19 SDOH — HEALTH STABILITY: MENTAL HEALTH: HOW MANY STANDARD DRINKS CONTAINING ALCOHOL DO YOU HAVE ON A TYPICAL DAY?: 1 OR 2

## 2021-01-19 SDOH — HEALTH STABILITY: MENTAL HEALTH: HOW OFTEN DO YOU HAVE 6 OR MORE DRINKS ON ONE OCCASION?: NOT ASKED

## 2021-01-19 SDOH — HEALTH STABILITY: MENTAL HEALTH: HOW OFTEN DO YOU HAVE A DRINK CONTAINING ALCOHOL?: MONTHLY OR LESS

## 2021-01-19 ASSESSMENT — ENCOUNTER SYMPTOMS
SORE THROAT: 0
NERVOUS/ANXIOUS: 0
PARESTHESIAS: 0
DYSURIA: 0
HEARTBURN: 0
HEMATOCHEZIA: 0
EYE PAIN: 0
PALPITATIONS: 0
HEMATURIA: 0
NAUSEA: 0
SHORTNESS OF BREATH: 0
HEADACHES: 0
CONSTIPATION: 0
MYALGIAS: 0
FREQUENCY: 0
DIZZINESS: 0
COUGH: 0
JOINT SWELLING: 0
FEVER: 0
ABDOMINAL PAIN: 0
DIARRHEA: 0
ARTHRALGIAS: 0
CHILLS: 0

## 2021-01-19 ASSESSMENT — PAIN SCALES - GENERAL: PAINLEVEL: NO PAIN (0)

## 2021-01-19 ASSESSMENT — MIFFLIN-ST. JEOR: SCORE: 1733.51

## 2021-01-19 NOTE — PROGRESS NOTES
SUBJECTIVE:   CC: Da Akins is an 55 year old male who presents for preventative health visit.       Patient has been advised of split billing requirements and indicates understanding: Yes  Healthy Habits:     Getting at least 3 servings of Calcium per day:  NO    Bi-annual eye exam:  Yes    Dental care twice a year:  Yes    Sleep apnea or symptoms of sleep apnea:  None    Diet:  Regular (no restrictions)    Frequency of exercise:  None    Taking medications regularly:  Yes    Medication side effects:  None    PHQ-2 Total Score: 0    Additional concerns today:  No              Today's PHQ-2 Score:   PHQ-2 ( 1999 Pfizer) 1/19/2021   Q1: Little interest or pleasure in doing things 0   Q2: Feeling down, depressed or hopeless 0   PHQ-2 Score 0   Q1: Little interest or pleasure in doing things Not at all   Q2: Feeling down, depressed or hopeless Not at all   PHQ-2 Score 0       Abuse: Current or Past(Physical, Sexual or Emotional)- No  Do you feel safe in your environment? Yes        Social History     Tobacco Use     Smoking status: Never Smoker     Smokeless tobacco: Never Used   Substance Use Topics     Alcohol use: Yes     Frequency: Monthly or less     Drinks per session: 1 or 2         Alcohol Use 1/19/2021   Prescreen: >3 drinks/day or >7 drinks/week? No   Prescreen: >3 drinks/day or >7 drinks/week? -       Last PSA:   PSA   Date Value Ref Range Status   10/11/2017 2.68 0 - 4 ug/L Final     Comment:     Assay Method:  Chemiluminescence using Siemens Vista analyzer       Reviewed orders with patient. Reviewed health maintenance and updated orders accordingly - Yes      Reviewed and updated as needed this visit by clinical staff  Tobacco  Allergies  Meds  Problems  Med Hx  Surg Hx  Fam Hx  Soc Hx          Reviewed and updated as needed this visit by Provider  Tobacco  Allergies  Meds  Problems  Med Hx  Surg Hx  Fam Hx             Review of Systems   Constitutional: Negative for chills and fever.  "  HENT: Negative for congestion, ear pain, hearing loss and sore throat.    Eyes: Negative for pain and visual disturbance.   Respiratory: Negative for cough and shortness of breath.    Cardiovascular: Negative for chest pain, palpitations and peripheral edema.   Gastrointestinal: Negative for abdominal pain, constipation, diarrhea, heartburn, hematochezia and nausea.   Genitourinary: Negative for discharge, dysuria, frequency, genital sores, hematuria, impotence and urgency.   Musculoskeletal: Negative for arthralgias, joint swelling and myalgias.   Skin: Negative for rash.   Neurological: Negative for dizziness, headaches and paresthesias.   Psychiatric/Behavioral: Negative for mood changes. The patient is not nervous/anxious.          OBJECTIVE:   /72   Pulse 72   Temp 98  F (36.7  C) (Temporal)   Resp 20   Ht 1.651 m (5' 5\")   Wt 96.2 kg (212 lb)   SpO2 98%   BMI 35.28 kg/m      Physical Exam  Constitutional:       General: He is not in acute distress.     Appearance: He is well-developed.   HENT:      Head: Normocephalic and atraumatic.      Right Ear: Hearing, tympanic membrane, ear canal and external ear normal.      Left Ear: Hearing, tympanic membrane, ear canal and external ear normal.      Nose: Nose normal.      Mouth/Throat:      Mouth: No oral lesions.      Pharynx: Uvula midline. No oropharyngeal exudate.   Eyes:      General: Lids are normal. No scleral icterus.        Right eye: No discharge.         Left eye: No discharge.      Conjunctiva/sclera: Conjunctivae normal.      Pupils: Pupils are equal, round, and reactive to light.   Neck:      Musculoskeletal: Normal range of motion and neck supple.      Thyroid: No thyroid mass or thyromegaly.      Trachea: No tracheal deviation.   Cardiovascular:      Rate and Rhythm: Normal rate and regular rhythm.      Pulses: Normal pulses.      Heart sounds: Normal heart sounds, S1 normal and S2 normal. No murmur. No S3 or S4 sounds.    Pulmonary: "      Effort: Pulmonary effort is normal. No respiratory distress.      Breath sounds: Normal breath sounds. No wheezing or rales.   Abdominal:      General: Bowel sounds are normal. There is no distension.      Palpations: Abdomen is soft. There is no mass.      Tenderness: There is no abdominal tenderness. There is no guarding.   Musculoskeletal: Normal range of motion.         General: No deformity.   Lymphadenopathy:      Cervical: No cervical adenopathy.      Upper Body:      Right upper body: No supraclavicular adenopathy.      Left upper body: No supraclavicular adenopathy.   Skin:     General: Skin is warm and dry.      Findings: No lesion or rash.   Neurological:      Mental Status: He is alert and oriented to person, place, and time.      Motor: No abnormal muscle tone.      Deep Tendon Reflexes: Reflexes are normal and symmetric.   Psychiatric:         Speech: Speech normal.         Thought Content: Thought content normal.         Judgment: Judgment normal.             ASSESSMENT/PLAN:     ASSESSMENT/ORDERS:    ICD-10-CM    1. Routine general medical examination at a health care facility  Z00.00 Lipid panel reflex to direct LDL Fasting   2. Severe obesity (BMI 35.0-39.9) with comorbidity (H)  E66.01    3. Benign essential hypertension  I10 losartan (COZAAR) 100 MG tablet     Lipid panel reflex to direct LDL Fasting     Albumin Random Urine Quantitative with Creat Ratio     Basic metabolic panel   4. Need for hepatitis C screening test  Z11.59 Hepatitis C Screen Reflex to HCV RNA Quant and Genotype   5. Encounter for screening for human immunodeficiency virus (HIV)  Z11.4 HIV Antigen Antibody Combo   6. Need for vaccination  Z23 SHINGRIX [[id number]]     PLAN:  1.   Medications refilled for all other above noted stable conditions.  Labs ordered as noted above.      Patient has been advised of split billing requirements and indicates understanding: Yes  COUNSELING:   Reviewed preventive health counseling, as  "reflected in patient instructions    Estimated body mass index is 35.28 kg/m  as calculated from the following:    Height as of this encounter: 1.651 m (5' 5\").    Weight as of this encounter: 96.2 kg (212 lb).     Weight management plan: Discussed healthy diet and exercise guidelines reviewed importance of aerobic exercise in setting of hypertension and improving management of this and prevention of worsening cardiovascular outcomes.      He reports that he has never smoked. He has never used smokeless tobacco.      Counseling Resources:  ATP IV Guidelines  Pooled Cohorts Equation Calculator  FRAX Risk Assessment  ICSI Preventive Guidelines  Dietary Guidelines for Americans, 2010  USDA's MyPlate  ASA Prophylaxis  Lung CA Screening    Minh Benavides MD  [facility]  "

## 2021-01-20 LAB
HCV AB SERPL QL IA: NONREACTIVE
HIV 1+2 AB+HIV1 P24 AG SERPL QL IA: NONREACTIVE

## 2021-01-21 DIAGNOSIS — R73.01 ELEVATED FASTING BLOOD SUGAR: Primary | ICD-10-CM

## 2021-01-22 NOTE — RESULT ENCOUNTER NOTE
Da,  Your results show elevated blood sugar.  I placed an order for you to come and get a confirmatory test to see if you have diabetes.  You will need to make a lab appointment to do this.  No need to fast.  The rest of your labs are fine.  Please let me know if you have any questions.    Sincerely,  Dr. Benavides

## 2021-03-26 ENCOUNTER — ALLIED HEALTH/NURSE VISIT (OUTPATIENT)
Dept: FAMILY MEDICINE | Facility: CLINIC | Age: 56
End: 2021-03-26
Payer: COMMERCIAL

## 2021-03-26 DIAGNOSIS — Z23 NEED FOR VACCINATION: Primary | ICD-10-CM

## 2021-03-26 PROCEDURE — 90471 IMMUNIZATION ADMIN: CPT

## 2021-03-26 PROCEDURE — 90750 HZV VACC RECOMBINANT IM: CPT

## 2021-03-26 PROCEDURE — 99207 PR NO CHARGE NURSE ONLY: CPT

## 2021-03-26 NOTE — NURSING NOTE
Prior to immunization administration, verified patients identity using patient s name and date of birth. Please see Immunization Activity for additional information.     Screening Questionnaire for Adult Immunization    Are you sick today?   No   Do you have allergies to medications, food, a vaccine component or latex?   No   Have you ever had a serious reaction after receiving a vaccination?   No   Do you have a long-term health problem with heart, lung, kidney, or metabolic disease (e.g., diabetes), asthma, a blood disorder, no spleen, complement component deficiency, a cochlear implant, or a spinal fluid leak?  Are you on long-term aspirin therapy?   No   Do you have cancer, leukemia, HIV/AIDS, or any other immune system problem?   No   Do you have a parent, brother, or sister with an immune system problem?   No   In the past 3 months, have you taken medications that affect  your immune system, such as prednisone, other steroids, or anticancer drugs; drugs for the treatment of rheumatoid arthritis, Crohn s disease, or psoriasis; or have you had radiation treatments?   No   Have you had a seizure, or a brain or other nervous system problem?   No   During the past year, have you received a transfusion of blood or blood    products, or been given immune (gamma) globulin or antiviral drug?   No   For women: Are you pregnant or is there a chance you could become       pregnant during the next month?   No   Have you received any vaccinations in the past 4 weeks?   No     Immunization questionnaire answers were all negative.        Per orders of Dr. Benavides, injection of Shigles given by Melvi Randle MA. Patient instructed to remain in clinic for 15 minutes afterwards, and to report any adverse reaction to me immediately.       Screening performed by Melvi Randle MA on 3/26/2021 at 3:34 PM.

## 2021-07-29 ENCOUNTER — VIRTUAL VISIT (OUTPATIENT)
Dept: FAMILY MEDICINE | Facility: OTHER | Age: 56
End: 2021-07-29
Payer: COMMERCIAL

## 2021-07-29 DIAGNOSIS — L23.7 CONTACT DERMATITIS DUE TO POISON IVY: Primary | ICD-10-CM

## 2021-07-29 DIAGNOSIS — B35.6 TINEA CRURIS: ICD-10-CM

## 2021-07-29 PROCEDURE — 99213 OFFICE O/P EST LOW 20 MIN: CPT | Mod: TEL | Performed by: FAMILY MEDICINE

## 2021-07-29 RX ORDER — PREDNISONE 20 MG/1
TABLET ORAL
Qty: 20 TABLET | Refills: 0 | Status: SHIPPED | OUTPATIENT
Start: 2021-07-29 | End: 2022-05-27

## 2021-07-29 RX ORDER — FAMOTIDINE 40 MG/1
40 TABLET, FILM COATED ORAL AT BEDTIME
COMMUNITY
Start: 2021-06-12 | End: 2022-05-27

## 2021-07-29 RX ORDER — KETOCONAZOLE 20 MG/G
CREAM TOPICAL DAILY
Qty: 60 G | Refills: 3 | Status: SHIPPED | OUTPATIENT
Start: 2021-07-29 | End: 2023-07-20

## 2021-07-29 RX ORDER — TRIAMCINOLONE ACETONIDE 5 MG/G
OINTMENT TOPICAL
COMMUNITY
Start: 2021-06-12

## 2021-07-29 RX ORDER — TRIAMCINOLONE ACETONIDE 5 MG/G
CREAM TOPICAL 2 TIMES DAILY
Qty: 15 G | Refills: 1 | Status: SHIPPED | OUTPATIENT
Start: 2021-07-29 | End: 2021-08-10

## 2021-07-29 RX ORDER — PREDNISONE 20 MG/1
20 TABLET ORAL DAILY
COMMUNITY
Start: 2021-06-12 | End: 2022-05-27

## 2021-07-29 RX ORDER — DIPHENHYDRAMINE HYDROCHLORIDE 25 MG/1
25 CAPSULE ORAL
COMMUNITY
Start: 2021-06-12 | End: 2022-05-27

## 2021-07-29 ASSESSMENT — PAIN SCALES - GENERAL: PAINLEVEL: NO PAIN (0)

## 2021-07-29 NOTE — PROGRESS NOTES
Da is a 56 year old who is being evaluated via a billable telephone visit.      What phone number would you like to be contacted at? [phone]  How would you like to obtain your AVS? MyChart    Assessment & Plan     Contact dermatitis due to poison ivy  Steroids as prescribed for contact dermatitis    - predniSONE (DELTASONE) 20 MG tablet; Take 3 tabs by mouth daily x 3 days, then 2 tabs daily x 3 days, then 1 tab daily x 3 days, then 1/2 tab daily x 3 days.  - triamcinolone (ARISTOCORT HP) 0.5 % external cream; Apply topically 2 times daily for 12 days    Tinea Cruris  - ketoconazole (NIZORAL) 2 % external cream; Apply topically daily        No follow-ups on file.    Anaid Beck MD  [facility]   Da is a 56 year old who presents for the following health issues     HPI     Rash  Onset/Duration: 2 weeks   Description  Location: right foot  Character: round, raised, draining, red  Itching: moderate  Intensity:  moderate  Progression of Symptoms:  worsening  Accompanying signs and symptoms:   Fever: no  Body aches or joint pain: no  Sore throat symptoms: no  Recent cold symptoms: no  History:           Previous episodes of similar rash: None  New exposures:  grasses  Recent travel: no  Exposure to similar rash: no  Precipitating or alleviating factors: none  Therapies tried and outcome: none        Review of Systems   Constitutional, HEENT, cardiovascular, pulmonary, gi and gu systems are negative, except as otherwise noted.      Objective    Vitals - Patient Reported  Pain Score: No Pain (0)        Physical Exam   healthy, alert and no distress  PSYCH: Alert and oriented times 3; coherent speech, normal   rate and volume, able to articulate logical thoughts, able   to abstract reason, no tangential thoughts, no hallucinations   or delusions  His affect is normal  RESP: No cough, no audible wheezing, able to talk in full sentences  Remainder of exam unable to be  completed due to telephone visits                Phone call duration: 10 minutes

## 2021-10-24 ENCOUNTER — HEALTH MAINTENANCE LETTER (OUTPATIENT)
Age: 56
End: 2021-10-24

## 2022-03-05 DIAGNOSIS — I10 BENIGN ESSENTIAL HYPERTENSION: ICD-10-CM

## 2022-03-07 RX ORDER — LOSARTAN POTASSIUM 100 MG/1
TABLET ORAL
Qty: 90 TABLET | Refills: 0 | Status: SHIPPED | OUTPATIENT
Start: 2022-03-07 | End: 2022-05-27

## 2022-03-07 NOTE — TELEPHONE ENCOUNTER
Pending Prescriptions:                       Disp   Refills    losartan (COZAAR) 100 MG tablet [Pharmacy *90 tab*4        Sig: TAKE 1 TABLET BY MOUTH EVERY DAY    Routing refill request to provider for review/approval because:  Labs not current:  CR, Potassium  Patient needs to be seen because it has been more than 1 year since last office visit.  BP failed RN protocol    Sending to scheduling for yearly office visit due    Yelena Puckett RN

## 2022-03-07 NOTE — TELEPHONE ENCOUNTER
Medication refilled x1.  Needs appointment for further refills.  Will have staff notify patient.    Minh Benavides MD

## 2022-04-10 ENCOUNTER — HEALTH MAINTENANCE LETTER (OUTPATIENT)
Age: 57
End: 2022-04-10

## 2022-05-27 ENCOUNTER — OFFICE VISIT (OUTPATIENT)
Dept: FAMILY MEDICINE | Facility: CLINIC | Age: 57
End: 2022-05-27
Payer: MEDICAID

## 2022-05-27 ENCOUNTER — HOSPITAL ENCOUNTER (OUTPATIENT)
Dept: GENERAL RADIOLOGY | Facility: CLINIC | Age: 57
Discharge: HOME | End: 2022-05-27
Attending: FAMILY MEDICINE | Admitting: FAMILY MEDICINE
Payer: MEDICAID

## 2022-05-27 VITALS
HEART RATE: 93 BPM | WEIGHT: 195.13 LBS | TEMPERATURE: 97.5 F | BODY MASS INDEX: 31.36 KG/M2 | HEIGHT: 66 IN | SYSTOLIC BLOOD PRESSURE: 156 MMHG | OXYGEN SATURATION: 98 % | DIASTOLIC BLOOD PRESSURE: 92 MMHG

## 2022-05-27 DIAGNOSIS — M25.551 HIP PAIN, RIGHT: ICD-10-CM

## 2022-05-27 DIAGNOSIS — I10 BENIGN ESSENTIAL HYPERTENSION: Primary | ICD-10-CM

## 2022-05-27 DIAGNOSIS — R73.01 ELEVATED FASTING BLOOD SUGAR: ICD-10-CM

## 2022-05-27 PROBLEM — E66.01 SEVERE OBESITY (BMI 35.0-39.9) WITH COMORBIDITY (H): Status: RESOLVED | Noted: 2020-03-10 | Resolved: 2022-05-27

## 2022-05-27 LAB
ANION GAP SERPL CALCULATED.3IONS-SCNC: 4 MMOL/L (ref 3–14)
BUN SERPL-MCNC: 13 MG/DL (ref 7–30)
CALCIUM SERPL-MCNC: 8.9 MG/DL (ref 8.5–10.1)
CHLORIDE BLD-SCNC: 107 MMOL/L (ref 94–109)
CHOLEST SERPL-MCNC: 223 MG/DL
CO2 SERPL-SCNC: 28 MMOL/L (ref 20–32)
CREAT SERPL-MCNC: 0.85 MG/DL (ref 0.66–1.25)
CREAT UR-MCNC: 169 MG/DL
FASTING STATUS PATIENT QL REPORTED: YES
GFR SERPL CREATININE-BSD FRML MDRD: >90 ML/MIN/1.73M2
GLUCOSE BLD-MCNC: 125 MG/DL (ref 70–99)
HDLC SERPL-MCNC: 40 MG/DL
LDLC SERPL CALC-MCNC: 118 MG/DL
LDLC SERPL CALC-MCNC: ABNORMAL MG/DL
MICROALBUMIN UR-MCNC: 14 MG/L
MICROALBUMIN/CREAT UR: 8.28 MG/G CR (ref 0–17)
NONHDLC SERPL-MCNC: 183 MG/DL
POTASSIUM BLD-SCNC: 4 MMOL/L (ref 3.4–5.3)
SODIUM SERPL-SCNC: 139 MMOL/L (ref 133–144)
TRIGL SERPL-MCNC: 470 MG/DL

## 2022-05-27 PROCEDURE — 99214 OFFICE O/P EST MOD 30 MIN: CPT | Performed by: FAMILY MEDICINE

## 2022-05-27 PROCEDURE — 80048 BASIC METABOLIC PNL TOTAL CA: CPT | Performed by: FAMILY MEDICINE

## 2022-05-27 PROCEDURE — 73502 X-RAY EXAM HIP UNI 2-3 VIEWS: CPT

## 2022-05-27 PROCEDURE — 80061 LIPID PANEL: CPT | Performed by: FAMILY MEDICINE

## 2022-05-27 PROCEDURE — 82043 UR ALBUMIN QUANTITATIVE: CPT | Performed by: FAMILY MEDICINE

## 2022-05-27 PROCEDURE — 36415 COLL VENOUS BLD VENIPUNCTURE: CPT | Performed by: FAMILY MEDICINE

## 2022-05-27 RX ORDER — LOSARTAN POTASSIUM 100 MG/1
100 TABLET ORAL DAILY
Qty: 90 TABLET | Refills: 4 | Status: SHIPPED | OUTPATIENT
Start: 2022-05-27 | End: 2022-06-23

## 2022-05-27 ASSESSMENT — PAIN SCALES - GENERAL: PAINLEVEL: NO PAIN (0)

## 2022-05-27 NOTE — PROGRESS NOTES
"  Assessment & Plan     ASSESSMENT/ORDERS:    ICD-10-CM    1. Benign essential hypertension  I10 losartan (COZAAR) 100 MG tablet     Basic metabolic panel     Albumin Random Urine Quantitative with Creat Ratio     Lipid panel reflex to direct LDL Fasting     Basic metabolic panel     Albumin Random Urine Quantitative with Creat Ratio     Lipid panel reflex to direct LDL Fasting     LDL cholesterol direct   2. Hip pain, right  M25.551 XR Hip Right 2-3 Views     Physical Therapy Referral   3. Elevated fasting blood sugar  R73.01      PLAN:  1.  Hypertension not at goal.  Will have him come back for nurse visit in 2 weeks.  If blood pressure still elevated, would recommend starting him on 5 mg amlodipine and then having him follow-up with me 2 weeks later for hypertension recheck.  2.  Physical therapy referral and hip x-ray today for further evaluation of his right hip pain.  He is quite inflexible which may be what is contributing to his pain.  3.  Needs glucose check for high blood sugar.  If at or above 125, will have him get hemoglobin A1c in 2 weeks.              BMI:   Estimated body mass index is 31.49 kg/m  as calculated from the following:    Height as of this encounter: 1.676 m (5' 6\").    Weight as of this encounter: 88.5 kg (195 lb 2 oz).           No follow-ups on file.    Minh Benavides MD  [facility] is a 57 year old who presents for the following health issues     History of Present Illness       Hypertension: He presents for follow up of hypertension.  He does not check blood pressure  regularly outside of the clinic. Outpatient blood pressures have not been over 140/90. He does not follow a low salt diet.         Hypertension Follow-up      Do you check your blood pressure regularly outside of the clinic? Yes     Are you following a low salt diet? No    Are your blood pressures ever more than 140 on the top number (systolic) OR more   than 90 on " "the bottom number (diastolic), for example 140/90? No      How many servings of fruits and vegetables do you eat daily?  0-1    On average, how many sweetened beverages do you drink each day (Examples: soda, juice, sweet tea, etc.  Do NOT count diet or artificially sweetened beverages)?   0    How many days per week do you exercise enough to make your heart beat faster? 3 or less    How many minutes a day do you exercise enough to make your heart beat faster? 10 - 19    How many days per week do you miss taking your medication? 0      Blood pressure not at goal today.  Has been at goal in past with same dose of losartan.  Has been taking it daily.  Not missing doses.    Blood sugar 129 last year, did not get follow-up hemoglobin A1c.    Having right hip pain.  Hurts deep in his hip radiates down to lateral knee.  Pain with ambulation and still when getting up.  Has had hip intraarticular injections in the past that were helpful one time and the following time was not as helpful and did not seem to last very long.  He wants to know what his options are.    Review of Systems         Objective    BP (!) 160/90   Pulse 93   Temp 97.5  F (36.4  C) (Temporal)   Ht 1.676 m (5' 6\")   Wt 88.5 kg (195 lb 2 oz)   SpO2 98%   BMI 31.49 kg/m    Body mass index is 31.49 kg/m .  Physical Exam  Constitutional:       Appearance: He is well-developed.   Cardiovascular:      Rate and Rhythm: Normal rate and regular rhythm.      Heart sounds: Normal heart sounds, S1 normal and S2 normal. No murmur heard.  Pulmonary:      Effort: Pulmonary effort is normal. No respiratory distress.      Breath sounds: Normal breath sounds. No wheezing, rhonchi or rales.   Musculoskeletal:      Comments: Normal internal and external passive range of motion of bilateral hips, though both legs are notably stiff and he has tight hamstrings when doing straight leg raise (negative exam).  Is unable to do ALEX testing due to muscle stiffness.  Normal hip " flexion with 5/5 strength.   Neurological:      Mental Status: He is alert.

## 2022-05-31 NOTE — RESULT ENCOUNTER NOTE
Da,  Your results show you have some progression in the arthritis in your right hip.  Physical therapy can still provide you with benefit for your pain management and would be worth trying.  You can also go and see an orthopedic surgeon for additional information on treatment options including considering another hip injection. Please let me know if you have any questions or wish to have a referral to orthopedic surgery.    Sincerely,  Dr. Benavides

## 2022-05-31 NOTE — RESULT ENCOUNTER NOTE
Da,  Your results show your triglycerides are elevated high enough that you may want to consider being on a cholesterol medication to reduce your risk of getting pancreatitis, a painful inflammatory condition of your pancreas that is linked to elevated triglycerides.  Your blood sugar is elevated again and is high enough that you should get a secondary screening test for diabetes when you come back for your blood pressure recheck with the nurse visit next time.  I am sending a note to my staff to add that lab appointment on to your visit that day.  Please let me know if you have any questions.    Sincerely,  Dr. Benavides

## 2022-06-01 ENCOUNTER — MYC MEDICAL ADVICE (OUTPATIENT)
Dept: FAMILY MEDICINE | Facility: CLINIC | Age: 57
End: 2022-06-01
Payer: MEDICAID

## 2022-06-01 ENCOUNTER — HOSPITAL ENCOUNTER (OUTPATIENT)
Dept: PHYSICAL THERAPY | Facility: CLINIC | Age: 57
Setting detail: THERAPIES SERIES
Discharge: HOME | End: 2022-06-01
Attending: FAMILY MEDICINE
Payer: MEDICAID

## 2022-06-01 DIAGNOSIS — M25.551 HIP PAIN, RIGHT: ICD-10-CM

## 2022-06-01 DIAGNOSIS — M25.551 HIP PAIN, RIGHT: Primary | ICD-10-CM

## 2022-06-01 PROCEDURE — 97110 THERAPEUTIC EXERCISES: CPT | Mod: GP | Performed by: PHYSICAL THERAPIST

## 2022-06-01 PROCEDURE — 97161 PT EVAL LOW COMPLEX 20 MIN: CPT | Mod: GP | Performed by: PHYSICAL THERAPIST

## 2022-06-01 NOTE — PROGRESS NOTES
06/01/22 1429   General Information   Type of Visit Initial OP Ortho PT Evaluation   Start of Care Date 06/01/22   Referring Physician tonya sargent MD   Patient/Family Goals Statement right hip pain   Orders Evaluate and Treat   Date of Order 04/27/22   Certification Required? No   Medical Diagnosis right hip pain M25.551   Surgical/Medical history reviewed Yes   Precautions/Limitations no known precautions/limitations   Body Part(s)   Body Part(s) Hip   Presentation and Etiology   Pertinent history of current problem (include personal factors and/or comorbidities that impact the POC) patient reports that he has had right laterial and groin pain progressively last 5 years has had 2 injections first really helped but last injection 2 years didnt help . PMH HTN meds works as    Impairments A. Pain;F. Decreased strength and endurance;H. Impaired gait;E. Decreased flexibility   Functional Limitations perform activities of daily living;perform required work activities;perform desired leisure / sports activities   Symptom Location rgith hip   Onset date of current episode/exacerbation 02/01/22   Chronicity Chronic   Pain rating (0-10 point scale) Best (/10);Worst (/10)   Best (/10) 0/10   Worst (/10) 6/10   Pain quality A. Sharp;B. Dull;C. Aching   Frequency of pain/symptoms C. With activity   Pain/symptoms are: Worse during the night   Pain/symptoms exacerbated by B. Walking  (sleeping on stomach)   Pain/symptoms eased by E. Changing positions   Progression of symptoms since onset: Unchanged   Current / Previous Interventions   Diagnostic Tests: X-ray   Prior Level of Function   Functional Level Prior Comment bike   Current Level of Function   Current Community Support Family/friend caregiver   Patient role/employment history A. Employed   Fall Risk Screen   Fall screen completed by PT   Have you fallen 2 or more times in the past year? No   Have you fallen and had an injury in the past year? No   Is  patient a fall risk? No   Abuse Screen (yes response referral indicated)   Feels Unsafe at Home or Work/School no   Feels Threatened by Someone no   Does Anyone Try to Keep You From Having Contact with Others or Doing Things Outside Your Home? no   Physical Signs of Abuse Present no   Hip Objective Findings   Gait/Locomotion antelgic right LE limp   Hip ROM Comments B limited internal rotation right worse than left to 0 , external rotation B 15 flexion right 100 left 115   Hip/Knee Strength Comments strength is grossly 4/5 limited by pain in right hip   Straight Leg Raise Test neg   Scour Test pain   ALEX Test pain   Planned Therapy Interventions   Planned Therapy Interventions ADL retraining;balance training;ROM;strengthening;stretching   Planned Modality Interventions   Planned Modality Interventions Comments modalities as needed   Clinical Impression   Criteria for Skilled Therapeutic Interventions Met yes, treatment indicated   PT Diagnosis B OA hips right > left   Functional limitations due to impairments LEFS 48   Clinical Presentation Stable/Uncomplicated   Clinical Presentation Rationale patient seen due to c/o right hip pain , stiff and weak, Rx is skilled PT instruction in exercises and HEP   Clinical Decision Making (Complexity) Low complexity   Risk & Benefits of therapy have been explained Yes   Patient, Family & other staff in agreement with plan of care Yes   Education Assessment   Preferred Learning Style Listening;Reading;Demonstration   Barriers to Learning No barriers   ORTHO GOALS   PT Ortho Eval Goals 1;2;3   Ortho Goal 1   Goal Identifier 1   Goal Description instruction in HEP and compliant with it 5 of 7 days to improve quality of life   Target Date 09/01/22   Ortho Goal 2   Goal Identifier 2   Goal Description patient to have reduction in pain level currently 0-6/10 goal is 0-3/10   Target Date 09/01/22   Ortho Goal 3   Goal Identifier 3   Goal Description patient to have improved tolerance  to activity currently LEFS 48 goal is 55   Target Date 09/01/22   Total Evaluation Time   PT Eval, Low Complexity Minutes ([id number]) 15

## 2022-06-01 NOTE — PROGRESS NOTES
06/01/22 1429   General Information   Type of Visit Initial OP Ortho PT Evaluation   Start of Care Date 06/01/22   Referring Physician tonya sargent MD   Patient/Family Goals Statement right hip pain   Orders Evaluate and Treat   Date of Order 04/27/22   Certification Required? No   Medical Diagnosis right hip pain M25.551   Surgical/Medical history reviewed Yes   Precautions/Limitations no known precautions/limitations   Body Part(s)   Body Part(s) Hip   Presentation and Etiology   Pertinent history of current problem (include personal factors and/or comorbidities that impact the POC) patient reports that he has had right laterial and groin pain progressively last 5 years has had 2 injections first really helped but last injection 2 years didnt help . PMH HTN meds works as    Impairments A. Pain;F. Decreased strength and endurance;H. Impaired gait;E. Decreased flexibility   Functional Limitations perform activities of daily living;perform required work activities;perform desired leisure / sports activities   Symptom Location rgith hip   Onset date of current episode/exacerbation 02/01/22   Chronicity Chronic   Pain rating (0-10 point scale) Best (/10);Worst (/10)   Best (/10) 0/10   Worst (/10) 6/10   Pain quality A. Sharp;B. Dull;C. Aching   Frequency of pain/symptoms C. With activity   Pain/symptoms are: Worse during the night   Pain/symptoms exacerbated by B. Walking  (sleeping on stomach)   Pain/symptoms eased by E. Changing positions   Progression of symptoms since onset: Unchanged   Current / Previous Interventions   Diagnostic Tests: X-ray   Prior Level of Function   Functional Level Prior Comment bike   Current Level of Function   Current Community Support Family/friend caregiver   Patient role/employment history A. Employed   Fall Risk Screen   Fall screen completed by PT   Have you fallen 2 or more times in the past year? No   Have you fallen and had an injury in the past year? No   Is  patient a fall risk? No   Abuse Screen (yes response referral indicated)   Feels Unsafe at Home or Work/School no   Feels Threatened by Someone no   Does Anyone Try to Keep You From Having Contact with Others or Doing Things Outside Your Home? no   Physical Signs of Abuse Present no   Hip Objective Findings   Gait/Locomotion antelgic right LE limp   Hip ROM Comments B limited internal rotation right worse than left to 0 , external rotation B 15 flexion right 100 left 115   Hip/Knee Strength Comments strength is grossly 4/5 limited by pain in right hip   Straight Leg Raise Test neg   Scour Test pain   ALEX Test pain   Planned Therapy Interventions   Planned Therapy Interventions ADL retraining;balance training;ROM;strengthening;stretching   Planned Modality Interventions   Planned Modality Interventions Comments modalities as needed   Clinical Impression   Criteria for Skilled Therapeutic Interventions Met yes, treatment indicated   PT Diagnosis B OA hips right > left   Functional limitations due to impairments LEFS 48   Clinical Presentation Stable/Uncomplicated   Clinical Presentation Rationale patient seen due to c/o right hip pain , stiff and weak, Rx is skilled PT instruction in exercises and HEP   Clinical Decision Making (Complexity) Low complexity   Predicted Duration of Therapy Intervention (days/wks) every other week to progress HEP x 1-3 months   Risk & Benefits of therapy have been explained Yes   Patient, Family & other staff in agreement with plan of care Yes   Education Assessment   Preferred Learning Style Listening;Reading;Demonstration   Barriers to Learning No barriers   ORTHO GOALS   PT Ortho Eval Goals 1;2;3   Ortho Goal 1   Goal Identifier 1   Goal Description instruction in HEP and compliant with it 5 of 7 days to improve quality of life   Target Date 09/01/22   Ortho Goal 2   Goal Identifier 2   Goal Description patient to have reduction in pain level currently 0-6/10 goal is 0-3/10   Target  Date 09/01/22   Ortho Goal 3   Goal Identifier 3   Goal Description patient to have improved tolerance to activity currently LEFS 48 goal is 55   Target Date 09/01/22   Total Evaluation Time   PT Eval, Low Complexity Minutes ([id number]) 15

## 2022-06-02 NOTE — TELEPHONE ENCOUNTER
Patient would like to move forward with orthopedic referral.     Referral pended for your review.     ALMAZ LombardoN, RN

## 2022-06-07 NOTE — PROGRESS NOTES
[facility]    OUTPATIENT PHYSICAL THERAPY ORTHOPEDIC EVALUATION  PLAN OF TREATMENT FOR OUTPATIENT REHABILITATION  (COMPLETE FOR INITIAL CLAIMS ONLY)  Patient's Last Name, First Name, M.I.  YOB: 1965  Da Akins    Provider s Name:  [facility]   Medical Record No.  [id number]   Start of Care Date:  06/01/22   Onset Date:  02/01/22   Type:     _X__PT   ___OT   ___SLP Medical Diagnosis:  right hip pain M25.551     PT Diagnosis:  B OA hips right > left   Visits from SOC:  1      _________________________________________________________________________________  Plan of Treatment/Functional Goals:  ADL retraining, balance training, ROM, strengthening, stretching        modalities as needed  Goals  Goal Identifier: 1  Goal Description: instruction in HEP and compliant with it 5 of 7 days to improve quality of life  Target Date: 09/01/22    Goal Identifier: 2  Goal Description: patient to have reduction in pain level currently 0-6/10 goal is 0-3/10  Target Date: 09/01/22    Goal Identifier: 3  Goal Description: patient to have improved tolerance to activity currently LEFS 48 goal is 55  Target Date: 09/01/22                                                           Therapy Frequency:     Predicted Duration of Therapy Intervention:  every other week to progress HEP x 1-3 months    Audelia Abrams, PT                 I CERTIFY THE NEED FOR THESE SERVICES FURNISHED UNDER        THIS PLAN OF TREATMENT AND WHILE UNDER MY CARE     (Physician co-signature of this document indicates review and certification of the therapy plan).                       Certification Date From:  06/01/22   Certification Date To:  09/01/22    Referring Provider:  tonya sargent MD    Initial Assessment        See Epic Evaluation Start of Care Date: 06/01/22

## 2022-06-09 ENCOUNTER — LAB (OUTPATIENT)
Dept: LAB | Facility: CLINIC | Age: 57
End: 2022-06-09
Payer: MEDICAID

## 2022-06-09 ENCOUNTER — ALLIED HEALTH/NURSE VISIT (OUTPATIENT)
Dept: FAMILY MEDICINE | Facility: CLINIC | Age: 57
End: 2022-06-09

## 2022-06-09 VITALS — HEART RATE: 72 BPM | SYSTOLIC BLOOD PRESSURE: 150 MMHG | DIASTOLIC BLOOD PRESSURE: 86 MMHG

## 2022-06-09 DIAGNOSIS — I10 PRIMARY HYPERTENSION: ICD-10-CM

## 2022-06-09 DIAGNOSIS — Z01.30 BLOOD PRESSURE CHECK: Primary | ICD-10-CM

## 2022-06-09 DIAGNOSIS — R73.01 ELEVATED FASTING BLOOD SUGAR: ICD-10-CM

## 2022-06-09 LAB — HBA1C MFR BLD: 6.4 % (ref 0–5.6)

## 2022-06-09 PROCEDURE — 99207 PR NO CHARGE NURSE ONLY: CPT

## 2022-06-09 PROCEDURE — 83036 HEMOGLOBIN GLYCOSYLATED A1C: CPT

## 2022-06-09 PROCEDURE — 36415 COLL VENOUS BLD VENIPUNCTURE: CPT

## 2022-06-09 RX ORDER — AMLODIPINE BESYLATE 5 MG/1
5 TABLET ORAL DAILY
Qty: 30 TABLET | Refills: 1 | Status: SHIPPED | OUTPATIENT
Start: 2022-06-09 | End: 2022-06-23

## 2022-06-09 NOTE — PROGRESS NOTES
I met with Da Akins at the request of Dr. Benavides to recheck his blood pressure.  Blood pressure medications on the med list were reviewed with patient.    Patient has taken all medications as per usual regimen: Yes  Patient reports tolerating them without any issues or concerns: Yes    Vitals:    06/09/22 1404   BP: (!) 156/94   BP Location: Left arm   Patient Position: Sitting   Cuff Size: Adult Large   Pulse: 62       After 5 minutes, the patient's blood pressure remained greater than or equal to 140/90.    Is the patient currently having any chest pain? No  Does the patient currently have a headache? No  Does the patient currently have any vision changes? No  Does the patient currently have any nausea? No  Does the patient currently have any abdominal pain? No    The previous encounter was reviewed.  The patient was discharged and the note will be sent to the provider for final review. Appointment was made with Dr. Benavides on 06/23/2022.    Melissa Maradiaga CMA

## 2022-06-16 PROBLEM — R73.03 PREDIABETES: Status: ACTIVE | Noted: 2022-06-16

## 2022-06-16 PROBLEM — I10 BENIGN ESSENTIAL HYPERTENSION: Status: ACTIVE | Noted: 2017-10-11

## 2022-06-17 NOTE — RESULT ENCOUNTER NOTE
Da,  Your results show you have prediabetes.  Your results were close to that for some one who is actually diabetic, so you are going to want to increase your exercise and watch your sugar intake.  Let me know if you would like to speak to a dietician in more detail about these matters.  We will need to check your blood sugar again in a year.  Please let me know if you have any questions.    Sincerely,  Dr. Benavides

## 2022-06-22 ENCOUNTER — HOSPITAL ENCOUNTER (OUTPATIENT)
Dept: PHYSICAL THERAPY | Facility: CLINIC | Age: 57
Setting detail: THERAPIES SERIES
Discharge: HOME | End: 2022-06-22
Attending: FAMILY MEDICINE
Payer: MEDICAID

## 2022-06-22 PROCEDURE — 97110 THERAPEUTIC EXERCISES: CPT | Mod: GP | Performed by: PHYSICAL THERAPIST

## 2022-06-23 ENCOUNTER — OFFICE VISIT (OUTPATIENT)
Dept: FAMILY MEDICINE | Facility: CLINIC | Age: 57
End: 2022-06-23
Payer: MEDICAID

## 2022-06-23 VITALS
TEMPERATURE: 97.7 F | SYSTOLIC BLOOD PRESSURE: 130 MMHG | HEART RATE: 95 BPM | DIASTOLIC BLOOD PRESSURE: 78 MMHG | OXYGEN SATURATION: 98 % | WEIGHT: 193 LBS | BODY MASS INDEX: 31.15 KG/M2

## 2022-06-23 DIAGNOSIS — E78.2 MIXED HYPERLIPIDEMIA: ICD-10-CM

## 2022-06-23 DIAGNOSIS — I10 BENIGN ESSENTIAL HYPERTENSION: Primary | ICD-10-CM

## 2022-06-23 DIAGNOSIS — R73.03 PREDIABETES: ICD-10-CM

## 2022-06-23 PROCEDURE — 99213 OFFICE O/P EST LOW 20 MIN: CPT | Performed by: FAMILY MEDICINE

## 2022-06-23 RX ORDER — LOSARTAN POTASSIUM 100 MG/1
100 TABLET ORAL DAILY
Qty: 90 TABLET | Refills: 4 | Status: SHIPPED | OUTPATIENT
Start: 2022-06-23 | End: 2023-06-23

## 2022-06-23 RX ORDER — AMLODIPINE BESYLATE 5 MG/1
5 TABLET ORAL DAILY
Qty: 90 TABLET | Refills: 4 | Status: SHIPPED | OUTPATIENT
Start: 2022-06-23 | End: 2022-11-15 | Stop reason: SINTOL

## 2022-06-23 ASSESSMENT — PAIN SCALES - GENERAL: PAINLEVEL: NO PAIN (0)

## 2022-06-23 NOTE — PROGRESS NOTES
"  Assessment & Plan     ASSESSMENT/ORDERS:    ICD-10-CM    1. Benign essential hypertension  I10 losartan (COZAAR) 100 MG tablet   2. Mixed hyperlipidemia  E78.2    3. Prediabetes  R73.03      PLAN:  1.  Continue losartan and amlodipine for hypertension management.  2.  Statin recommended due to elevated triglycerides and risk for heart disease.  He declined at this time.              BMI:   Estimated body mass index is 31.15 kg/m  as calculated from the following:    Height as of 5/27/22: 1.676 m (5' 6\").    Weight as of this encounter: 87.5 kg (193 lb).           Return in about 1 year (around 6/23/2023) for hypertension recheck.    Minh Benavides MD  [facility]    Randi Roberson is a 57 year old, presenting for the following health issues:  Hypertension      History of Present Illness       Hypertension: He presents for follow up of hypertension.  He does not check blood pressure  regularly outside of the clinic. Outside blood pressures have been over 140/90. He follows a low salt diet.             Hypertension improved since addition of amlodipine to losartan for management of hypertension.     Lab Results   Component Value Date    CHOL 223 05/27/2022    CHOL 199 01/19/2021     Lab Results   Component Value Date    HDL 40 05/27/2022    HDL 34 01/19/2021     Lab Results   Component Value Date    LDL  05/27/2022      Comment:      Cannot estimate LDL when triglyceride exceeds 400 mg/dL     05/27/2022    LDL 92 01/19/2021     Lab Results   Component Value Date    TRIG 470 05/27/2022    TRIG 365 01/19/2021     Reviewed this as well as recent   Lab Results   Component Value Date    A1C 6.4 06/09/2022    A1C 6.2 11/08/2018          Review of Systems         Objective    /78   Pulse 95   Temp 97.7  F (36.5  C) (Temporal)   Wt 87.5 kg (193 lb)   SpO2 98%   BMI 31.15 kg/m    Body mass index is 31.15 kg/m .  Physical Exam  Constitutional:       Appearance: He is " well-developed. He is obese.   Cardiovascular:      Rate and Rhythm: Normal rate and regular rhythm.      Heart sounds: Normal heart sounds, S1 normal and S2 normal. No murmur heard.  Pulmonary:      Effort: Pulmonary effort is normal. No respiratory distress.      Breath sounds: Normal breath sounds. No wheezing, rhonchi or rales.   Neurological:      Mental Status: He is alert.                            .  ..

## 2022-06-29 ASSESSMENT — HOOS JR
SITTING: MILD
RISING FROM SITTING: MILD
HOOS JR TOTAL INTERVAL SCORE: 67.52
LYING IN BED (TURNING OVER, MAINTAINING HIP POSITION): MODERATE
WALKING ON UNEVEN SURFACE: MILD
GOING UP OR DOWN STAIRS: MODERATE

## 2022-06-30 ENCOUNTER — OFFICE VISIT (OUTPATIENT)
Dept: ORTHOPEDICS | Facility: CLINIC | Age: 57
End: 2022-06-30
Payer: MEDICAID

## 2022-06-30 VITALS
BODY MASS INDEX: 31.02 KG/M2 | RESPIRATION RATE: 18 BRPM | WEIGHT: 193 LBS | HEART RATE: 76 BPM | HEIGHT: 66 IN | SYSTOLIC BLOOD PRESSURE: 154 MMHG | DIASTOLIC BLOOD PRESSURE: 92 MMHG

## 2022-06-30 DIAGNOSIS — M16.0 PRIMARY OSTEOARTHRITIS OF BOTH HIPS: ICD-10-CM

## 2022-06-30 DIAGNOSIS — M25.551 HIP PAIN, RIGHT: ICD-10-CM

## 2022-06-30 DIAGNOSIS — M25.859 FEMORAL ACETABULAR IMPINGEMENT: ICD-10-CM

## 2022-06-30 PROCEDURE — 99203 OFFICE O/P NEW LOW 30 MIN: CPT | Performed by: ORTHOPAEDIC SURGERY

## 2022-06-30 RX ORDER — DICLOFENAC SODIUM 75 MG/1
75 TABLET, DELAYED RELEASE ORAL 2 TIMES DAILY
Qty: 60 TABLET | Refills: 11 | Status: SHIPPED | OUTPATIENT
Start: 2022-06-30 | End: 2023-10-03

## 2022-06-30 NOTE — PATIENT INSTRUCTIONS
Da to follow up with Primary Care provider regarding elevated blood pressure.    Diagnosis:  femoral acetabular impingement, bilateral hip osteoarthritis.  Diclofenac 75 mg oral twice daily.   Consider hip injections again.  Total hip arthroplasty is in your future.

## 2022-06-30 NOTE — LETTER
6/30/2022         RE: Da Akins  [address] [facility] [address]        Dear Colleague,    Thank you for referring your patient, Da Akins, to the [facility]. Please see a copy of my visit note below.    Da Akins is a 57 year old male who is seen in consultation at the request of  Dr. Minh Benavides  for bilateral hip pains.  He has had this for approximately 5 years.  It hurts if he has been sitting too long and then gets up.  Walking hurts.  He describes occasional shooting pains rated 1-3 out of 10.  He has had steroid injections.  These gave fair relief.  He has not used anti-inflammatories.  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    X-ray done on 5/27/2022 and compared to x-rays from 2017 show bilateral femoral acetabular impingement with cam affect.  There is also moderate arthritis but still joint space preservation.  Hip is slightly worse on the right than the left, but his pain is much worse on the right than the left.    Past Medical History:   Diagnosis Date     Healthy adult      Seasonal allergies        Past Surgical History:   Procedure Laterality Date     ANKLE SURGERY       gnaglion cyst remove Right        Family History   Problem Relation Age of Onset     Diabetes Father      Hypertension Father      Cerebrovascular Disease Father      Colon Cancer Father 50     Colon Cancer Mother 72     Cerebrovascular Disease Maternal Grandfather      Coronary Artery Disease No family hx of      Hyperlipidemia No family hx of        Social History     Socioeconomic History     Marital status: Single     Spouse name: Not on file     Number of children: 3     Years of education: Not on file     Highest education level: Not on file   Occupational History     Not on file   Tobacco Use     Smoking status: Never Smoker     Smokeless tobacco: Never Used   Vaping Use     Vaping Use: Never used   Substance and Sexual  Activity     Alcohol use: Yes     Drug use: No     Sexual activity: Yes     Partners: Female     Birth control/protection: Male Surgical     Comment: 3 children.   Other Topics Concern     Parent/sibling w/ CABG, MI or angioplasty before 65F 55M? Not Asked   Social History Narrative     Not on file     Social Determinants of Health     Financial Resource Strain: Not on file   Food Insecurity: Not on file   Transportation Needs: Not on file   Physical Activity: Not on file   Stress: Not on file   Social Connections: Not on file   Intimate Partner Violence: Not on file   Housing Stability: Not on file       Current Outpatient Medications   Medication Sig Dispense Refill     diclofenac (VOLTAREN) 75 MG EC tablet Take 1 tablet (75 mg) by mouth 2 times daily 60 tablet 11     amLODIPine (NORVASC) 5 MG tablet Take 1 tablet (5 mg) by mouth daily 90 tablet 4     ketoconazole (NIZORAL) 2 % external cream Apply topically daily 60 g 3     losartan (COZAAR) 100 MG tablet Take 1 tablet (100 mg) by mouth daily 90 tablet 4     triamcinolone (KENALOG) 0.5 % external ointment APPLY TOPICALLY TO AFFECTED AREA(S) TWICE DAILY         Allergies   Allergen Reactions     No Known Allergies      Seasonal Allergies        REVIEW OF SYSTEMS:  CONSTITUTIONAL:  NEGATIVE for fever, chills, change in weight, not feeling tired  SKIN:  NEGATIVE for worrisome rashes, no skin lumps, no skin ulcers and no non-healing wounds  EYES:  NEGATIVE for vision changes or irritation.  ENT/MOUTH:  NEGATIVE.  No hearing loss, no hoarseness, no difficulty swallowing.  RESP:  NEGATIVE. No cough or shortness of breath.  CV:  NEGATIVE for chest pain, palpitations or peripheral edema  GI:  NEGATIVE for nausea, abdominal pain, heartburn, or change in bowel habits  :  Negative. No dysuria, no hematuria  MUSCULOSKELETAL:  See HPI above  NEURO:  NEGATIVE . No headaches, no dizziness,  no numbness  ENDOCRINE:  NEGATIVE for temperature intolerance, skin/hair  "changes  HEME/ALLERGY/IMMUNE:  NEGATIVE for bleeding problems  PSYCHIATRIC:  NEGATIVE. no anxiety, no depression.     Exam:  Vitals: BP (!) 154/92   Pulse 76   Resp 18   Ht 1.676 m (5' 6\")   Wt 87.5 kg (193 lb)   BMI 31.15 kg/m    BMI= Body mass index is 31.15 kg/m .  Constitutional:  healthy, alert and no distress  Neuro: Alert and Oriented x 3, no focal defects.  Psych: Affect normal   Respiratory: Breathing not labored.  Cardiovascular: normal peripheral pulses  Lymph: no adenopathy  Skin: No rashes,worrisome lesions or skin problems  He has no pain across the low back.  Right hip shows 45 degrees external rotation and 5 degrees internal rotation.  Left hip shows 45 degrees external rotation and 20 degrees internal rotation.  He has pain mainly on the right with the internal rotation.  He has no tenderness over the greater trochanters.  Sensation, motor and circulation are intact.    Assessment:  Bilateral hip osteoarthritis due to femoral acetabular impingement.    Plan:  Diclofenac 75 mg oral twice daily.   Consider steroid injections again.  Eventually total hip arthroplasty, but may be several years.  Loss of rotation is most likely to be the thing that pushes us to replace the hips.      Again, thank you for allowing me to participate in the care of your patient.        Sincerely,        Ronald Vieyra MD    "

## 2022-07-01 NOTE — PROGRESS NOTES
Da Akins is a 57 year old male who is seen in consultation at the request of  Dr. Minh Benavides  for bilateral hip pains.  He has had this for approximately 5 years.  It hurts if he has been sitting too long and then gets up.  Walking hurts.  He describes occasional shooting pains rated 1-3 out of 10.  He has had steroid injections.  These gave fair relief.  He has not used anti-inflammatories.  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    X-ray done on 5/27/2022 and compared to x-rays from 2017 show bilateral femoral acetabular impingement with cam affect.  There is also moderate arthritis but still joint space preservation.  Hip is slightly worse on the right than the left, but his pain is much worse on the right than the left.    Past Medical History:   Diagnosis Date     Healthy adult      Seasonal allergies        Past Surgical History:   Procedure Laterality Date     ANKLE SURGERY       gnaglion cyst remove Right        Family History   Problem Relation Age of Onset     Diabetes Father      Hypertension Father      Cerebrovascular Disease Father      Colon Cancer Father 50     Colon Cancer Mother 72     Cerebrovascular Disease Maternal Grandfather      Coronary Artery Disease No family hx of      Hyperlipidemia No family hx of        Social History     Socioeconomic History     Marital status: Single     Spouse name: Not on file     Number of children: 3     Years of education: Not on file     Highest education level: Not on file   Occupational History     Not on file   Tobacco Use     Smoking status: Never Smoker     Smokeless tobacco: Never Used   Vaping Use     Vaping Use: Never used   Substance and Sexual Activity     Alcohol use: Yes     Drug use: No     Sexual activity: Yes     Partners: Female     Birth control/protection: Male Surgical     Comment: 3 children.   Other Topics Concern     Parent/sibling w/ CABG, MI or angioplasty before 65F 55M? Not Asked  "  Social History Narrative     Not on file     Social Determinants of Health     Financial Resource Strain: Not on file   Food Insecurity: Not on file   Transportation Needs: Not on file   Physical Activity: Not on file   Stress: Not on file   Social Connections: Not on file   Intimate Partner Violence: Not on file   Housing Stability: Not on file       Current Outpatient Medications   Medication Sig Dispense Refill     diclofenac (VOLTAREN) 75 MG EC tablet Take 1 tablet (75 mg) by mouth 2 times daily 60 tablet 11     amLODIPine (NORVASC) 5 MG tablet Take 1 tablet (5 mg) by mouth daily 90 tablet 4     ketoconazole (NIZORAL) 2 % external cream Apply topically daily 60 g 3     losartan (COZAAR) 100 MG tablet Take 1 tablet (100 mg) by mouth daily 90 tablet 4     triamcinolone (KENALOG) 0.5 % external ointment APPLY TOPICALLY TO AFFECTED AREA(S) TWICE DAILY         Allergies   Allergen Reactions     No Known Allergies      Seasonal Allergies        REVIEW OF SYSTEMS:  CONSTITUTIONAL:  NEGATIVE for fever, chills, change in weight, not feeling tired  SKIN:  NEGATIVE for worrisome rashes, no skin lumps, no skin ulcers and no non-healing wounds  EYES:  NEGATIVE for vision changes or irritation.  ENT/MOUTH:  NEGATIVE.  No hearing loss, no hoarseness, no difficulty swallowing.  RESP:  NEGATIVE. No cough or shortness of breath.  CV:  NEGATIVE for chest pain, palpitations or peripheral edema  GI:  NEGATIVE for nausea, abdominal pain, heartburn, or change in bowel habits  :  Negative. No dysuria, no hematuria  MUSCULOSKELETAL:  See HPI above  NEURO:  NEGATIVE . No headaches, no dizziness,  no numbness  ENDOCRINE:  NEGATIVE for temperature intolerance, skin/hair changes  HEME/ALLERGY/IMMUNE:  NEGATIVE for bleeding problems  PSYCHIATRIC:  NEGATIVE. no anxiety, no depression.     Exam:  Vitals: BP (!) 154/92   Pulse 76   Resp 18   Ht 1.676 m (5' 6\")   Wt 87.5 kg (193 lb)   BMI 31.15 kg/m    BMI= Body mass index is 31.15 " kg/m .  Constitutional:  healthy, alert and no distress  Neuro: Alert and Oriented x 3, no focal defects.  Psych: Affect normal   Respiratory: Breathing not labored.  Cardiovascular: normal peripheral pulses  Lymph: no adenopathy  Skin: No rashes,worrisome lesions or skin problems  He has no pain across the low back.  Right hip shows 45 degrees external rotation and 5 degrees internal rotation.  Left hip shows 45 degrees external rotation and 20 degrees internal rotation.  He has pain mainly on the right with the internal rotation.  He has no tenderness over the greater trochanters.  Sensation, motor and circulation are intact.    Assessment:  Bilateral hip osteoarthritis due to femoral acetabular impingement.    Plan:  Diclofenac 75 mg oral twice daily.   Consider steroid injections again.  Eventually total hip arthroplasty, but may be several years.  Loss of rotation is most likely to be the thing that pushes us to replace the hips.

## 2022-07-03 PROBLEM — E78.2 MIXED HYPERLIPIDEMIA: Status: ACTIVE | Noted: 2022-07-03

## 2022-07-12 NOTE — PROGRESS NOTES
[facility]    Outpatient Physical Therapy Discharge Note  Patient: Da Akins  : 1965    Beginning/End Dates of Reporting Period:  2022 to 2022    Referring Provider: tonya sargent MD     Therapy Diagnosis: hip pain      Client Self Report: HEP is going well but overall 2 days have been more sore    Objective Measurements:      patient seen for 2 Rx sessions for instruction in exercises in clinic and HEP at last Rx he was completing the following   bridges , SKC, SLR, hip adduction isometric, side hip abduction , internal and external rotation , prone knee flexion 10x goal 30 , sleep posture, nustep level 1 x 10    Patient last seen on 2022 and was going to contact PT after doctor appt but as of 2022 he has made no further contact with PT           Goals:    Goal Identifier  1   Goal Description  instruction in HEP and compliant with it 5 of 7 days    Target Date  2022   Date Met      Progress (detail required for progress note):       Goal Identifier  2   Goal Description  patient to have reduction in pain level currently 0-6/10 goal is 0-3/10   Target Date  2022   Date Met      Progress (detail required for progress note):       Goal Identifier  3   Goal Description  patient to have improved tolerance to activity currently LEFS 48 goal is 55   Target Date  2022   Date Met      Progress (detail required for progress note):       Plan:  Discharge from therapy.    Discharge:    Reason for Discharge: No further expectation of progress.  Patient chooses to discontinue therapy.  Patient has failed to schedule further appointments.    Equipment Issued: none    Discharge Plan: Patient to continue home program.

## 2022-08-19 ENCOUNTER — VIRTUAL VISIT (OUTPATIENT)
Dept: FAMILY MEDICINE | Facility: CLINIC | Age: 57
End: 2022-08-19
Payer: MEDICAID

## 2022-08-19 DIAGNOSIS — L23.7 CONTACT DERMATITIS DUE TO POISON IVY: Primary | ICD-10-CM

## 2022-08-19 PROCEDURE — 99213 OFFICE O/P EST LOW 20 MIN: CPT | Mod: GT | Performed by: PHYSICIAN ASSISTANT

## 2022-08-19 RX ORDER — PREDNISONE 10 MG/1
TABLET ORAL
Qty: 42 TABLET | Refills: 0 | Status: SHIPPED | OUTPATIENT
Start: 2022-08-19 | End: 2022-09-27

## 2022-08-19 NOTE — PROGRESS NOTES
Da is a 57 year old who is being evaluated via a billable video visit.      How would you like to obtain your AVS? Mail a copy  If the video visit is dropped, the invitation should be resent by: Text to cell phone: [phone]  Will anyone else be joining your video visit? No            Subjective   Da is a 57 year old7}, presenting for the following health issues:  No chief complaint on file.      History of Present Illness       Reason for visit:  Poison  ivy    He eats 0-1 servings of fruits and vegetables daily.He consumes 0 sweetened beverage(s) daily.He exercises with enough effort to increase his heart rate 10 to 19 minutes per day.  He exercises with enough effort to increase his heart rate 4 days per week.   He is taking medications regularly.       Possible Poison Ivy  Rash on forearms and feet. Papules. Linear presentation  No wheezing . No other novel exposures. No fevers. No cold symptoms.     Review of Systems   Constitutional, HEENT, cardiovascular, pulmonary, GI, , musculoskeletal, neuro, skin, endocrine and psych systems are negative, except as otherwise noted.      Objective           Vitals:  No vitals were obtained today due to virtual visit.    Physical Exam   GENERAL: Healthy, alert and no distress  EYES: Eyes grossly normal to inspection.  No discharge or erythema, or obvious scleral/conjunctival abnormalities.  RESP: No audible wheeze, cough, or visible cyanosis.  No visible retractions or increased work of breathing.    NEURO: Cranial nerves grossly intact.  Mentation and speech appropriate for age.  PSYCH: Mentation appears normal, affect normal/bright, judgement and insight intact, normal speech and appearance well-groomed.  Skin: papular rash with a linear distribution on the above mentioned areas.    Da was seen today for derm problem.    Diagnoses and all orders for this visit:    Contact dermatitis due to poison ivy  -     predniSONE (DELTASONE) 10 MG tablet; Take 60 mg  days 1 and 2, 50 mg days 3 and 4, 40 mg days 5 and 6, 30 mg days 7 and 8, 20 mg days 9 and 10, 10 mg days 11 and 12      Advised supportive and symptomatic treatment.  Follow up with Provider - if condition persists or worsens.           Video-Visit Details    Video Start Time: 10:18 AM    Type of service:  Video Visit    Video End Time:10:25 AM    Originating Location (pt. Location): Home    Distant Location (provider location):  [facility]     Platform used for Video Visit: Doximity    .  ..

## 2022-10-15 ENCOUNTER — HEALTH MAINTENANCE LETTER (OUTPATIENT)
Age: 57
End: 2022-10-15

## 2022-10-31 ENCOUNTER — TELEPHONE (OUTPATIENT)
Dept: GASTROENTEROLOGY | Facility: CLINIC | Age: 57
End: 2022-10-31

## 2022-10-31 NOTE — TELEPHONE ENCOUNTER
Screening Questions  BLUE  KIND OF PREP RED  LOCATION [review exclusion criteria] GREEN  SEDATION TYPE        Y Are you active on mychart?       Minh Benavides MD in [facility] Ordering/Referring Provider?        MA What type of coverage do you have?      N Have you had a positive covid test in the last 90 days?     31.1 1. BMI  [BMI 40+ - review exclusion criteria]    Y  2. Are you able to give consent for your medical care? [IF NO,RN REVIEW]        N  3. Are you taking any prescription pain medications on a routine schedule?        3a. EXTENDED PREP What kind of prescription?     N 4. Do you have any chemical dependencies such as alcohol, street drugs, or methadone?    N 5. Do you have any history of post-traumatic stress syndrome, severe anxiety or history of psychosis?      **If yes 3- 5 , please schedule with MAC sedation.**          IF YES TO ANY 6 - 10 - HOSPITAL SETTING ONLY.     N 6.   Do you need assistance transferring?     N 7.   Have you had a heart or lung transplant?    N 8.   Are you currently on dialysis?   N 9.   Do you use daily home oxygen?   N 10. Do you take nitroglycerin?   10a.  If yes, how often?     11. [FEMALES]   Are you currently pregnant?    11a.  If yes, how many weeks? [ Greater than 12 weeks, OR NEEDED]    N 12. Do you have Pulmonary Hypertension? *NEED PAC APPT AT [facility]*     N 13. [review exclusion criteria]  Do you have any implantable devices in your body (pacemaker, defib, LVAD)?    N 14. In the past 6 months, have you had any heart related issues including cardiomyopathy or heart attack?     14a.  If yes, did it require cardiac stenting if so when?     N 15. Have you had a stroke or Transient ischemic attack (TIA - aka  mini stroke ) within 6 months?      N 16. Do you have mod to severe Obstructive Sleep Apnea?  [Hospital only - Ok at [facility]]    N 17. Do you have SEVERE AND UNCONTROLLED asthma? *NEED PAC APPT AT [facility]*     N 18. Are you currently taking  "any blood thinners?     18a. If yes, inform patient to \"follow up w/ ordering provider for bridging instructions.\"    N 19. Do you take the medication Phentermine?    19a. If yes, \"Hold for 7 days before procedure.  Please consult your prescribing provider if you have questions about holding this medication.\"     N  20. Do you have chronic kidney disease?      N  21. Do you have a diagnosis of diabetes?     N  22. On a regular basis do you go 3-5 days between bowel movements?      23. Preferred LOCAL Pharmacy for Pre Prescription    [ LIST ONLY ONE PHARMACY]        [place], [place] - [address] [place] [address] DR        - CLOSING REMINDERS -    Informed patient they will need an adult    Cannot take any type of public or medical transportation alone    Conscious Sedation- Needs  for 6 hours after the procedure       MAC/General-Needs  for 24 hours after procedure    Pre-Procedure Covid test to be completed [[facility] PCR Testing Required]    Confirmed Nurse will call to complete assessment       - SCHEDULING DETAILS -     Tarik  Surgeon    01/23/23  Date of Procedure  Lower Endoscopy [Colonoscopy]  Type of Procedure Scheduled    Location  [facility] PREP-If you answer yes to questions #8, #20, #21Which Colonoscopy Prep was Sent?     MAC Sedation Type     N PAC / Pre-op Required         Additional comments:            "

## 2022-11-01 ENCOUNTER — ALLIED HEALTH/NURSE VISIT (OUTPATIENT)
Dept: FAMILY MEDICINE | Facility: CLINIC | Age: 57
End: 2022-11-01
Payer: MEDICAID

## 2022-11-01 VITALS — SYSTOLIC BLOOD PRESSURE: 150 MMHG | DIASTOLIC BLOOD PRESSURE: 88 MMHG

## 2022-11-01 DIAGNOSIS — I10 BENIGN ESSENTIAL HYPERTENSION: ICD-10-CM

## 2022-11-01 DIAGNOSIS — Z01.30 BP CHECK: Primary | ICD-10-CM

## 2022-11-01 PROCEDURE — 99207 PR NO CHARGE NURSE ONLY: CPT

## 2022-11-01 NOTE — PROGRESS NOTES
Da Akins is a 57 year old patient who comes in today for a Blood Pressure check.  Initial BP:  BP (!) 150/88      Data Unavailable  Disposition: provider notified while patient in the clinic    Dr Benavides increased his amlodipine to 10 mg daily and appt made for Nov 10th at 320arrival 3pm.     Emma Quiles MA 11/1/2022

## 2022-11-10 ENCOUNTER — ALLIED HEALTH/NURSE VISIT (OUTPATIENT)
Dept: FAMILY MEDICINE | Facility: CLINIC | Age: 57
End: 2022-11-10
Payer: MEDICAID

## 2022-11-10 VITALS — HEART RATE: 73 BPM | SYSTOLIC BLOOD PRESSURE: 142 MMHG | DIASTOLIC BLOOD PRESSURE: 89 MMHG

## 2022-11-10 DIAGNOSIS — I10 PRIMARY HYPERTENSION: Primary | ICD-10-CM

## 2022-11-10 PROCEDURE — 99207 PR NO CHARGE NURSE ONLY: CPT

## 2022-11-10 NOTE — PROGRESS NOTES
I met with Da Akins at the request of Dr. Benavides to recheck his blood pressure.  Blood pressure medications on the med list were reviewed with patient.    Patient has taken all medications as per usual regimen: Yes  Patient reports tolerating them without any issues or concerns: No  Patient increased his amlodipine to 10mg daily last Tuesday 11/01/22 at which time he noticed that his ankles started to swell tremendously; he states that they are almost the size of his calves at the end of the day.  He elevates his legs at night and often times his entire legs feel swollen.    He mentioned that when he started amlodipine 5mg daily he had slight swelling but with the increase to 10mg daily it has significantly caused excessive swelling.  Vitals:    11/10/22 1516 11/10/22 1528   BP: (!) 153/90 (!) 142/89   BP Location: Left arm Left arm   Patient Position: Sitting Sitting   Cuff Size: Adult Large Adult Large   Pulse: 69 73       After 5 minutes, the patient's blood pressure remained greater than or equal to 140/90.    Is the patient currently having any chest pain? No  Does the patient currently have a headache? No  Does the patient currently have any vision changes? No  Does the patient currently have any nausea? No  Does the patient currently have any abdominal pain? No    Patient was instructed to decrease amlodipine back to 5mg daily and schedule an appointment for next week per Dr. Benavides.  Patient is scheduled for next Tuesday 11/15/22.    Patient is wondering about getting his Calcium checked to check for risk of heart attacks.  Informed patient that he can discuss this at his appointment with Dr. Benavides next week.

## 2022-11-15 ENCOUNTER — OFFICE VISIT (OUTPATIENT)
Dept: FAMILY MEDICINE | Facility: CLINIC | Age: 57
End: 2022-11-15
Payer: MEDICAID

## 2022-11-15 VITALS
RESPIRATION RATE: 20 BRPM | OXYGEN SATURATION: 99 % | DIASTOLIC BLOOD PRESSURE: 84 MMHG | TEMPERATURE: 97.9 F | BODY MASS INDEX: 33.63 KG/M2 | WEIGHT: 208.38 LBS | SYSTOLIC BLOOD PRESSURE: 132 MMHG | HEART RATE: 68 BPM

## 2022-11-15 DIAGNOSIS — T88.7XXA MEDICATION SIDE EFFECT: ICD-10-CM

## 2022-11-15 DIAGNOSIS — I10 BENIGN ESSENTIAL HYPERTENSION: Primary | ICD-10-CM

## 2022-11-15 PROCEDURE — 99214 OFFICE O/P EST MOD 30 MIN: CPT | Performed by: FAMILY MEDICINE

## 2022-11-15 RX ORDER — CHLORTHALIDONE 25 MG/1
25 TABLET ORAL DAILY
Qty: 30 TABLET | Refills: 1 | Status: SHIPPED | OUTPATIENT
Start: 2022-11-15 | End: 2023-01-11

## 2022-11-15 ASSESSMENT — PAIN SCALES - GENERAL: PAINLEVEL: NO PAIN (0)

## 2022-11-15 NOTE — PROGRESS NOTES
"  Assessment & Plan     ASSESSMENT/ORDERS:    ICD-10-CM    1. Benign essential hypertension  I10 chlorthalidone (HYGROTON) 25 MG tablet     Basic metabolic panel  (Ca, Cl, CO2, Creat, Gluc, K, Na, BUN)      2. Medication side effect  T88.7XXA         PLAN:  1.  Discontinue amlodipine due to swelling, start chlorthalidone to manage hypertension.    2.  Recheck blood pressure in 2 weeks, if not at goal would consider adding back 2.5 mg amlodipine or even spironolactone.  If blood pressure at goal, continue medication and next visit at prevent visit in June.              BMI:   Estimated body mass index is 33.63 kg/m  as calculated from the following:    Height as of 6/30/22: 1.676 m (5' 6\").    Weight as of this encounter: 94.5 kg (208 lb 6 oz).           Return in about 2 weeks (around 11/29/2022) for nurse only visit, lab only visit.    Minh Benavides MD  [facility]    Randi Roberson is a 57 year old, presenting for the following health issues:  Recheck Medication      History of Present Illness       Hypertension: He presents for follow up of hypertension.  He does not check blood pressure  regularly outside of the clinic. Outside blood pressures have been over 140/90. He does not follow a low salt diet.             Elevated blood pressure at dental visit a few weeks ago.  Amlodipine increased to 10 mg, bad swelling, dose decreased to 5 mg but still having swelling.  Wants alternative.  On losartan full dose.    Review of Systems         Objective    /84   Pulse 68   Temp 97.9  F (36.6  C) (Temporal)   Resp 20   Wt 94.5 kg (208 lb 6 oz)   SpO2 99%   BMI 33.63 kg/m    Body mass index is 33.63 kg/m .  Physical Exam  Constitutional:       Appearance: He is well-developed and well-nourished.   Cardiovascular:      Rate and Rhythm: Normal rate and regular rhythm.      Heart sounds: Normal heart sounds, S1 normal and S2 normal. No murmur heard.  Pulmonary:      Effort: " Pulmonary effort is normal. No respiratory distress.      Breath sounds: Normal breath sounds. No wheezing, rhonchi or rales.   Musculoskeletal:      Right lower le+ Pitting Edema present.      Left lower le+ Pitting Edema present.   Neurological:      Mental Status: He is alert.

## 2022-11-28 ENCOUNTER — ALLIED HEALTH/NURSE VISIT (OUTPATIENT)
Dept: FAMILY MEDICINE | Facility: CLINIC | Age: 57
End: 2022-11-28
Payer: MEDICAID

## 2022-11-28 ENCOUNTER — LAB (OUTPATIENT)
Dept: LAB | Facility: CLINIC | Age: 57
End: 2022-11-28
Payer: MEDICAID

## 2022-11-28 VITALS — HEART RATE: 76 BPM | DIASTOLIC BLOOD PRESSURE: 96 MMHG | SYSTOLIC BLOOD PRESSURE: 144 MMHG

## 2022-11-28 DIAGNOSIS — I10 BENIGN ESSENTIAL HYPERTENSION: Primary | ICD-10-CM

## 2022-11-28 DIAGNOSIS — I10 BENIGN ESSENTIAL HYPERTENSION: ICD-10-CM

## 2022-11-28 LAB
ANION GAP SERPL CALCULATED.3IONS-SCNC: 6 MMOL/L (ref 3–14)
BUN SERPL-MCNC: 22 MG/DL (ref 7–30)
CALCIUM SERPL-MCNC: 9.2 MG/DL (ref 8.5–10.1)
CHLORIDE BLD-SCNC: 104 MMOL/L (ref 94–109)
CO2 SERPL-SCNC: 27 MMOL/L (ref 20–32)
CREAT SERPL-MCNC: 0.92 MG/DL (ref 0.66–1.25)
GFR SERPL CREATININE-BSD FRML MDRD: >90 ML/MIN/1.73M2
GLUCOSE BLD-MCNC: 157 MG/DL (ref 70–99)
POTASSIUM BLD-SCNC: 3.6 MMOL/L (ref 3.4–5.3)
SODIUM SERPL-SCNC: 137 MMOL/L (ref 133–144)

## 2022-11-28 PROCEDURE — 99207 PR NO CHARGE NURSE ONLY: CPT

## 2022-11-28 PROCEDURE — 80048 BASIC METABOLIC PNL TOTAL CA: CPT

## 2022-11-28 PROCEDURE — 36415 COLL VENOUS BLD VENIPUNCTURE: CPT

## 2022-11-28 RX ORDER — AMLODIPINE BESYLATE 2.5 MG/1
5 TABLET ORAL DAILY
Qty: 30 TABLET | Refills: 1 | Status: SHIPPED | OUTPATIENT
Start: 2022-11-28 | End: 2022-12-12

## 2022-11-28 NOTE — PROGRESS NOTES
Da Akins is a 57 year old patient who comes in today for a Blood Pressure check.  Initial BP:  BP (!) 144/96   Pulse 76      76  Disposition: provider notified while patient in the clinic

## 2022-12-05 NOTE — RESULT ENCOUNTER NOTE
Da,  Your results are within normal limits.  Please let me know if you have any questions.    Sincerely,  Dr. Benavides

## 2022-12-12 ENCOUNTER — ALLIED HEALTH/NURSE VISIT (OUTPATIENT)
Dept: FAMILY MEDICINE | Facility: CLINIC | Age: 57
End: 2022-12-12
Payer: COMMERCIAL

## 2022-12-12 VITALS — SYSTOLIC BLOOD PRESSURE: 136 MMHG | DIASTOLIC BLOOD PRESSURE: 88 MMHG

## 2022-12-12 DIAGNOSIS — I10 BENIGN ESSENTIAL HYPERTENSION: ICD-10-CM

## 2022-12-12 DIAGNOSIS — I10 BENIGN ESSENTIAL HYPERTENSION: Primary | ICD-10-CM

## 2022-12-12 PROCEDURE — 99207 PR NO CHARGE NURSE ONLY: CPT

## 2022-12-12 RX ORDER — AMLODIPINE BESYLATE 5 MG/1
5 TABLET ORAL DAILY
Qty: 30 TABLET | Refills: 1 | Status: SHIPPED | OUTPATIENT
Start: 2022-12-12 | End: 2023-02-22

## 2022-12-12 NOTE — PROGRESS NOTES
Da Akins is a 57 year old patient who comes in today for a Blood Pressure check.  Initial BP:  /88      Data Unavailable  Disposition: follow-up as previously indicated by provider    .Emma Quiles MA 12/12/2022

## 2022-12-13 NOTE — TELEPHONE ENCOUNTER
Medication refilled.    Will have staff notify patient.  Please ask if his swelling is better.  If not, he should see me for follow-up on his blood pressure medication.    Minh Benavides MD

## 2022-12-14 RX ORDER — CHLORTHALIDONE 25 MG/1
25 TABLET ORAL DAILY
Qty: 30 TABLET | Refills: 1 | OUTPATIENT
Start: 2022-12-14

## 2023-01-09 DIAGNOSIS — I10 BENIGN ESSENTIAL HYPERTENSION: ICD-10-CM

## 2023-01-11 RX ORDER — CHLORTHALIDONE 25 MG/1
25 TABLET ORAL DAILY
Qty: 30 TABLET | Refills: 5 | Status: SHIPPED | OUTPATIENT
Start: 2023-01-11 | End: 2023-05-25

## 2023-01-11 NOTE — TELEPHONE ENCOUNTER
Prescription approved per [facility] Refill Protocol.  Sharon Boudreaux RN on 1/11/2023 at 1:12 PM

## 2023-01-23 ENCOUNTER — HOSPITAL ENCOUNTER (OUTPATIENT)
Facility: CLINIC | Age: 58
Discharge: HOME | End: 2023-01-23
Attending: FAMILY MEDICINE | Admitting: FAMILY MEDICINE
Payer: COMMERCIAL

## 2023-01-23 ENCOUNTER — ANESTHESIA (OUTPATIENT)
Dept: GASTROENTEROLOGY | Facility: CLINIC | Age: 58
End: 2023-01-23
Payer: COMMERCIAL

## 2023-01-23 ENCOUNTER — ANESTHESIA EVENT (OUTPATIENT)
Dept: GASTROENTEROLOGY | Facility: CLINIC | Age: 58
End: 2023-01-23
Payer: COMMERCIAL

## 2023-01-23 VITALS
DIASTOLIC BLOOD PRESSURE: 83 MMHG | TEMPERATURE: 98 F | OXYGEN SATURATION: 96 % | HEART RATE: 63 BPM | SYSTOLIC BLOOD PRESSURE: 131 MMHG | RESPIRATION RATE: 16 BRPM

## 2023-01-23 LAB — COLONOSCOPY: NORMAL

## 2023-01-23 PROCEDURE — 250N000009 HC RX 250: Performed by: NURSE ANESTHETIST, CERTIFIED REGISTERED

## 2023-01-23 PROCEDURE — 258N000003 HC RX IP 258 OP 636: Performed by: NURSE ANESTHETIST, CERTIFIED REGISTERED

## 2023-01-23 PROCEDURE — 45378 DIAGNOSTIC COLONOSCOPY: CPT | Performed by: FAMILY MEDICINE

## 2023-01-23 PROCEDURE — 370N000017 HC ANESTHESIA TECHNICAL FEE, PER MIN: Performed by: FAMILY MEDICINE

## 2023-01-23 PROCEDURE — 250N000011 HC RX IP 250 OP 636: Performed by: NURSE ANESTHETIST, CERTIFIED REGISTERED

## 2023-01-23 PROCEDURE — G0105 COLORECTAL SCRN; HI RISK IND: HCPCS | Performed by: FAMILY MEDICINE

## 2023-01-23 RX ORDER — PROPOFOL 10 MG/ML
INJECTION, EMULSION INTRAVENOUS PRN
Status: DISCONTINUED | OUTPATIENT
Start: 2023-01-23 | End: 2023-01-23

## 2023-01-23 RX ORDER — SODIUM CHLORIDE, SODIUM LACTATE, POTASSIUM CHLORIDE, CALCIUM CHLORIDE 600; 310; 30; 20 MG/100ML; MG/100ML; MG/100ML; MG/100ML
INJECTION, SOLUTION INTRAVENOUS CONTINUOUS
Status: DISCONTINUED | OUTPATIENT
Start: 2023-01-23 | End: 2023-01-23 | Stop reason: HOSPADM

## 2023-01-23 RX ORDER — ONDANSETRON 2 MG/ML
4 INJECTION INTRAMUSCULAR; INTRAVENOUS EVERY 6 HOURS PRN
Status: DISCONTINUED | OUTPATIENT
Start: 2023-01-23 | End: 2023-01-23 | Stop reason: HOSPADM

## 2023-01-23 RX ORDER — ONDANSETRON 4 MG/1
4 TABLET, ORALLY DISINTEGRATING ORAL EVERY 6 HOURS PRN
Status: DISCONTINUED | OUTPATIENT
Start: 2023-01-23 | End: 2023-01-23 | Stop reason: HOSPADM

## 2023-01-23 RX ORDER — FLUMAZENIL 0.1 MG/ML
0.2 INJECTION, SOLUTION INTRAVENOUS
Status: DISCONTINUED | OUTPATIENT
Start: 2023-01-23 | End: 2023-01-23 | Stop reason: HOSPADM

## 2023-01-23 RX ORDER — ONDANSETRON 2 MG/ML
4 INJECTION INTRAMUSCULAR; INTRAVENOUS
Status: DISCONTINUED | OUTPATIENT
Start: 2023-01-23 | End: 2023-01-23 | Stop reason: HOSPADM

## 2023-01-23 RX ORDER — LIDOCAINE 40 MG/G
CREAM TOPICAL
Status: DISCONTINUED | OUTPATIENT
Start: 2023-01-23 | End: 2023-01-23 | Stop reason: HOSPADM

## 2023-01-23 RX ORDER — NALOXONE HYDROCHLORIDE 0.4 MG/ML
0.2 INJECTION, SOLUTION INTRAMUSCULAR; INTRAVENOUS; SUBCUTANEOUS
Status: DISCONTINUED | OUTPATIENT
Start: 2023-01-23 | End: 2023-01-23 | Stop reason: HOSPADM

## 2023-01-23 RX ORDER — LIDOCAINE HYDROCHLORIDE 20 MG/ML
INJECTION, SOLUTION INFILTRATION; PERINEURAL PRN
Status: DISCONTINUED | OUTPATIENT
Start: 2023-01-23 | End: 2023-01-23

## 2023-01-23 RX ORDER — ACETAMINOPHEN 325 MG/1
975 TABLET ORAL ONCE
Status: DISCONTINUED | OUTPATIENT
Start: 2023-01-23 | End: 2023-01-23 | Stop reason: HOSPADM

## 2023-01-23 RX ORDER — PROPOFOL 10 MG/ML
INJECTION, EMULSION INTRAVENOUS CONTINUOUS PRN
Status: DISCONTINUED | OUTPATIENT
Start: 2023-01-23 | End: 2023-01-23

## 2023-01-23 RX ORDER — NALOXONE HYDROCHLORIDE 0.4 MG/ML
0.4 INJECTION, SOLUTION INTRAMUSCULAR; INTRAVENOUS; SUBCUTANEOUS
Status: DISCONTINUED | OUTPATIENT
Start: 2023-01-23 | End: 2023-01-23 | Stop reason: HOSPADM

## 2023-01-23 RX ORDER — PROCHLORPERAZINE MALEATE 5 MG
10 TABLET ORAL EVERY 6 HOURS PRN
Status: DISCONTINUED | OUTPATIENT
Start: 2023-01-23 | End: 2023-01-23 | Stop reason: HOSPADM

## 2023-01-23 RX ADMIN — LIDOCAINE HYDROCHLORIDE 50 MG: 20 INJECTION, SOLUTION INFILTRATION; PERINEURAL at 10:31

## 2023-01-23 RX ADMIN — PROPOFOL 100 MG: 10 INJECTION, EMULSION INTRAVENOUS at 10:31

## 2023-01-23 RX ADMIN — SODIUM CHLORIDE, POTASSIUM CHLORIDE, SODIUM LACTATE AND CALCIUM CHLORIDE: 600; 310; 30; 20 INJECTION, SOLUTION INTRAVENOUS at 09:50

## 2023-01-23 RX ADMIN — PROPOFOL 150 MCG/KG/MIN: 10 INJECTION, EMULSION INTRAVENOUS at 10:31

## 2023-01-23 ASSESSMENT — ACTIVITIES OF DAILY LIVING (ADL): ADLS_ACUITY_SCORE: 35

## 2023-01-23 NOTE — ANESTHESIA CARE TRANSFER NOTE
Patient: Da Akins    Procedure: Procedure(s):  COLONOSCOPY       Diagnosis: Special screening for malignant neoplasms, colon [Z12.11]  Diagnosis Additional Information: No value filed.    Anesthesia Type:   MAC     Note:    Oropharynx: oropharynx clear of all foreign objects and spontaneously breathing  Level of Consciousness: drowsy  Oxygen Supplementation: face mask    Independent Airway: airway patency satisfactory and stable  Dentition: dentition unchanged  Vital Signs Stable: post-procedure vital signs reviewed and stable  Report to RN Given: handoff report given  Patient transferred to: Phase II  Comments: Tyler Mathews CRNA  Handoff Report: Identifed the Patient, Identified the Reponsible Provider, Reviewed the pertinent medical history, Discussed the surgical course, Reviewed Intra-OP anesthesia mangement and issues during anesthesia, Set expectations for post-procedure period and Allowed opportunity for questions and acknowledgement of understanding      Vitals:  Vitals Value Taken Time   /83 01/23/23 1106   Temp     Pulse 63 01/23/23 1106   Resp     SpO2 96 % 01/23/23 1107   Vitals shown include unvalidated device data.    Electronically Signed By: SEVEN Betts CRNA  January 23, 2023  4:13 PM

## 2023-01-23 NOTE — DISCHARGE INSTRUCTIONS
[facility]    Home Care Following Endoscopy          Activity:  You have just undergone an endoscopic procedure usually performed with conscious sedation.  Do not work or operate machinery (including a car) for at least 12 hours.    I encourage you to walk and attempt to pass this air as soon as possible.    Diet:  Return to the diet you were on before your procedure but eat lightly for the first 12-24 hours.  Drink plenty of water.  Resume any regular medications unless otherwise advised by your physician.  Please begin any new medication prescribed as a result of your procedure as directed by your physician.   If you had any biopsy or polyp removed please refrain from aspirin or aspirin products for 2 days.  If on Coumadin please restart as instructed by your physician.   Pain:  You may take Tylenol as needed for pain.  Expected Recovery:  You can expect some mild abdominal fullness and/or discomfort due to the air used to inflate your intestinal tract. It is also normal to have a mild sore throat after upper endoscopy.    Call Your Physician if You Have:  After Colonoscopy:  Worsening persisting abdominal pain which is worse with activity.  Fevers (>101 degrees F), chills or shakes.  Passage of continued blood with bowel movements.   Any questions or concerns about your recovery, please call [phone] or after hours [phone] Nurse Advice Line.    Follow-up Care:  You did have polyps/biopsy tissue sample(s) removed.  The polyps/biopsy tissue sample(s) will be sent to pathology.  You should receive letter in your My Chart from Dr Montanez with your results within 1-2 weeks. If you do not participate in My Chart a physical letter will come in the mail in 2-3 weeks.  Please call if you have not received a notification of your results.  If asked to return to clinic please make an appointment 1 week after your procedure.  Call [phone].

## 2023-01-23 NOTE — H&P
"Pre-Endoscopy History and Physical     aD Akins MRN# [id number]   YOB: 1965 Age: 57 year old     Date of Procedure: (Not on file)  Primary care provider: Minh Benavides  Type of Endoscopy: colonoscopy  Type of Anesthesia Anticipated: MAC     HPI:    Da is a 57 year old male who was referred to me for colonoscopy.    Da is feeling well today.     Patient Active Problem List   Diagnosis     Benign essential hypertension     Family history of diabetes mellitus     Prediabetes     Primary osteoarthritis of both hips     Femoral acetabular impingement     Mixed hyperlipidemia            PHYSICAL EXAM:   There were no vitals taken for this visit.   Estimated body mass index is 33.63 kg/m  as calculated from the following:    Height as of 6/30/22: 1.676 m (5' 6\").    Weight as of 11/15/22: 94.5 kg (208 lb 6 oz).    GENERAL APPEARANCE: alert and no distress. Appears to comprehend the procedure and indication  RESP: lungs unlabored  CV: regular  ABD: soft, nt/nd    DIAGNOSTICS:      COVID-19 PCR Results    COVID-19 PCR Results   No data to display.         COVID-19 Antibody Results, Testing for Immunity    COVID-19 Antibody Results, Testing for Immunity   No data to display.              IMPRESSION   ASA Class 2- Healthy patient, no medical problems        PLAN:     Plan for colonoscopy. No medical contraindications to proceed, or further work up needed. The risks and benefits of the procedure and the sedation options and risks were discussed with the patient. These include infection, bleeding, and small risk of colon perforation (1/1000 to 1/53880 depending on patient characteristics and type of procedure). Da was also explained to alternatives for colo-rectal screening. All questions were answered and informed consent was obtained.      The above has been forwarded to the consulting provider.      Signed Electronically by: Jose Montanez MD  January 23, 2023     "

## 2023-01-23 NOTE — ANESTHESIA POSTPROCEDURE EVALUATION
Patient: Da Akins    Procedure: Procedure(s):  COLONOSCOPY       Anesthesia Type:  MAC    Note:  Disposition: Outpatient   Postop Pain Control: Uneventful            Sign Out: Well controlled pain   PONV: No   Neuro/Psych: Uneventful            Sign Out: Acceptable/Baseline neuro status   Airway/Respiratory: Uneventful            Sign Out: Acceptable/Baseline resp. status   CV/Hemodynamics: Uneventful            Sign Out: Acceptable CV status   Other NRE: NONE   DID A NON-ROUTINE EVENT OCCUR? No    Event details/Postop Comments:  Pt was happy with anesthesia care.  No complications.  I will follow up with the pt if needed.           Last vitals:  Vitals Value Taken Time   /83 01/23/23 1106   Temp     Pulse 63 01/23/23 1106   Resp     SpO2 96 % 01/23/23 1107   Vitals shown include unvalidated device data.    Electronically Signed By: SEVEN Betts CRNA  January 23, 2023  4:14 PM

## 2023-01-23 NOTE — ANESTHESIA PREPROCEDURE EVALUATION
Anesthesia Pre-Procedure Evaluation    Patient: Da Akins   MRN: [id number] : 1965        Procedure : Procedure(s):  COLONOSCOPY          Past Medical History:   Diagnosis Date     Healthy adult      Seasonal allergies       Past Surgical History:   Procedure Laterality Date     ANKLE SURGERY       gnaglion cyst remove Right       Allergies   Allergen Reactions     Seasonal Allergies       Social History     Tobacco Use     Smoking status: Never     Smokeless tobacco: Never   Substance Use Topics     Alcohol use: Yes      Wt Readings from Last 1 Encounters:   11/15/22 94.5 kg (208 lb 6 oz)        Anesthesia Evaluation   Pt has had prior anesthetic. Type: General.    No history of anesthetic complications       ROS/MED HX  ENT/Pulmonary:  - neg pulmonary ROS     Neurologic:  - neg neurologic ROS     Cardiovascular:     (+) hypertension-----    METS/Exercise Tolerance:     Hematologic:  - neg hematologic  ROS     Musculoskeletal:  - neg musculoskeletal ROS     GI/Hepatic:     (+) bowel prep,     Renal/Genitourinary:  - neg Renal ROS     Endo:     (+) Last HgA1c: 6.4, date: 2022,     Psychiatric/Substance Use:  - neg psychiatric ROS     Infectious Disease:  - neg infectious disease ROS     Malignancy:  - neg malignancy ROS     Other:  - neg other ROS          Physical Exam    Airway        Mallampati: II   TM distance: > 3 FB   Neck ROM: full   Mouth opening: > 3 cm    Respiratory Devices and Support         Dental       (+) Completely normal teeth      Cardiovascular   cardiovascular exam normal          Pulmonary   pulmonary exam normal                OUTSIDE LABS:  CBC:   Lab Results   Component Value Date    WBC 9.9 2015    HGB 15.0 2015    HCT 43.4 2015     2015     BMP:   Lab Results   Component Value Date     2022     2022    POTASSIUM 3.6 2022    POTASSIUM 4.0 2022    CHLORIDE 104 2022    CHLORIDE 107 2022    CO2 27  11/28/2022    CO2 28 05/27/2022    BUN 22 11/28/2022    BUN 13 05/27/2022    CR 0.92 11/28/2022    CR 0.85 05/27/2022     (H) 11/28/2022     (H) 05/27/2022     COAGS: No results found for: PTT, INR, FIBR  POC: No results found for: BGM, HCG, HCGS  HEPATIC:   Lab Results   Component Value Date    ALBUMIN 3.9 10/11/2017    PROTTOTAL 7.6 10/11/2017    ALT 33 10/11/2017    AST 19 10/11/2017    ALKPHOS 76 10/11/2017    BILITOTAL 0.6 10/11/2017     OTHER:   Lab Results   Component Value Date    A1C 6.4 (H) 06/09/2022    ORTEGA 9.2 11/28/2022    TSH 0.72 10/11/2017       Anesthesia Plan    ASA Status:  2   NPO Status:  NPO Appropriate    Anesthesia Type: MAC.     - Reason for MAC: immobility needed   Induction: Propofol, Intravenous.   Maintenance: TIVA.        Consents    Anesthesia Plan(s) and associated risks, benefits, and realistic alternatives discussed. Questions answered and patient/representative(s) expressed understanding.     - Discussed: Risks, Benefits and Alternatives for BOTH SEDATION and the PROCEDURE were discussed     - Discussed with:  Patient      - Extended Intubation/Ventilatory Support Discussed: No.      - Patient is DNR/DNI Status: No    Use of blood products discussed: No .     Postoperative Care            Comments:    Other Comments: The risks and benefits of anesthesia, and the alternatives where applicable, have been discussed with the patient, and they wish to proceed.               SEVEN Thakkar CRNA

## 2023-02-22 DIAGNOSIS — I10 BENIGN ESSENTIAL HYPERTENSION: ICD-10-CM

## 2023-02-22 RX ORDER — AMLODIPINE BESYLATE 5 MG/1
TABLET ORAL
Qty: 90 TABLET | Refills: 2 | Status: SHIPPED | OUTPATIENT
Start: 2023-02-22 | End: 2023-07-20 | Stop reason: SINTOL

## 2023-02-22 NOTE — TELEPHONE ENCOUNTER
Prescription approved per [facility] Refill Protocol.  Sharon Boudreaux RN on 2/22/2023 at 2:18 PM

## 2023-05-23 DIAGNOSIS — I10 BENIGN ESSENTIAL HYPERTENSION: ICD-10-CM

## 2023-05-25 RX ORDER — CHLORTHALIDONE 25 MG/1
25 TABLET ORAL DAILY
Qty: 30 TABLET | Refills: 5 | Status: SHIPPED | OUTPATIENT
Start: 2023-05-25 | End: 2023-07-20

## 2023-05-25 NOTE — TELEPHONE ENCOUNTER
Prescription approved per [facility] Refill Protocol.  Sharon Boudreaux RN on 5/25/2023 at 10:47 AM

## 2023-06-01 ENCOUNTER — HEALTH MAINTENANCE LETTER (OUTPATIENT)
Age: 58
End: 2023-06-01

## 2023-06-12 ENCOUNTER — TELEPHONE (OUTPATIENT)
Dept: FAMILY MEDICINE | Facility: CLINIC | Age: 58
End: 2023-06-12
Payer: COMMERCIAL

## 2023-06-14 NOTE — TELEPHONE ENCOUNTER
"Can be worked in to any open \"provider approval required\" same day spot in next 1-2 weeks for the problem based issues he is having, but will need to schedule prevent visit in next avaialble open slot in July/August, etc.    Will have staff notify patient and schedule.    Minh Benavides MD   "

## 2023-06-21 DIAGNOSIS — I10 BENIGN ESSENTIAL HYPERTENSION: ICD-10-CM

## 2023-06-21 ASSESSMENT — HOOS JR
SITTING: SEVERE
HOOS JR TOTAL INTERVAL SCORE: 70.43
LYING IN BED (TURNING OVER, MAINTAINING HIP POSITION): SEVERE

## 2023-06-21 NOTE — TELEPHONE ENCOUNTER
Pt called pharmacy looking for 90 day refill on losartan but the rx on file appears to be deactivated. Please send new rx if appropriate.     Thank you,  Abel Carrero Pharm.D.  [facility]  [phone]

## 2023-06-22 ENCOUNTER — ANCILLARY PROCEDURE (OUTPATIENT)
Dept: GENERAL RADIOLOGY | Facility: CLINIC | Age: 58
End: 2023-06-22
Attending: ORTHOPAEDIC SURGERY
Payer: COMMERCIAL

## 2023-06-22 ENCOUNTER — OFFICE VISIT (OUTPATIENT)
Dept: ORTHOPEDICS | Facility: CLINIC | Age: 58
End: 2023-06-22
Payer: COMMERCIAL

## 2023-06-22 VITALS
WEIGHT: 208 LBS | DIASTOLIC BLOOD PRESSURE: 92 MMHG | SYSTOLIC BLOOD PRESSURE: 145 MMHG | HEIGHT: 66 IN | BODY MASS INDEX: 33.43 KG/M2 | HEART RATE: 76 BPM | RESPIRATION RATE: 18 BRPM

## 2023-06-22 DIAGNOSIS — M16.11 PRIMARY OSTEOARTHRITIS OF RIGHT HIP: ICD-10-CM

## 2023-06-22 DIAGNOSIS — M16.11 OSTEOARTHRITIS OF RIGHT HIP, UNSPECIFIED OSTEOARTHRITIS TYPE: ICD-10-CM

## 2023-06-22 DIAGNOSIS — M16.0 PRIMARY OSTEOARTHRITIS OF BOTH HIPS: Primary | ICD-10-CM

## 2023-06-22 PROCEDURE — 99213 OFFICE O/P EST LOW 20 MIN: CPT | Performed by: ORTHOPAEDIC SURGERY

## 2023-06-22 PROCEDURE — 73502 X-RAY EXAM HIP UNI 2-3 VIEWS: CPT | Mod: TC | Performed by: RADIOLOGY

## 2023-06-22 NOTE — PATIENT INSTRUCTIONS
Da to follow up with Primary Care provider regarding elevated blood pressure.    [facility] Scheduling    [address] [address]    Please call [phone] to schedule this test.     **If joint injection, you will only need a follow up if problems occur unless directed by your orthopedic doctor to return.   It can take up to 2 weeks to begin feeling benefits of this injection.

## 2023-06-22 NOTE — TELEPHONE ENCOUNTER
Called and LM for patient to call back. Please relay below and gather information needed and help schedule appointment.   Tessa Han MA

## 2023-06-22 NOTE — LETTER
6/22/2023         RE: Da Akins  [address] [facility] [address]        Dear Colleague,    Thank you for referring your patient, Da Akins, to the [facility]. Please see a copy of my visit note below.    Da Akins is a 58 year old male who is seen in follow up for bilateral hip pains.  He has had this for approximately 5 years.  It hurts if he has been sitting too long and then gets up.  Walking hurts.  He describes occasional shooting pains rated 1-3 out of 10.  He has had steroid injections.  These gave fair relief.  He has not used anti-inflammatories.  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    X-ray done on 5/27/2022 and compared to x-rays from 2017 show bilateral femoral acetabular impingement with cam affect.  There is also moderate arthritis but still joint space preservation.  Hip is slightly worse on the right than the left, but his pain is much worse on the right than the left.  X-ray 6/22/23 shows no significant change from 5/27/22.    Past Medical History:   Diagnosis Date     Healthy adult      Seasonal allergies        Past Surgical History:   Procedure Laterality Date     ANKLE SURGERY       COLONOSCOPY N/A 1/23/2023    Procedure: COLONOSCOPY;  Surgeon: Jose Montanez MD;  Location: [facility]     gnaglion cyst remove Right        Family History   Problem Relation Age of Onset     Diabetes Father      Hypertension Father      Cerebrovascular Disease Father      Colon Cancer Father 50     Colon Cancer Mother 72     Cerebrovascular Disease Maternal Grandfather      Coronary Artery Disease No family hx of      Hyperlipidemia No family hx of        Social History     Socioeconomic History     Marital status: Single     Spouse name: Not on file     Number of children: 3     Years of education: Not on file     Highest education level: Not on file   Occupational History     Not on file   Tobacco Use     Smoking  status: Never     Smokeless tobacco: Never   Vaping Use     Vaping Use: Never used   Substance and Sexual Activity     Alcohol use: Yes     Drug use: No     Sexual activity: Yes     Partners: Female     Birth control/protection: Male Surgical     Comment: 3 children.   Other Topics Concern     Parent/sibling w/ CABG, MI or angioplasty before 65F 55M? Not Asked   Social History Narrative     Not on file     Social Determinants of Health     Financial Resource Strain: Not on file   Food Insecurity: Not on file   Transportation Needs: Not on file   Physical Activity: Not on file   Stress: Not on file   Social Connections: Not on file   Intimate Partner Violence: Not on file   Housing Stability: Not on file       Current Outpatient Medications   Medication Sig Dispense Refill     amLODIPine (NORVASC) 5 MG tablet TAKE ONE TABLET BY MOUTH ONCE DAILY 90 tablet 2     bisacodyl (DULCOLAX) 5 MG EC tablet Take 2 tablets at 3 pm the day before your procedure. If your procedure is before 11 am, take 2 additional tablets at 11 pm. If your procedure is after 11 am, take 2 additional tablets at 6 am. For additional instructions refer to your colonoscopy prep instructions. 4 tablet 0     chlorthalidone (HYGROTON) 25 MG tablet TAKE 1 TABLET (25 MG) BY MOUTH DAILY 30 tablet 5     diclofenac (VOLTAREN) 75 MG EC tablet Take 1 tablet (75 mg) by mouth 2 times daily 60 tablet 11     ketoconazole (NIZORAL) 2 % external cream Apply topically daily 60 g 3     losartan (COZAAR) 100 MG tablet Take 1 tablet (100 mg) by mouth daily 90 tablet 4     polyethylene glycol (GOLYTELY) 236 g suspension The night before the exam at 6 pm drink an 8-ounce glass every 15 minutes until the jug is half empty. If you arrive before 11 AM: Drink the other half of the Golytely jug at 11 PM night before procedure. If you arrive after 11 AM: Drink the other half of the Golytely jug at 6 AM day of procedure. For additional instructions refer to your colonoscopy prep  "instructions. 4000 mL 0     triamcinolone (KENALOG) 0.5 % external ointment APPLY TOPICALLY TO AFFECTED AREA(S) TWICE DAILY         Allergies   Allergen Reactions     Seasonal Allergies        REVIEW OF SYSTEMS:  CONSTITUTIONAL:  NEGATIVE for fever, chills, change in weight, not feeling tired  SKIN:  NEGATIVE for worrisome rashes, no skin lumps, no skin ulcers and no non-healing wounds  EYES:  NEGATIVE for vision changes or irritation.  ENT/MOUTH:  NEGATIVE.  No hearing loss, no hoarseness, no difficulty swallowing.  RESP:  NEGATIVE. No cough or shortness of breath.  CV:  NEGATIVE for chest pain, palpitations or peripheral edema  GI:  NEGATIVE for nausea, abdominal pain, heartburn, or change in bowel habits  :  Negative. No dysuria, no hematuria  MUSCULOSKELETAL:  See HPI above  NEURO:  NEGATIVE . No headaches, no dizziness,  no numbness  ENDOCRINE:  NEGATIVE for temperature intolerance, skin/hair changes  HEME/ALLERGY/IMMUNE:  NEGATIVE for bleeding problems  PSYCHIATRIC:  NEGATIVE. no anxiety, no depression.     Exam:  Vitals: BP (!) 145/92   Pulse 76   Resp 18   Ht 1.676 m (5' 6\")   Wt 94.3 kg (208 lb)   BMI 33.57 kg/m    BMI= Body mass index is 33.57 kg/m .  Constitutional:  healthy, alert and no distress  Neuro: Alert and Oriented x 3, no focal defects.  Psych: Affect normal   Respiratory: Breathing not labored.  Cardiovascular: normal peripheral pulses  Lymph: no adenopathy  Skin: No rashes,worrisome lesions or skin problems  He has no pain across the low back.  Right hip shows 20 degrees external rotation and 0 degrees internal rotation.  Left hip shows 45 degrees external rotation and 15 degrees internal rotation.  He has pain mainly on the right with the internal rotation.  He has no tenderness over the greater trochanters.  Sensation, motor and circulation are intact.    Assessment:  Bilateral hip osteoarthritis due to femoral acetabular impingement.    Plan: refer for right hip injection.  Prior has " been with radiology.  Eventually total hip arthroplasty.  Loss of rotation is most likely to be the thing that pushes us to replace the hips.  Weight loss advised, as total hip arthroplasty will be easier for him with direct anterior approach.      Again, thank you for allowing me to participate in the care of your patient.        Sincerely,        Ronald Vieyra MD

## 2023-06-22 NOTE — TELEPHONE ENCOUNTER
Sending to staff to clarify with patient what medication he is needing, also check and see if he can schedule follow-up appointment with me scheduled in the next 3-4 months.      Minh Benavides MD

## 2023-06-22 NOTE — PROGRESS NOTES
Da Akins is a 58 year old male who is seen in follow up for bilateral hip pains.  He has had this for approximately 5 years.  It hurts if he has been sitting too long and then gets up.  Walking hurts.  He describes occasional shooting pains rated 1-3 out of 10.  He has had steroid injections.  These gave fair relief.  He has not used anti-inflammatories.  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    X-ray done on 5/27/2022 and compared to x-rays from 2017 show bilateral femoral acetabular impingement with cam affect.  There is also moderate arthritis but still joint space preservation.  Hip is slightly worse on the right than the left, but his pain is much worse on the right than the left.  X-ray 6/22/23 shows no significant change from 5/27/22.    Past Medical History:   Diagnosis Date     Healthy adult      Seasonal allergies        Past Surgical History:   Procedure Laterality Date     ANKLE SURGERY       COLONOSCOPY N/A 1/23/2023    Procedure: COLONOSCOPY;  Surgeon: Jose Montanez MD;  Location: [facility]     gnaglion cyst remove Right        Family History   Problem Relation Age of Onset     Diabetes Father      Hypertension Father      Cerebrovascular Disease Father      Colon Cancer Father 50     Colon Cancer Mother 72     Cerebrovascular Disease Maternal Grandfather      Coronary Artery Disease No family hx of      Hyperlipidemia No family hx of        Social History     Socioeconomic History     Marital status: Single     Spouse name: Not on file     Number of children: 3     Years of education: Not on file     Highest education level: Not on file   Occupational History     Not on file   Tobacco Use     Smoking status: Never     Smokeless tobacco: Never   Vaping Use     Vaping Use: Never used   Substance and Sexual Activity     Alcohol use: Yes     Drug use: No     Sexual activity: Yes     Partners: Female     Birth control/protection: Male Surgical      Comment: 3 children.   Other Topics Concern     Parent/sibling w/ CABG, MI or angioplasty before 65F 55M? Not Asked   Social History Narrative     Not on file     Social Determinants of Health     Financial Resource Strain: Not on file   Food Insecurity: Not on file   Transportation Needs: Not on file   Physical Activity: Not on file   Stress: Not on file   Social Connections: Not on file   Intimate Partner Violence: Not on file   Housing Stability: Not on file       Current Outpatient Medications   Medication Sig Dispense Refill     amLODIPine (NORVASC) 5 MG tablet TAKE ONE TABLET BY MOUTH ONCE DAILY 90 tablet 2     bisacodyl (DULCOLAX) 5 MG EC tablet Take 2 tablets at 3 pm the day before your procedure. If your procedure is before 11 am, take 2 additional tablets at 11 pm. If your procedure is after 11 am, take 2 additional tablets at 6 am. For additional instructions refer to your colonoscopy prep instructions. 4 tablet 0     chlorthalidone (HYGROTON) 25 MG tablet TAKE 1 TABLET (25 MG) BY MOUTH DAILY 30 tablet 5     diclofenac (VOLTAREN) 75 MG EC tablet Take 1 tablet (75 mg) by mouth 2 times daily 60 tablet 11     ketoconazole (NIZORAL) 2 % external cream Apply topically daily 60 g 3     losartan (COZAAR) 100 MG tablet Take 1 tablet (100 mg) by mouth daily 90 tablet 4     polyethylene glycol (GOLYTELY) 236 g suspension The night before the exam at 6 pm drink an 8-ounce glass every 15 minutes until the jug is half empty. If you arrive before 11 AM: Drink the other half of the Golytely jug at 11 PM night before procedure. If you arrive after 11 AM: Drink the other half of the Golytely jug at 6 AM day of procedure. For additional instructions refer to your colonoscopy prep instructions. 4000 mL 0     triamcinolone (KENALOG) 0.5 % external ointment APPLY TOPICALLY TO AFFECTED AREA(S) TWICE DAILY         Allergies   Allergen Reactions     Seasonal Allergies        REVIEW OF SYSTEMS:  CONSTITUTIONAL:  NEGATIVE for  "fever, chills, change in weight, not feeling tired  SKIN:  NEGATIVE for worrisome rashes, no skin lumps, no skin ulcers and no non-healing wounds  EYES:  NEGATIVE for vision changes or irritation.  ENT/MOUTH:  NEGATIVE.  No hearing loss, no hoarseness, no difficulty swallowing.  RESP:  NEGATIVE. No cough or shortness of breath.  CV:  NEGATIVE for chest pain, palpitations or peripheral edema  GI:  NEGATIVE for nausea, abdominal pain, heartburn, or change in bowel habits  :  Negative. No dysuria, no hematuria  MUSCULOSKELETAL:  See HPI above  NEURO:  NEGATIVE . No headaches, no dizziness,  no numbness  ENDOCRINE:  NEGATIVE for temperature intolerance, skin/hair changes  HEME/ALLERGY/IMMUNE:  NEGATIVE for bleeding problems  PSYCHIATRIC:  NEGATIVE. no anxiety, no depression.     Exam:  Vitals: BP (!) 145/92   Pulse 76   Resp 18   Ht 1.676 m (5' 6\")   Wt 94.3 kg (208 lb)   BMI 33.57 kg/m    BMI= Body mass index is 33.57 kg/m .  Constitutional:  healthy, alert and no distress  Neuro: Alert and Oriented x 3, no focal defects.  Psych: Affect normal   Respiratory: Breathing not labored.  Cardiovascular: normal peripheral pulses  Lymph: no adenopathy  Skin: No rashes,worrisome lesions or skin problems  He has no pain across the low back.  Right hip shows 20 degrees external rotation and 0 degrees internal rotation.  Left hip shows 45 degrees external rotation and 15 degrees internal rotation.  He has pain mainly on the right with the internal rotation.  He has no tenderness over the greater trochanters.  Sensation, motor and circulation are intact.    Assessment:  Bilateral hip osteoarthritis due to femoral acetabular impingement.    Plan: refer for right hip injection.  Prior has been with radiology.  Eventually total hip arthroplasty.  Loss of rotation is most likely to be the thing that pushes us to replace the hips.  Weight loss advised, as total hip arthroplasty will be easier for him with direct anterior " approach.

## 2023-06-23 RX ORDER — LOSARTAN POTASSIUM 100 MG/1
100 TABLET ORAL DAILY
Qty: 90 TABLET | Refills: 0 | Status: SHIPPED | OUTPATIENT
Start: 2023-06-23 | End: 2023-07-20

## 2023-06-23 NOTE — TELEPHONE ENCOUNTER
Patient returned call. Patient is needing his Losartan refilled. Patient is scheduled for a physical on 7/20 with Dr. Benavides. Gema Meraz LPN

## 2023-06-23 NOTE — TELEPHONE ENCOUNTER
Routing refill request to provider for review/approval because:    Requested Prescriptions   Pending Prescriptions Disp Refills    losartan (COZAAR) 100 MG tablet 90 tablet 4     Sig: Take 1 tablet (100 mg) by mouth daily       Angiotensin-II Receptors Failed - 6/23/2023  1:11 PM        Failed - Last blood pressure under 140/90 in past 12 months     BP Readings from Last 3 Encounters:   06/22/23 (!) 145/92   01/23/23 131/83   12/12/22 136/88

## 2023-06-28 ENCOUNTER — HOSPITAL ENCOUNTER (OUTPATIENT)
Dept: GENERAL RADIOLOGY | Facility: CLINIC | Age: 58
Discharge: HOME | End: 2023-06-28
Attending: ORTHOPAEDIC SURGERY | Admitting: ORTHOPAEDIC SURGERY
Payer: COMMERCIAL

## 2023-06-28 DIAGNOSIS — M16.11 PRIMARY OSTEOARTHRITIS OF RIGHT HIP: ICD-10-CM

## 2023-06-28 PROCEDURE — 250N000011 HC RX IP 250 OP 636: Mod: JZ | Performed by: INTERNAL MEDICINE

## 2023-06-28 PROCEDURE — 77002 NEEDLE LOCALIZATION BY XRAY: CPT

## 2023-06-28 PROCEDURE — 255N000002 HC RX 255 OP 636: Mod: JZ | Performed by: INTERNAL MEDICINE

## 2023-06-28 PROCEDURE — 250N000009 HC RX 250: Performed by: INTERNAL MEDICINE

## 2023-06-28 RX ORDER — TRIAMCINOLONE ACETONIDE 40 MG/ML
40 INJECTION, SUSPENSION INTRA-ARTICULAR; INTRAMUSCULAR ONCE
Status: COMPLETED | OUTPATIENT
Start: 2023-06-28 | End: 2023-06-28

## 2023-06-28 RX ORDER — BUPIVACAINE HYDROCHLORIDE 2.5 MG/ML
10 INJECTION, SOLUTION EPIDURAL; INFILTRATION; INTRACAUDAL ONCE
Status: COMPLETED | OUTPATIENT
Start: 2023-06-28 | End: 2023-06-28

## 2023-06-28 RX ORDER — IOPAMIDOL 510 MG/ML
100 INJECTION, SOLUTION INTRAVASCULAR ONCE
Status: COMPLETED | OUTPATIENT
Start: 2023-06-28 | End: 2023-06-28

## 2023-06-28 RX ORDER — LIDOCAINE HYDROCHLORIDE 10 MG/ML
5 INJECTION, SOLUTION EPIDURAL; INFILTRATION; INTRACAUDAL; PERINEURAL ONCE
Status: COMPLETED | OUTPATIENT
Start: 2023-06-28 | End: 2023-06-28

## 2023-06-28 RX ADMIN — BUPIVACAINE HYDROCHLORIDE 3 ML: 2.5 INJECTION, SOLUTION EPIDURAL; INFILTRATION; INTRACAUDAL; PERINEURAL at 10:19

## 2023-06-28 RX ADMIN — IOPAMIDOL 3 ML: 510 INJECTION, SOLUTION INTRAVASCULAR at 10:19

## 2023-06-28 RX ADMIN — TRIAMCINOLONE ACETONIDE 40 MG: 40 INJECTION, SUSPENSION INTRA-ARTICULAR; INTRAMUSCULAR at 10:19

## 2023-06-28 RX ADMIN — LIDOCAINE HYDROCHLORIDE 3 ML: 10 INJECTION, SOLUTION EPIDURAL; INFILTRATION; INTRACAUDAL; PERINEURAL at 10:16

## 2023-07-01 ENCOUNTER — TRANSFERRED RECORDS (OUTPATIENT)
Dept: MULTI SPECIALTY CLINIC | Facility: CLINIC | Age: 58
End: 2023-07-01

## 2023-07-01 LAB — RETINOPATHY: NEGATIVE

## 2023-07-17 ASSESSMENT — ENCOUNTER SYMPTOMS
FEVER: 0
COUGH: 0
CHILLS: 0
HEMATOCHEZIA: 0
SHORTNESS OF BREATH: 0
DIARRHEA: 0
MYALGIAS: 0
ABDOMINAL PAIN: 0
DYSURIA: 0
ARTHRALGIAS: 0
CONSTIPATION: 0
WEAKNESS: 0
SORE THROAT: 0
FREQUENCY: 0
JOINT SWELLING: 0
HEADACHES: 0
HEARTBURN: 0
NAUSEA: 0
EYE PAIN: 0
DIZZINESS: 0
NERVOUS/ANXIOUS: 0
PARESTHESIAS: 0
PALPITATIONS: 0
HEMATURIA: 0

## 2023-07-20 ENCOUNTER — OFFICE VISIT (OUTPATIENT)
Dept: FAMILY MEDICINE | Facility: CLINIC | Age: 58
End: 2023-07-20
Payer: COMMERCIAL

## 2023-07-20 VITALS
TEMPERATURE: 97 F | OXYGEN SATURATION: 97 % | BODY MASS INDEX: 32.81 KG/M2 | HEART RATE: 68 BPM | HEIGHT: 66 IN | WEIGHT: 204.13 LBS | RESPIRATION RATE: 18 BRPM | DIASTOLIC BLOOD PRESSURE: 86 MMHG | SYSTOLIC BLOOD PRESSURE: 132 MMHG

## 2023-07-20 DIAGNOSIS — E78.2 MIXED HYPERLIPIDEMIA: ICD-10-CM

## 2023-07-20 DIAGNOSIS — R73.03 PREDIABETES: ICD-10-CM

## 2023-07-20 DIAGNOSIS — E11.65 TYPE 2 DIABETES MELLITUS WITH HYPERGLYCEMIA, WITHOUT LONG-TERM CURRENT USE OF INSULIN (H): ICD-10-CM

## 2023-07-20 DIAGNOSIS — I10 BENIGN ESSENTIAL HYPERTENSION: ICD-10-CM

## 2023-07-20 DIAGNOSIS — B35.6 TINEA CRURIS: ICD-10-CM

## 2023-07-20 DIAGNOSIS — E11.65 TYPE 2 DIABETES MELLITUS WITH HYPERGLYCEMIA, WITHOUT LONG-TERM CURRENT USE OF INSULIN (H): Primary | ICD-10-CM

## 2023-07-20 DIAGNOSIS — Z00.00 ROUTINE GENERAL MEDICAL EXAMINATION AT A HEALTH CARE FACILITY: Primary | ICD-10-CM

## 2023-07-20 LAB
ANION GAP SERPL CALCULATED.3IONS-SCNC: 11 MMOL/L (ref 7–15)
BUN SERPL-MCNC: 16.4 MG/DL (ref 6–20)
CALCIUM SERPL-MCNC: 9.4 MG/DL (ref 8.6–10)
CHLORIDE SERPL-SCNC: 100 MMOL/L (ref 98–107)
CHOLEST SERPL-MCNC: 240 MG/DL
CREAT SERPL-MCNC: 0.92 MG/DL (ref 0.67–1.17)
CREAT UR-MCNC: 194.2 MG/DL
DEPRECATED HCO3 PLAS-SCNC: 24 MMOL/L (ref 22–29)
GFR SERPL CREATININE-BSD FRML MDRD: >90 ML/MIN/1.73M2
GLUCOSE SERPL-MCNC: 205 MG/DL (ref 70–99)
HBA1C MFR BLD: 8.5 %
HDLC SERPL-MCNC: 39 MG/DL
LDLC SERPL CALC-MCNC: ABNORMAL MG/DL
LDLC SERPL DIRECT ASSAY-MCNC: 120 MG/DL
MICROALBUMIN UR-MCNC: 13 MG/L
MICROALBUMIN/CREAT UR: 6.69 MG/G CR (ref 0–17)
NONHDLC SERPL-MCNC: 201 MG/DL
POTASSIUM SERPL-SCNC: 4.5 MMOL/L (ref 3.4–5.3)
SODIUM SERPL-SCNC: 135 MMOL/L (ref 136–145)
TRIGL SERPL-MCNC: 579 MG/DL

## 2023-07-20 PROCEDURE — 82043 UR ALBUMIN QUANTITATIVE: CPT | Performed by: FAMILY MEDICINE

## 2023-07-20 PROCEDURE — 83721 ASSAY OF BLOOD LIPOPROTEIN: CPT | Mod: 59 | Performed by: FAMILY MEDICINE

## 2023-07-20 PROCEDURE — 99396 PREV VISIT EST AGE 40-64: CPT | Mod: 25 | Performed by: FAMILY MEDICINE

## 2023-07-20 PROCEDURE — 99214 OFFICE O/P EST MOD 30 MIN: CPT | Mod: 25 | Performed by: FAMILY MEDICINE

## 2023-07-20 PROCEDURE — 80061 LIPID PANEL: CPT | Performed by: FAMILY MEDICINE

## 2023-07-20 PROCEDURE — 80048 BASIC METABOLIC PNL TOTAL CA: CPT | Performed by: FAMILY MEDICINE

## 2023-07-20 PROCEDURE — 83036 HEMOGLOBIN GLYCOSYLATED A1C: CPT | Performed by: FAMILY MEDICINE

## 2023-07-20 PROCEDURE — 82570 ASSAY OF URINE CREATININE: CPT | Performed by: FAMILY MEDICINE

## 2023-07-20 PROCEDURE — 36415 COLL VENOUS BLD VENIPUNCTURE: CPT | Performed by: FAMILY MEDICINE

## 2023-07-20 RX ORDER — SPIRONOLACTONE 25 MG/1
25 TABLET ORAL DAILY
Qty: 30 TABLET | Refills: 1 | Status: SHIPPED | OUTPATIENT
Start: 2023-07-20 | End: 2023-08-21

## 2023-07-20 RX ORDER — KETOCONAZOLE 20 MG/G
CREAM TOPICAL DAILY
Qty: 60 G | Refills: 3 | Status: SHIPPED | OUTPATIENT
Start: 2023-07-20 | End: 2024-02-08

## 2023-07-20 RX ORDER — CHLORTHALIDONE 25 MG/1
25 TABLET ORAL DAILY
Qty: 90 TABLET | Refills: 4 | Status: SHIPPED | OUTPATIENT
Start: 2023-07-20 | End: 2024-02-08

## 2023-07-20 RX ORDER — LOSARTAN POTASSIUM 100 MG/1
100 TABLET ORAL DAILY
Qty: 90 TABLET | Refills: 4 | Status: SHIPPED | OUTPATIENT
Start: 2023-07-20 | End: 2024-02-08

## 2023-07-20 ASSESSMENT — ENCOUNTER SYMPTOMS
FEVER: 0
MYALGIAS: 0
DIZZINESS: 0
HEMATOCHEZIA: 0
HEARTBURN: 0
CHILLS: 0
WEAKNESS: 0
SHORTNESS OF BREATH: 0
DIARRHEA: 0
NERVOUS/ANXIOUS: 0
JOINT SWELLING: 0
NAUSEA: 0
PALPITATIONS: 0
ARTHRALGIAS: 0
COUGH: 0
DYSURIA: 0
HEMATURIA: 0
EYE PAIN: 0
ABDOMINAL PAIN: 0
SORE THROAT: 0
FREQUENCY: 0
HEADACHES: 0
PARESTHESIAS: 0
CONSTIPATION: 0

## 2023-07-20 NOTE — PROGRESS NOTES
SUBJECTIVE:   CC: Da is an 58 year old who presents for preventative health visit.       7/20/2023    11:07 AM   Additional Questions   Roomed by Sonja COLLINS MA     Healthy Habits:     Getting at least 3 servings of Calcium per day:  NO    Bi-annual eye exam:  NO    Dental care twice a year:  Yes    Sleep apnea or symptoms of sleep apnea:  None    Diet:  Breakfast skipped    Frequency of exercise:  2-3 days/week    Duration of exercise:  Less than 15 minutes    Taking medications regularly:  Yes    Medication side effects:  None    Additional concerns today:  No    History of prediabetes and needs recheck on this today.    History of hypertension.  Has been stable in past, but blood pressure eleavted today.    Today's PHQ-2 Score:       7/19/2023     7:59 PM   PHQ-2 ( 1999 [employer])   Q1: Little interest or pleasure in doing things 0   Q2: Feeling down, depressed or hopeless 0   PHQ-2 Score 0   Q1: Little interest or pleasure in doing things Not at all   Q2: Feeling down, depressed or hopeless Not at all   PHQ-2 Score 0                   Have you ever done Advance Care Planning? (For example, a Health Directive, POLST, or a discussion with a medical provider or your loved ones about your wishes): No, advance care planning information given to patient to review.  Patient plans to discuss their wishes with loved ones or provider.      Social History     Tobacco Use     Smoking status: Never     Smokeless tobacco: Never   Substance Use Topics     Alcohol use: Yes             7/17/2023     8:26 PM   Alcohol Use   Prescreen: >3 drinks/day or >7 drinks/week? No       Last PSA:   PSA   Date Value Ref Range Status   10/11/2017 2.68 0 - 4 ug/L Final     Comment:     Assay Method:  Chemiluminescence using Siemens Vista analyzer       Reviewed orders with patient. Reviewed health maintenance and updated orders accordingly - Yes      Reviewed and updated as needed this visit by clinical staff   Tobacco  Allergies  Meds     "          Reviewed and updated as needed this visit by Provider                     Review of Systems   Constitutional:  Negative for chills and fever.   HENT:  Negative for congestion, ear pain, hearing loss and sore throat.    Eyes:  Negative for pain and visual disturbance.   Respiratory:  Negative for cough and shortness of breath.    Cardiovascular:  Positive for peripheral edema. Negative for chest pain and palpitations.   Gastrointestinal:  Negative for abdominal pain, constipation, diarrhea, heartburn, hematochezia and nausea.   Genitourinary:  Negative for dysuria, frequency, genital sores, hematuria, impotence, penile discharge and urgency.   Musculoskeletal:  Negative for arthralgias, joint swelling and myalgias.   Skin:  Negative for rash.   Neurological:  Negative for dizziness, weakness, headaches and paresthesias.   Psychiatric/Behavioral:  Negative for mood changes. The patient is not nervous/anxious.          OBJECTIVE:   /86   Pulse 68   Temp 97  F (36.1  C) (Temporal)   Resp 18   Ht 1.676 m (5' 6\")   Wt 92.6 kg (204 lb 2 oz)   SpO2 97%   BMI 32.95 kg/m      Physical Exam  Constitutional:       General: He is not in acute distress.     Appearance: He is well-developed.   HENT:      Head: Normocephalic and atraumatic.      Right Ear: Hearing, tympanic membrane, ear canal and external ear normal.      Left Ear: Hearing, tympanic membrane, ear canal and external ear normal.      Nose: Nose normal.      Mouth/Throat:      Mouth: No oral lesions.      Pharynx: Uvula midline. No oropharyngeal exudate.   Eyes:      General: Lids are normal. No scleral icterus.        Right eye: No discharge.         Left eye: No discharge.      Extraocular Movements: Extraocular movements intact.      Conjunctiva/sclera: Conjunctivae normal.      Pupils: Pupils are equal, round, and reactive to light.   Neck:      Thyroid: No thyroid mass or thyromegaly.      Trachea: No tracheal deviation.   Cardiovascular: "      Rate and Rhythm: Normal rate and regular rhythm.      Pulses: Normal pulses.      Heart sounds: Normal heart sounds, S1 normal and S2 normal. No murmur heard.     No S3 or S4 sounds.   Pulmonary:      Effort: Pulmonary effort is normal. No respiratory distress.      Breath sounds: Normal breath sounds. No wheezing or rales.   Abdominal:      General: Bowel sounds are normal. There is no distension.      Palpations: Abdomen is soft. There is no mass.      Tenderness: There is no abdominal tenderness. There is no guarding.   Genitourinary:     Comments: Groin rash consistent with tinea cruis noted.  Musculoskeletal:         General: No deformity. Normal range of motion.      Cervical back: Normal range of motion and neck supple.      Right lower le+ Pitting Edema present.      Left lower le+ Pitting Edema present.   Lymphadenopathy:      Cervical: No cervical adenopathy.      Upper Body:      Right upper body: No supraclavicular adenopathy.      Left upper body: No supraclavicular adenopathy.   Skin:     General: Skin is warm and dry.      Findings: No lesion or rash.   Neurological:      Mental Status: He is alert and oriented to person, place, and time.      Motor: No abnormal muscle tone.      Deep Tendon Reflexes: Reflexes are normal and symmetric.   Psychiatric:         Speech: Speech normal.         Thought Content: Thought content normal.         Judgment: Judgment normal.               ASSESSMENT/PLAN:   ASSESSMENT/ORDERS:    ICD-10-CM    1. Routine general medical examination at a health care facility  Z00.00       2. Type 2 diabetes mellitus with hyperglycemia, without long-term current use of insulin (H)  E11.65       3. Mixed hyperlipidemia  E78.2       4. Benign essential hypertension  I10 chlorthalidone (HYGROTON) 25 MG tablet     losartan (COZAAR) 100 MG tablet     spironolactone (ALDACTONE) 25 MG tablet     Albumin Random Urine Quantitative with Creat Ratio     Lipid panel reflex to direct  "LDL Fasting     Basic metabolic panel  (Ca, Cl, CO2, Creat, Gluc, K, Na, BUN)     Albumin Random Urine Quantitative with Creat Ratio     Lipid panel reflex to direct LDL Fasting     Basic metabolic panel  (Ca, Cl, CO2, Creat, Gluc, K, Na, BUN)     LDL cholesterol direct      5. Prediabetes  R73.03 Hemoglobin A1c     Hemoglobin A1c      6. Tinea cruris  B35.6 ketoconazole (NIZORAL) 2 % external cream        PLAN:  1.  Blood pressure elevated:  Follow-up in 2 weeks for nurse blood pressure recheck.  If still elevated, follow-up with me for in-person office visit to recheck blood pressure and discuss potential treatment.   2.  History of elevated blood sugar. Needs hemoglobin A1c today.  After his visit and on same day, I reviewed hemoglobin A1c and found it to be   Lab Results   Component Value Date    A1C 8.5 07/20/2023    A1C 6.4 06/09/2022    A1C 6.2 11/08/2018   He was contacted via [facility] and diabetic ed ordered to review lifestyle management and blood sugar checking.  He will follow-up with me soon to discuss other diabetic managmeent needs.  3.  Topical antifungal for tinea cruis.          COUNSELING:   Reviewed preventive health counseling, as reflected in patient instructions      BMI:   Estimated body mass index is 32.95 kg/m  as calculated from the following:    Height as of this encounter: 1.676 m (5' 6\").    Weight as of this encounter: 92.6 kg (204 lb 2 oz).   Weight management plan: Discussed healthy diet and exercise guidelines      He reports that he has never smoked. He has never used smokeless tobacco.            Minh Benavides MD  [facility]  "

## 2023-07-21 ENCOUNTER — TELEPHONE (OUTPATIENT)
Dept: FAMILY MEDICINE | Facility: CLINIC | Age: 58
End: 2023-07-21

## 2023-07-21 NOTE — TELEPHONE ENCOUNTER
Diabetes Education Scheduling Outreach #1:    Call to patient to schedule. Left message with phone number to call to schedule.    Plan for 2nd outreach attempt within 2 business days.    Shandra Cardona OnCall  Diabetes and Nutrition Scheduling

## 2023-07-21 NOTE — RESULT ENCOUNTER NOTE
Da,  Your results show you have diabetes.  I have placed a referral for our diabetic educator to reach out to you to schedule an appointment with them to review the diagnosis, discuss how to check blood sugars.  You can see them and then we can discuss all of this in more detail at your next visit with me in August.    Your other results are either normal or reflect your diabetes.  Please let me know if you have any questions.    Sincerely,  Dr. Benavides

## 2023-07-27 ENCOUNTER — LAB (OUTPATIENT)
Dept: LAB | Facility: CLINIC | Age: 58
End: 2023-07-27
Payer: COMMERCIAL

## 2023-07-27 ENCOUNTER — ALLIED HEALTH/NURSE VISIT (OUTPATIENT)
Dept: FAMILY MEDICINE | Facility: CLINIC | Age: 58
End: 2023-07-27
Payer: COMMERCIAL

## 2023-07-27 VITALS — DIASTOLIC BLOOD PRESSURE: 82 MMHG | SYSTOLIC BLOOD PRESSURE: 126 MMHG

## 2023-07-27 DIAGNOSIS — I10 BENIGN ESSENTIAL HYPERTENSION: Primary | ICD-10-CM

## 2023-07-27 DIAGNOSIS — I10 BENIGN ESSENTIAL HYPERTENSION: ICD-10-CM

## 2023-07-27 LAB
ANION GAP SERPL CALCULATED.3IONS-SCNC: 11 MMOL/L (ref 7–15)
BUN SERPL-MCNC: 28.2 MG/DL (ref 6–20)
CALCIUM SERPL-MCNC: 9.2 MG/DL (ref 8.6–10)
CHLORIDE SERPL-SCNC: 100 MMOL/L (ref 98–107)
CREAT SERPL-MCNC: 1.1 MG/DL (ref 0.67–1.17)
DEPRECATED HCO3 PLAS-SCNC: 23 MMOL/L (ref 22–29)
GFR SERPL CREATININE-BSD FRML MDRD: 79 ML/MIN/1.73M2
GLUCOSE SERPL-MCNC: 196 MG/DL (ref 70–99)
POTASSIUM SERPL-SCNC: 4 MMOL/L (ref 3.4–5.3)
SODIUM SERPL-SCNC: 134 MMOL/L (ref 136–145)

## 2023-07-27 PROCEDURE — 36415 COLL VENOUS BLD VENIPUNCTURE: CPT

## 2023-07-27 PROCEDURE — 80048 BASIC METABOLIC PNL TOTAL CA: CPT

## 2023-07-27 PROCEDURE — 99207 PR NO CHARGE NURSE ONLY: CPT

## 2023-07-27 NOTE — PROGRESS NOTES
Da Akins is a 58 year old patient who comes in today for a Blood Pressure check.  Initial BP:  /82      Data Unavailable  Disposition: follow-up as previously indicated by provider    Emma Quiles MA 7/27/2023

## 2023-07-28 NOTE — RESULT ENCOUNTER NOTE
Da,  Your results are stable from last check.  Please let me know if you have any questions.    Sincerely,  Dr. Benavides

## 2023-07-29 PROBLEM — E11.65 TYPE 2 DIABETES MELLITUS WITH HYPERGLYCEMIA, WITHOUT LONG-TERM CURRENT USE OF INSULIN (H): Status: ACTIVE | Noted: 2023-07-29

## 2023-07-29 PROBLEM — Z83.3 FAMILY HISTORY OF DIABETES MELLITUS: Status: RESOLVED | Noted: 2018-10-23 | Resolved: 2023-07-29

## 2023-07-29 PROBLEM — R73.03 PREDIABETES: Status: RESOLVED | Noted: 2022-06-16 | Resolved: 2023-07-29

## 2023-08-15 DIAGNOSIS — L23.7 CONTACT DERMATITIS DUE TO POISON IVY: ICD-10-CM

## 2023-08-16 RX ORDER — PREDNISONE 10 MG/1
TABLET ORAL
Qty: 42 TABLET | Refills: 0 | Status: SHIPPED | OUTPATIENT
Start: 2023-08-16 | End: 2024-04-19

## 2023-08-16 NOTE — TELEPHONE ENCOUNTER
MA called patient. Left message on answering machine for patient to call back.      Yoli Voss MA

## 2023-08-16 NOTE — TELEPHONE ENCOUNTER
Predniosone refilled.  Please confirm he needs this and that it is for poison kanchan.    Minh Benavides MD

## 2023-08-16 NOTE — TELEPHONE ENCOUNTER
Patient is needing this for Poison Ivy.  He has run out and has poison kanchan again.    Sariah Tello XRO/

## 2023-08-21 ENCOUNTER — OFFICE VISIT (OUTPATIENT)
Dept: FAMILY MEDICINE | Facility: CLINIC | Age: 58
End: 2023-08-21
Attending: FAMILY MEDICINE
Payer: COMMERCIAL

## 2023-08-21 VITALS
WEIGHT: 200 LBS | HEART RATE: 82 BPM | DIASTOLIC BLOOD PRESSURE: 82 MMHG | SYSTOLIC BLOOD PRESSURE: 134 MMHG | TEMPERATURE: 97.1 F | OXYGEN SATURATION: 96 % | BODY MASS INDEX: 32.14 KG/M2 | RESPIRATION RATE: 16 BRPM | HEIGHT: 66 IN

## 2023-08-21 DIAGNOSIS — I10 BENIGN ESSENTIAL HYPERTENSION: ICD-10-CM

## 2023-08-21 DIAGNOSIS — E11.65 TYPE 2 DIABETES MELLITUS WITH HYPERGLYCEMIA, WITHOUT LONG-TERM CURRENT USE OF INSULIN (H): Primary | ICD-10-CM

## 2023-08-21 DIAGNOSIS — E78.2 MIXED HYPERLIPIDEMIA: ICD-10-CM

## 2023-08-21 PROCEDURE — 99214 OFFICE O/P EST MOD 30 MIN: CPT | Mod: 25 | Performed by: FAMILY MEDICINE

## 2023-08-21 PROCEDURE — 90471 IMMUNIZATION ADMIN: CPT | Performed by: FAMILY MEDICINE

## 2023-08-21 PROCEDURE — 90677 PCV20 VACCINE IM: CPT | Performed by: FAMILY MEDICINE

## 2023-08-21 PROCEDURE — 90472 IMMUNIZATION ADMIN EACH ADD: CPT | Performed by: FAMILY MEDICINE

## 2023-08-21 PROCEDURE — 90636 HEP A/HEP B VACC ADULT IM: CPT | Performed by: FAMILY MEDICINE

## 2023-08-21 RX ORDER — SPIRONOLACTONE 50 MG/1
50 TABLET, FILM COATED ORAL DAILY
Qty: 90 TABLET | Refills: 1 | Status: SHIPPED | OUTPATIENT
Start: 2023-08-21 | End: 2023-10-23

## 2023-08-21 RX ORDER — ATORVASTATIN CALCIUM 40 MG/1
40 TABLET, FILM COATED ORAL DAILY
Qty: 90 TABLET | Refills: 4 | Status: SHIPPED | OUTPATIENT
Start: 2023-08-21 | End: 2024-02-08

## 2023-08-21 RX ORDER — ASPIRIN 81 MG/1
81 TABLET ORAL DAILY
COMMUNITY
Start: 2023-08-21

## 2023-08-21 RX ORDER — METFORMIN HCL 500 MG
TABLET, EXTENDED RELEASE 24 HR ORAL
Qty: 78 TABLET | Refills: 1 | Status: SHIPPED | OUTPATIENT
Start: 2023-08-21 | End: 2023-09-29

## 2023-08-21 RX ORDER — SPIRONOLACTONE 25 MG/1
25 TABLET ORAL DAILY
Qty: 90 TABLET | Refills: 3 | Status: CANCELLED | OUTPATIENT
Start: 2023-08-21

## 2023-08-21 ASSESSMENT — PAIN SCALES - GENERAL: PAINLEVEL: NO PAIN (0)

## 2023-08-21 NOTE — NURSING NOTE
Prior to immunization administration, verified patients identity using patient s name and date of birth. Please see Immunization Activity for additional information.     Screening Questionnaire for Adult Immunization    Are you sick today?   Yes   Do you have allergies to medications, food, a vaccine component or latex?   No   Have you ever had a serious reaction after receiving a vaccination?   No   Do you have a long-term health problem with heart, lung, kidney, or metabolic disease (e.g., diabetes), asthma, a blood disorder, no spleen, complement component deficiency, a cochlear implant, or a spinal fluid leak?  Are you on long-term aspirin therapy?   No   Do you have cancer, leukemia, HIV/AIDS, or any other immune system problem?   No   Do you have a parent, brother, or sister with an immune system problem?   No   In the past 3 months, have you taken medications that affect  your immune system, such as prednisone, other steroids, or anticancer drugs; drugs for the treatment of rheumatoid arthritis, Crohn s disease, or psoriasis; or have you had radiation treatments?   Yes   Have you had a seizure, or a brain or other nervous system problem?   No   During the past year, have you received a transfusion of blood or blood    products, or been given immune (gamma) globulin or antiviral drug?   No   For women: Are you pregnant or is there a chance you could become       pregnant during the next month?   No   Have you received any vaccinations in the past 4 weeks?   No     Immunization questionnaire was positive for at least one answer.  Notified Dr. Benavides.      Patient instructed to remain in clinic for 15 minutes afterwards, and to report any adverse reactions.     Screening performed by Yoli Voss MA on 8/21/2023 at 3:29 PM.

## 2023-08-21 NOTE — PATIENT INSTRUCTIONS
1.  Saline sinus rinse (one form comes in a squirt bottle form while another kind is known as a Neti Pots).  Follow directions on package.  May do this 1-2 times per day.  2.  May also use Afrin (oxymetazoline) nasal spray for a maximum of 3 days for symptom control.  Do not use for longer than this.  A good idea is to use this medication with saline nasal irrigation.  If you choose to do this, use the Afrin about 30-45 minutes after the sinus rinse to maximize effect of medication.  3.  Flonase (fluticasone):  use as directed on package or prescription.  A good idea is to use this medication with saline nasal irrigation.  If you choose to do this, use the fluticasone about 30-45 minutes after the sinus rinse to maximize effect of medication.    4.  Antihistamines:  Daytime:  Zyrtec (cetirizine).  10 mg once to twice daily.  5.  Tylenol (acetaminophen) or Advil (ibuprofen) as needed for pain and/or fever.

## 2023-08-21 NOTE — PROGRESS NOTES
Assessment & Plan     ASSESSMENT/ORDERS:    ICD-10-CM    1. Type 2 diabetes mellitus with hyperglycemia, without long-term current use of insulin (H)  E11.65 blood glucose monitoring (NO BRAND SPECIFIED) meter device kit     blood glucose (NO BRAND SPECIFIED) test strip     blood glucose (NO BRAND SPECIFIED) lancets standard     metFORMIN (GLUCOPHAGE XR) 500 MG 24 hr tablet      2. Benign essential hypertension  I10 spironolactone (ALDACTONE) 50 MG tablet      3. Mixed hyperlipidemia  E78.2 atorvastatin (LIPITOR) 40 MG tablet        PLAN:  1.  Reviewed new diagnosis of diabetes.  Metformin started today.  Glucose monitoring supplies ordered ahead of upcoming diabetic ed appointment.  2.   Hypertension not at goal of 130/80, spironolactone dose increased.     Follow-up Visit   Expected date:  Nov 21, 2023 (Approximate)      Follow Up Appointment Details:     Follow-up with whom?: Me    Follow-Up for what?: Chronic Disease f/u    Chronic Disease f/u: Diabetes    How?: In Person                                 Minh Benavides MD  [facility]    Randi Roberson is a 58 year old, presenting for the following health issues:  RECHECK        8/21/2023     2:09 PM   Additional Questions   Roomed by Yoli YEAGER       History of Present Illness       Hypertension: He presents for follow up of hypertension.  He does not check blood pressure  regularly outside of the clinic. Outside blood pressures have been over 140/90. He follows a low salt diet.     He eats 0-1 servings of fruits and vegetables daily.He consumes 0 sweetened beverage(s) daily.He exercises with enough effort to increase his heart rate 10 to 19 minutes per day.  He exercises with enough effort to increase his heart rate 4 days per week.   He is taking medications regularly.           Lab Results   Component Value Date    A1C 8.5 07/20/2023    A1C 6.4 06/09/2022    A1C 6.2 11/08/2018   New diabetes diagnosis.  Has [facility]  "appointment in 4 days.  Virtual visit.    Started on spironolactone at last visit for hypertension management.    Has hyperlipidemia and on statin.  Triglycerides high last check, but likely due to diabetes diagnosis.    Lab Results   Component Value Date    CHOL 240 07/20/2023    CHOL 199 01/19/2021     Lab Results   Component Value Date    HDL 39 07/20/2023    HDL 34 01/19/2021     Lab Results   Component Value Date    LDL  07/20/2023      Comment:      Cannot estimate LDL when triglyceride exceeds 400 mg/dL     07/20/2023     05/27/2022    LDL 92 01/19/2021     Lab Results   Component Value Date    TRIG 579 07/20/2023    TRIG 365 01/19/2021     No results found for: CHOLHDLRATIO     Review of Systems         Objective    /82   Pulse 82   Temp 97.1  F (36.2  C) (Temporal)   Resp 16   Ht 1.676 m (5' 6\")   Wt 90.7 kg (200 lb)   SpO2 96%   BMI 32.28 kg/m    Body mass index is 32.28 kg/m .  Physical Exam  Constitutional:       Appearance: Normal appearance. He is well-developed.   Cardiovascular:      Rate and Rhythm: Normal rate and regular rhythm.      Heart sounds: Normal heart sounds, S1 normal and S2 normal. No murmur heard.  Pulmonary:      Effort: Pulmonary effort is normal. No respiratory distress.      Breath sounds: Normal breath sounds. No wheezing, rhonchi or rales.   Neurological:      Mental Status: He is alert.                            "

## 2023-08-25 ENCOUNTER — VIRTUAL VISIT (OUTPATIENT)
Dept: EDUCATION SERVICES | Facility: CLINIC | Age: 58
End: 2023-08-25
Attending: FAMILY MEDICINE
Payer: COMMERCIAL

## 2023-08-25 DIAGNOSIS — E11.65 TYPE 2 DIABETES MELLITUS WITH HYPERGLYCEMIA, WITHOUT LONG-TERM CURRENT USE OF INSULIN (H): ICD-10-CM

## 2023-08-25 PROCEDURE — G0108 DIAB MANAGE TRN  PER INDIV: HCPCS | Mod: 95

## 2023-08-25 NOTE — PATIENT INSTRUCTIONS
Take Metformin with a snack before you go to bed.    1) Check blood sugar 1-2 times per day   Fasting/morning goal: <130 mg/dL  2 Hours after the start of a meal: Less than 180 mg/dL    2) 1 serving of carbohydrate = 15 grams  Aim for 4 servings or 60 grams per meal and 15-20 grams of carbs per snack + a serving of protein  Use My plate and portion control with carbohydrate foods    3) Increase physical activity as able- any movement is good.

## 2023-08-25 NOTE — LETTER
8/25/2023         RE: Da Akins  [address] [facility] [address]        Dear Colleague,    Thank you for referring your patient, Da Akins, to the [facility]ABET[facility]UC[facility] [place]. Please see a copy of my visit note below.    Diabetes Self-Management Education & Support    Presents for: Initial Assessment for new diagnosis    Type of service:  Video Visit    If the video visit is dropped, the video visit invitation should be resent by: Text to cell phone: [phone]    Originating Location (pt. Location): Home  Distant Location (provider location): Offsite  Mode of Communication:  Video Conference via Jimmy Fairly    Video Start Time:  11am  Video End Time (time video stopped): 11:47am    How would patient like to obtain AVS? MyChart    Assessment Type:   ASSESSMENT:    Initial visit today with Da for new Dx of DM type 2.  He reports a family Hx of DM.  Has not started taking Metformin yet.  Wasn't sure when to take it because he does not eat before bed.  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  Says he has been trying to lose weight recently by avoiding bread, noodles and carbs.  Weekends are mostly the same, eats one meal generally.  Does not eat fruit or vegetables regularly.  Has meter, but has not used yet.  Demonstrated use during visit and he was able to test his BG- high during call: 220 mg/dL.  Reviewed meter use and BG goals as well as when to take medication.  Also discussed pathophysiology of diabetes and carbohydrates role in the body.    Patient's most recent   Lab Results   Component Value Date    A1C 8.5 07/20/2023    A1C 6.2 11/08/2018     is not meeting goal of <7.0    Diabetes knowledge and skills assessment:   Patient is knowledgeable in diabetes management concepts related to: Being Active    Continue education with the following diabetes management concepts: Healthy Eating, Being Active, Monitoring, Taking Medication,  "Problem Solving, Reducing Risks, and Healthy Coping    Based on learning assessment above, most appropriate setting for further diabetes education would be: Individual setting.      PLAN    Take Metformin with a snack before you go to bed.    1) Check blood sugar 1-2 times per day   Fasting/morning goal: <130 mg/dL  2 Hours after the start of a meal: Less than 180 mg/dL    2) 1 serving of carbohydrate = 15 grams  Aim for 4 servings or 60 grams per meal and 15-20 grams of carbs per snack + a serving of protein  Use My plate and portion control with carbohydrate foods    3) Increase physical activity as able- any movement is good.    Topics to cover at upcoming visits: Healthy Eating, Being Active, Monitoring, and Taking Medication    Follow-up: October 10th, 2pm- in person    See Care Plan for co-developed, patient-state behavior change goals.  AVS provided for patient today.    Education Materials Provided:   [facility] Understanding Diabetes Booklet and My Plate Planner      SUBJECTIVE/OBJECTIVE:  Presents for: Initial Assessment for new diagnosis  Accompanied by: Self  Diabetes education in the past 24mo: No  Focus of Visit: Monitoring, Taking Medication, Healthy Eating, Diabetes Pathophysiology  Diabetes type: Type 2  Disease course: Stable  Cultural Influences/Ethnic Background:  Choose not to answer      Diabetes Symptoms & Complications:  Fatigue: Yes  Neuropathy: No  Polydipsia: No  Polyphagia: No  Polyuria: Yes  Visual change: No  Slow healing wounds: No  Symptom course: Stable  Weight trend: Stable  Complications assessed today?: No    Patient Problem List and Family Medical History reviewed for relevant medical history, current medical status, and diabetes risk factors.    Vitals:  There were no vitals taken for this visit.  Estimated body mass index is 32.28 kg/m  as calculated from the following:    Height as of 8/21/23: 1.676 m (5' 6\").    Weight as of 8/21/23: 90.7 kg (200 lb).   Last 3 BP:   BP " Readings from Last 3 Encounters:   08/21/23 134/82   07/27/23 126/82   07/20/23 132/86       History   Smoking Status     Never   Smokeless Tobacco     Never       Labs:  Lab Results   Component Value Date    A1C 8.5 07/20/2023    A1C 6.2 11/08/2018     Lab Results   Component Value Date     07/27/2023     11/28/2022     01/19/2021     Lab Results   Component Value Date    LDL  07/20/2023      Comment:      Cannot estimate LDL when triglyceride exceeds 400 mg/dL     07/20/2023     05/27/2022    LDL 92 01/19/2021     HDL Cholesterol   Date Value Ref Range Status   01/19/2021 34 (L) >39 mg/dL Final     Direct Measure HDL   Date Value Ref Range Status   07/20/2023 39 (L) >=40 mg/dL Final   ]  GFR Estimate   Date Value Ref Range Status   07/27/2023 79 >60 mL/min/1.73m2 Final   01/19/2021 >90 >60 mL/min/[1.73_m2] Final     Comment:     Non  GFR Calc  Starting 12/18/2018, serum creatinine based estimated GFR (eGFR) will be   calculated using the Chronic Kidney Disease Epidemiology Collaboration   (CKD-EPI) equation.       GFR Estimate If Black   Date Value Ref Range Status   01/19/2021 >90 >60 mL/min/[1.73_m2] Final     Comment:      GFR Calc  Starting 12/18/2018, serum creatinine based estimated GFR (eGFR) will be   calculated using the Chronic Kidney Disease Epidemiology Collaboration   (CKD-EPI) equation.       Lab Results   Component Value Date    CR 1.10 07/27/2023    CR 0.89 01/19/2021     No results found for: MICROALBUMIN    Healthy Eating:  Healthy Eating Assessed Today: Yes  Meals include: Lunch, Dinner, Evening Snack  Lunch: 4/4:30pm: [employer] [place] no bun OR scambled eggs baltazar OR grilled chicken with peppers OR pizza- but avoids the crust  Dinner: deli meat- no bread  Snacks: used to eat apples likes veggies- doesn't buy them  Beverages: Water, Sports drinks, Other (no sugar added)  Has patient met with a dietitian in the past?:  No    Being Active:  Being Active Assessed Today: Yes  Exercise:: Yes  How intense was your typical exercise? : Light (like stretching or slow walking)  Barrier to exercise: Time    Monitoring:  Monitoring Assessed Today: Yes  Did patient bring glucose meter to appointment? : Yes  Blood Glucose Meter: Unknown  Times checking blood sugar at home (number): Never      Taking Medications:  Diabetes Medication(s)       Biguanides       metFORMIN (GLUCOPHAGE XR) 500 MG 24 hr tablet    Take daily with food.  1 tab x1 week, then 2 tabs x1 week, then 3 tabs x1 week, then 4 tabs daily.            Taking Medication Assessed Today: Yes  Problems taking diabetes medications regularly?: Yes  Diabetes medication side effects?: No    Problem Solving:                 Reducing Risks:       Healthy Coping:  Healthy Coping Assessed Today: No  Emotional response to diabetes: Ready to learn  Stage of change: ACTION (Actively working towards change)  Patient Activation Measure Survey Score:      11/30/2015     8:00 AM   REHANA Score (Last Two)   REHANA Raw Score 39   Activation Score 56.4   REHANA Level 3         Care Plan and Education Provided:  Patient was instructed on Accu-Chek Guide meter and was able to provide an accurate return demonstration. Patient's blood glucose reading today was 220 mg/dL.  Care Plan: Diabetes   Updates made by Elsa Dersa since 8/25/2023 12:00 AM        Problem: HbA1C Not In Goal         Goal: Establish Regular Follow-Ups with PCP         Task: Discuss with PCP the recommended timing for patient's next follow up visit(s)    Responsible User: Elsa Deras        Task: Discuss schedule for PCP visits with patient    Responsible User: Elsa Deras        Goal: Get HbA1C Level in Goal         Task: Educate patient on diabetes education self-management topics Completed 8/25/2023   Responsible User: Elsa Deras        Task: Educate patient on benefits of regular glucose  monitoring Completed 8/25/2023   Responsible User: Elsa Deras        Task: Refer patient to appropriate extended care team member, as needed (Medication Therapy Management, Behavioral Health, Physical Therapy, etc.)    Responsible User: Elsa Deras        Task: Discuss diabetes treatment plan with patient Completed 8/25/2023   Responsible User: Elsa Deras        Problem: Diabetes Self-Management Education Needed to Optimize Self-Care Behaviors         Goal: Understand diabetes pathophysiology and disease progression         Task: Provide education on diabetes pathophysiology and disease progression specfic to patient's diabetes type Completed 8/25/2023   Responsible User: Elsa Deras        Goal: Healthy Eating - follow a healthy eating pattern for diabetes         Task: Provide education on portion control and consistency in amount, composition and timing of food intake Completed 8/25/2023   Responsible User: Elsa Deras        Task: Provide education on managing carbohydrate intake (carbohydrate counting, plate planning method, etc.)    Responsible User: Elsa Deras        Task: Provide education on weight management    Responsible User: Elsa Deras        Task: Provide education on heart healthy eating    Responsible User: Elsa Deras        Task: Provide education on eating out    Responsible User: Elsa Deras        Task: Develop individualized healthy eating plan with patient    Responsible User: Elsa Deras        Goal: Being Active - get regular physical activity, working up to at least 150 minutes per week         Task: Provide education on relationship of activity to glucose and precautions to take if at risk for low glucose Completed 8/25/2023   Responsible User: Elsa Deras        Task: Discuss barriers to physical activity with patient    Responsible User:  Elsa Deras        Task: Develop physical activity plan with patient    Responsible User: Elsa Deras        Task: Explore community resources including walking groups, assistance programs, and home videos    Responsible User: Elsa Deras        Goal: Monitoring - monitor glucose and ketones as directed    Start Date: 8/25/2023   This Visit's Progress: 0%   Note:    My Goal: I will begin testing BG 1-2 times per day     What I need to meet my goal: meter, supplies and log    I plan to meet my goal by this date: 2 months        Task: Provide education on blood glucose monitoring (purpose, proper technique, frequency, glucose targets, interpreting results, when to use glucose control solution, sharps disposal) Completed 8/25/2023   Responsible User: Elsa Deras        Task: Provide education on continuous glucose monitoring (sensor placement, use of rosa isela or /reader, understanding glucose trends, alerts and alarms, differences between sensor glucose and blood glucose)    Responsible User: Elsa Dears        Task: Provide education on ketone monitoring (when to monitor, frequency, etc.)    Responsible User: Elsa Deras        Goal: Taking Medication - patient is consistently taking medications as directed         Task: Provide education on action of prescribed medication, including when to take and possible side effects    Responsible User: Elsa Deras        Task: Provide education on insulin and injectable diabetes medications, including administration, storage, site selection and rotation for injection sites    Responsible User: Elsa Deras        Task: Discuss barriers to medication adherence with patient and provide management technique ideas as appropriate    Responsible User: Elsa Deras        Task: Provide education on frequency and refill details of medications    Responsible User:  Elsa Deras        Goal: Problem Solving - know how to prevent and manage short-term diabetes complications         Task: Provide education on high blood glucose - causes, signs/symptoms, prevention and treatment Completed 8/25/2023   Responsible User: Elsa Deras        Task: Provide education on low blood glucose - causes, signs/symptoms, prevention, treatment, carrying a carbohydrate source at all times, and medical identification    Responsible User: Elsa Deras        Task: Provide education on safe travel with diabetes    Responsible User: Elsa Deras        Task: Provide education on how to care for diabetes on sick days    Responsible User: Elsa Deras        Task: Provide education on when to call a health care provider    Responsible User: Elsa Deras        Goal: Reducing Risks - know how to prevent and treat long-term diabetes complications         Task: Provide education on major complications of diabetes, prevention, early diagnostic measures and treatment of complications    Responsible User: Elsa Deras        Task: Provide education on recommended care for dental, eye and foot health    Responsible User: Elsa Deras        Task: Provide education on Hemoglobin A1c - goals and relationship to blood glucose levels Completed 8/25/2023   Responsible User: Elsa Deras        Task: Provide education on recommendations for heart health - lipid levels and goals, blood pressure and goals, and aspirin therapy, if indicated    Responsible User: Elsa Deras        Task: Provide education on tobacco cessation    Responsible User: Elsa Deras        Goal: Healthy Coping - use available resources to cope with the challenges of managing diabetes         Task: Discuss recognizing feelings about having diabetes    Responsible User: Elsa Deras        Task: Provide  education on the benefits of making appropriate lifestyle changes    Responsible User: Elsa Deras        Task: Provide education on benefits of utilizing support systems    Responsible User: Elsa Deras        Task: Discuss methods for coping with stress    Responsible User: Elsa Deras        Task: Provide education on when to seek professional counseling    Responsible User: Elsa Deras, JENNIFER, LD, [facility]  Outpatient Diabetes Education  Adult Diabetes Education Triage [phone]      Time Spent: 47 minutes  Encounter Type: Individual    Any diabetes medication dose changes were made via the CDE Protocol per the patient's referring provider. A copy of this encounter was shared with the provider.

## 2023-08-25 NOTE — PROGRESS NOTES
Diabetes Self-Management Education & Support    Presents for: Initial Assessment for new diagnosis    Type of service:  Video Visit    If the video visit is dropped, the video visit invitation should be resent by: Text to cell phone: [phone]    Originating Location (pt. Location): Home  Distant Location (provider location): Offsite  Mode of Communication:  Video Conference via Weesh    Video Start Time:  11am  Video End Time (time video stopped): 11:47am    How would patient like to obtain AVS? Duanehart    Assessment Type:   ASSESSMENT:    Initial visit today with aD for new Dx of DM type 2.  He reports a family Hx of DM.  Has not started taking Metformin yet.  Wasn't sure when to take it because he does not eat before bed.  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  Says he has been trying to lose weight recently by avoiding bread, noodles and carbs.  Weekends are mostly the same, eats one meal generally.  Does not eat fruit or vegetables regularly.  Has meter, but has not used yet.  Demonstrated use during visit and he was able to test his BG- high during call: 220 mg/dL.  Reviewed meter use and BG goals as well as when to take medication.  Also discussed pathophysiology of diabetes and carbohydrates role in the body.    Patient's most recent   Lab Results   Component Value Date    A1C 8.5 07/20/2023    A1C 6.2 11/08/2018     is not meeting goal of <7.0    Diabetes knowledge and skills assessment:   Patient is knowledgeable in diabetes management concepts related to: Being Active    Continue education with the following diabetes management concepts: Healthy Eating, Being Active, Monitoring, Taking Medication, Problem Solving, Reducing Risks, and Healthy Coping    Based on learning assessment above, most appropriate setting for further diabetes education would be: Individual setting.      PLAN    Take Metformin with a snack before you go to bed.    1) Check blood sugar 1-2  "times per day   Fasting/morning goal: <130 mg/dL  2 Hours after the start of a meal: Less than 180 mg/dL    2) 1 serving of carbohydrate = 15 grams  Aim for 4 servings or 60 grams per meal and 15-20 grams of carbs per snack + a serving of protein  Use My plate and portion control with carbohydrate foods    3) Increase physical activity as able- any movement is good.    Topics to cover at upcoming visits: Healthy Eating, Being Active, Monitoring, and Taking Medication    Follow-up: October 10th, 2pm- in person    See Care Plan for co-developed, patient-state behavior change goals.  AVS provided for patient today.    Education Materials Provided:  [facility] Understanding Diabetes Booklet and My Plate Planner      SUBJECTIVE/OBJECTIVE:  Presents for: Initial Assessment for new diagnosis  Accompanied by: Self  Diabetes education in the past 24mo: No  Focus of Visit: Monitoring, Taking Medication, Healthy Eating, Diabetes Pathophysiology  Diabetes type: Type 2  Disease course: Stable  Cultural Influences/Ethnic Background:  Choose not to answer      Diabetes Symptoms & Complications:  Fatigue: Yes  Neuropathy: No  Polydipsia: No  Polyphagia: No  Polyuria: Yes  Visual change: No  Slow healing wounds: No  Symptom course: Stable  Weight trend: Stable  Complications assessed today?: No    Patient Problem List and Family Medical History reviewed for relevant medical history, current medical status, and diabetes risk factors.    Vitals:  There were no vitals taken for this visit.  Estimated body mass index is 32.28 kg/m  as calculated from the following:    Height as of 8/21/23: 1.676 m (5' 6\").    Weight as of 8/21/23: 90.7 kg (200 lb).   Last 3 BP:   BP Readings from Last 3 Encounters:   08/21/23 134/82   07/27/23 126/82   07/20/23 132/86       History   Smoking Status    Never   Smokeless Tobacco    Never       Labs:  Lab Results   Component Value Date    A1C 8.5 07/20/2023    A1C 6.2 11/08/2018     Lab Results "   Component Value Date     07/27/2023     11/28/2022     01/19/2021     Lab Results   Component Value Date    LDL  07/20/2023      Comment:      Cannot estimate LDL when triglyceride exceeds 400 mg/dL     07/20/2023     05/27/2022    LDL 92 01/19/2021     HDL Cholesterol   Date Value Ref Range Status   01/19/2021 34 (L) >39 mg/dL Final     Direct Measure HDL   Date Value Ref Range Status   07/20/2023 39 (L) >=40 mg/dL Final   ]  GFR Estimate   Date Value Ref Range Status   07/27/2023 79 >60 mL/min/1.73m2 Final   01/19/2021 >90 >60 mL/min/[1.73_m2] Final     Comment:     Non  GFR Calc  Starting 12/18/2018, serum creatinine based estimated GFR (eGFR) will be   calculated using the Chronic Kidney Disease Epidemiology Collaboration   (CKD-EPI) equation.       GFR Estimate If Black   Date Value Ref Range Status   01/19/2021 >90 >60 mL/min/[1.73_m2] Final     Comment:      GFR Calc  Starting 12/18/2018, serum creatinine based estimated GFR (eGFR) will be   calculated using the Chronic Kidney Disease Epidemiology Collaboration   (CKD-EPI) equation.       Lab Results   Component Value Date    CR 1.10 07/27/2023    CR 0.89 01/19/2021     No results found for: MICROALBUMIN    Healthy Eating:  Healthy Eating Assessed Today: Yes  Meals include: Lunch, Dinner, Evening Snack  Lunch: 4/4:30pm: [place] [place] no bun OR scambled eggs baltazar OR grilled chicken with peppers OR pizza- but avoids the crust  Dinner: deli meat- no bread  Snacks: used to eat apples likes veggies- doesn't buy them  Beverages: Water, Sports drinks, Other (no sugar added)  Has patient met with a dietitian in the past?: No    Being Active:  Being Active Assessed Today: Yes  Exercise:: Yes  How intense was your typical exercise? : Light (like stretching or slow walking)  Barrier to exercise: Time    Monitoring:  Monitoring Assessed Today: Yes  Did patient bring glucose meter to appointment?  : Yes  Blood Glucose Meter: Unknown  Times checking blood sugar at home (number): Never      Taking Medications:  Diabetes Medication(s)       Biguanides       metFORMIN (GLUCOPHAGE XR) 500 MG 24 hr tablet    Take daily with food.  1 tab x1 week, then 2 tabs x1 week, then 3 tabs x1 week, then 4 tabs daily.            Taking Medication Assessed Today: Yes  Problems taking diabetes medications regularly?: Yes  Diabetes medication side effects?: No    Problem Solving:                 Reducing Risks:       Healthy Coping:  Healthy Coping Assessed Today: No  Emotional response to diabetes: Ready to learn  Stage of change: ACTION (Actively working towards change)  Patient Activation Measure Survey Score:      11/30/2015     8:00 AM   REHANA Score (Last Two)   REHANA Raw Score 39   Activation Score 56.4   REHANA Level 3         Care Plan and Education Provided:  Patient was instructed on Accu-Chek Guide meter and was able to provide an accurate return demonstration. Patient's blood glucose reading today was 220 mg/dL.  Care Plan: Diabetes   Updates made by Elsa Deras since 8/25/2023 12:00 AM        Problem: HbA1C Not In Goal         Goal: Establish Regular Follow-Ups with PCP         Task: Discuss with PCP the recommended timing for patient's next follow up visit(s)    Responsible User: Elsa Deras        Task: Discuss schedule for PCP visits with patient    Responsible User: Elsa Deras        Goal: Get HbA1C Level in Goal         Task: Educate patient on diabetes education self-management topics Completed 8/25/2023   Responsible User: Elsa Deras        Task: Educate patient on benefits of regular glucose monitoring Completed 8/25/2023   Responsible User: Elsa Deras        Task: Refer patient to appropriate extended care team member, as needed (Medication Therapy Management, Behavioral Health, Physical Therapy, etc.)    Responsible User: Elsa Deras         Task: Discuss diabetes treatment plan with patient Completed 8/25/2023   Responsible User: Elsa Deras        Problem: Diabetes Self-Management Education Needed to Optimize Self-Care Behaviors         Goal: Understand diabetes pathophysiology and disease progression         Task: Provide education on diabetes pathophysiology and disease progression specfic to patient's diabetes type Completed 8/25/2023   Responsible User: Elsa Deras        Goal: Healthy Eating - follow a healthy eating pattern for diabetes         Task: Provide education on portion control and consistency in amount, composition and timing of food intake Completed 8/25/2023   Responsible User: Elsa Deras        Task: Provide education on managing carbohydrate intake (carbohydrate counting, plate planning method, etc.)    Responsible User: Elsa Deras        Task: Provide education on weight management    Responsible User: Elsa Deras        Task: Provide education on heart healthy eating    Responsible User: Elsa Deras        Task: Provide education on eating out    Responsible User: Elsa Deras        Task: Develop individualized healthy eating plan with patient    Responsible User: Elsa Deras        Goal: Being Active - get regular physical activity, working up to at least 150 minutes per week         Task: Provide education on relationship of activity to glucose and precautions to take if at risk for low glucose Completed 8/25/2023   Responsible User: Elsa Deras        Task: Discuss barriers to physical activity with patient    Responsible User: Elsa Deras        Task: Develop physical activity plan with patient    Responsible User: Elsa Deras        Task: Explore community resources including walking groups, assistance programs, and home videos    Responsible User: Elsa Deras         Goal: Monitoring - monitor glucose and ketones as directed    Start Date: 8/25/2023   This Visit's Progress: 0%   Note:    My Goal: I will begin testing BG 1-2 times per day     What I need to meet my goal: meter, supplies and log    I plan to meet my goal by this date: 2 months        Task: Provide education on blood glucose monitoring (purpose, proper technique, frequency, glucose targets, interpreting results, when to use glucose control solution, sharps disposal) Completed 8/25/2023   Responsible User: Elsa Deras        Task: Provide education on continuous glucose monitoring (sensor placement, use of rosa isela or /reader, understanding glucose trends, alerts and alarms, differences between sensor glucose and blood glucose)    Responsible User: Elsa Deras        Task: Provide education on ketone monitoring (when to monitor, frequency, etc.)    Responsible User: Elsa Deras        Goal: Taking Medication - patient is consistently taking medications as directed         Task: Provide education on action of prescribed medication, including when to take and possible side effects    Responsible User: Elsa Deras        Task: Provide education on insulin and injectable diabetes medications, including administration, storage, site selection and rotation for injection sites    Responsible User: Elsa Deras        Task: Discuss barriers to medication adherence with patient and provide management technique ideas as appropriate    Responsible User: Elsa Deras        Task: Provide education on frequency and refill details of medications    Responsible User: Elsa Deras        Goal: Problem Solving - know how to prevent and manage short-term diabetes complications         Task: Provide education on high blood glucose - causes, signs/symptoms, prevention and treatment Completed 8/25/2023   Responsible User: Augusta  Elsa        Task: Provide education on low blood glucose - causes, signs/symptoms, prevention, treatment, carrying a carbohydrate source at all times, and medical identification    Responsible User: Elsa Deras        Task: Provide education on safe travel with diabetes    Responsible User: Elsa Deras        Task: Provide education on how to care for diabetes on sick days    Responsible User: Elsa Deras        Task: Provide education on when to call a health care provider    Responsible User: Elsa Deras        Goal: Reducing Risks - know how to prevent and treat long-term diabetes complications         Task: Provide education on major complications of diabetes, prevention, early diagnostic measures and treatment of complications    Responsible User: Elsa Deras        Task: Provide education on recommended care for dental, eye and foot health    Responsible User: Elsa Deras        Task: Provide education on Hemoglobin A1c - goals and relationship to blood glucose levels Completed 8/25/2023   Responsible User: Elsa Deras        Task: Provide education on recommendations for heart health - lipid levels and goals, blood pressure and goals, and aspirin therapy, if indicated    Responsible User: Elsa Deras        Task: Provide education on tobacco cessation    Responsible User: Elsa Deras        Goal: Healthy Coping - use available resources to cope with the challenges of managing diabetes         Task: Discuss recognizing feelings about having diabetes    Responsible User: Elsa Deras        Task: Provide education on the benefits of making appropriate lifestyle changes    Responsible User: Elsa Deras        Task: Provide education on benefits of utilizing support systems    Responsible User: Elsa Dears        Task: Discuss methods for coping with  stress    Responsible User: Elsa Deras        Task: Provide education on when to seek professional counseling    Responsible User: Elsa Deras, JENNIFER, LD, [facility]  Outpatient Diabetes Education  Adult Diabetes Education Triage [phone]      Time Spent: 47 minutes  Encounter Type: Individual    Any diabetes medication dose changes were made via the CDE Protocol per the patient's referring provider. A copy of this encounter was shared with the provider.

## 2023-09-08 NOTE — TELEPHONE ENCOUNTER
Reason for Call:  Appointment Request    Patient requesting this type of appt:  Preventive and follow up arthritis pain in right knee and hip, and follow up legs swelling due to bp meds     Requested provider: Minh Benavides    Reason patient unable to be scheduled: Not within requested timeframe    When does patient want to be seen/preferred time: this week if possible later in the morning    Comments: patient wondering if he could be worked in     Could we send this information to you in [employer] or would you prefer to receive a phone call?:   Patient would prefer a phone call   Okay to leave a detailed message?: Yes at [phone] (home) or [employer]    Call taken on 6/12/2023 at 2:17 PM by Makayla Loomis       Anesthesia Pre Eval Note    Anesthesia ROS/Med Hx        Anesthetic Complication History:  Patient does not have a history of anesthetic complications      Pulmonary Review:  Patient does not have a pulmonary history    Negative for sleep apnea     Neuro/Psych Review:    Negative for CVA    Cardiovascular Review:  Exercise tolerance: good (>4 METS)  Negative for CAD    Negative for dysrhythmias  Negative for hypertension    GI/HEPATIC/RENAL Review:  Negative for liver disease  Negative for renal disease    End/Other Review:  Negative for diabetes  Negative for hypothyroidism  Additional Results:     ALLERGIES:  No Known Allergies       No results found for: \"WBC\", \"RBC\", \"HGB\", \"HCT\", \"MCV\", \"MCH\", \"MCHC\", \"RDWCV\", \"SODIUM\", \"POTASSIUM\", \"CHLORIDE\", \"CO2\", \"GLUCOSE\", \"BUN\", \"CREATININE\", \"GFRESTIMATE\", \"EGFRNONAFR\", \"GFRA\", \"GFRNA\", \"CALCIUM\", \"HCG\", \"PLT\", \"PTT\", \"INR\"   Past Medical History:  No date: Heavy menstrual bleeding  2020: History of 2019 novel coronavirus disease (COVID-19)      Comment:  resolved  No date: Motion sickness    History reviewed. No pertinent surgical history.       Prior to Admission medications :  Medication Multiple Vitamin (ONE-A-DAY ESSENTIAL PO), Sig , Start Date , End Date , Taking? Yes, Authorizing Provider Provider, Outside    Medication Ferrous Sulfate (IRON PO), Sig , Start Date , End Date , Taking? Yes, Authorizing Provider Provider, Outside         Patient Vitals in the past 24 hrs:  09/08/23 1145, BP:126/78, Temp:36.1 °C (97 °F), Temp src:Temporal, Pulse:(!) 56, Resp:16, SpO2:100 %, Height:5' 1\" (1.549 m), Weight:92.6 kg (204 lb 2.3 oz)      Relevant Problems   No relevant active problems       Physical Exam     Airway   Mallampati: II  TM Distance: >3 FB    Cardiovascular    Cardio Rhythm: Regular  Cardio Rate: Normal    Head Assessment  Head assessment: Normocephalic    General Assessment  General Assessment: Alert and oriented    Dental Exam  Dental exam  normal    Pulmonary Exam  Pulmonary exam normal  Breath sounds clear to auscultation:  Yes    Abdominal Exam  Abdominal exam normal      Anesthesia Plan:    ASA Status: 2  Anesthesia Type: General    Induction: Intravenous  Preferred Airway Type: Nasal Cannula  Maintenance: TIVA    Post-op Pain Management: Per Surgeon      Checklist  Reviewed: NPO Status, Allergies, Medications, Problem list, Past Med History, Lab Results, EKG, Patient Summary, Beta Blocker Status, Outside Records, DNR Status, Consultations and Nursing Notes  Consent/Risks Discussed Statement:  The proposed anesthetic plan, including its risks and benefits, have been discussed with the Patient along with the risks and benefits of alternatives. Questions were encouraged and answered and the patient and/or representative understands and agrees to proceed.        I discussed with the patient (and/or patient's legal representative) the risks and benefits of the proposed anesthesia plan, General, which may include services performed by other anesthesia providers.    Alternative anesthesia plans, if available, were reviewed with the patient (and/or patient's legal representative). Discussion has been held with the patient (and/or patient's legal representative) regarding risks of anesthesia, which include Nausea, Vomiting and Dental Injury and emergent situations that may require change in anesthesia plan.    The patient (and/or patient's legal representative) has indicated understanding, his/her questions have been answered, and he/she wishes to proceed with the planned anesthetic.    Blood Products: Not Anticipated

## 2023-09-28 ENCOUNTER — NURSE TRIAGE (OUTPATIENT)
Dept: FAMILY MEDICINE | Facility: CLINIC | Age: 58
End: 2023-09-28
Payer: COMMERCIAL

## 2023-09-28 NOTE — TELEPHONE ENCOUNTER
"1) Patient is calling today, he states his last blood pressure was 93/70. This was last week.    He has been feeling light headed since then.    He has increased his blood pressure medications recently.  He is not so dizzy that he needs support with walking.  He does not feel faint, but he feels \" spacey\"    He has not rechecked his blood pressure since last week.    He is wanting to get his blood pressure rechecked.    2) He is also feeling nauseated with the metformin every day. He is on 4 pills daily for 2 weeks. This is when the nausea got worse. Please advise if there is a way to reduce the nausea.    Please advise if he can come in to have his blood pressure by a nurse.    Sharon Boudreaux RN on 9/28/2023 at 7:20 AM        Reason for Disposition   Fall in systolic BP > 20 mm Hg from normal and feeling dizzy, lightheaded, or weak    Additional Information   Negative: Systolic BP < 90 and feeling dizzy, lightheaded, or weak   Negative: Started suddenly after an allergic medicine, an allergic food, or bee sting   Negative: Shock suspected (e.g., cold/pale/clammy skin, too weak to stand, low BP, rapid pulse)   Negative: Difficult to awaken or acting confused (e.g., disoriented, slurred speech)   Negative: Fainted   Negative: Chest pain   Negative: Bleeding (e.g., vomiting blood, rectal bleeding or tarry stools, severe vaginal bleeding)   Negative: Extra heartbeats, irregular heart beating, or heart is beating very fast (i.e., 'palpitations')   Negative: Sounds like a life-threatening emergency to the triager   Negative: Systolic BP < 80 and NOT dizzy, lightheaded or weak (feels normal)   Negative: Abdominal pain   Negative: Major surgery in the past month   Negative: Fever > 100.4 F (38.0 C)   Negative: Drinking very little and dehydration suspected (e.g., no urine > 12 hours, very dry mouth, very lightheaded)    Protocols used: Blood Pressure - Low-A-OH    "

## 2023-09-28 NOTE — TELEPHONE ENCOUNTER
Have him come in tomorrow in one of my open slots to see me.    Will have staff notify patient.    Minh Benavides MD

## 2023-09-29 ENCOUNTER — OFFICE VISIT (OUTPATIENT)
Dept: FAMILY MEDICINE | Facility: CLINIC | Age: 58
End: 2023-09-29
Payer: COMMERCIAL

## 2023-09-29 VITALS
WEIGHT: 203.25 LBS | HEIGHT: 65 IN | DIASTOLIC BLOOD PRESSURE: 84 MMHG | SYSTOLIC BLOOD PRESSURE: 138 MMHG | BODY MASS INDEX: 33.86 KG/M2 | HEART RATE: 79 BPM | RESPIRATION RATE: 20 BRPM | TEMPERATURE: 97.9 F | OXYGEN SATURATION: 97 %

## 2023-09-29 DIAGNOSIS — E11.65 TYPE 2 DIABETES MELLITUS WITH HYPERGLYCEMIA, WITHOUT LONG-TERM CURRENT USE OF INSULIN (H): ICD-10-CM

## 2023-09-29 DIAGNOSIS — R42 DIZZINESS: Primary | ICD-10-CM

## 2023-09-29 DIAGNOSIS — R79.89 ELEVATED SERUM CREATININE: ICD-10-CM

## 2023-09-29 LAB
ANION GAP SERPL CALCULATED.3IONS-SCNC: 14 MMOL/L (ref 7–15)
BUN SERPL-MCNC: 43.1 MG/DL (ref 6–20)
CALCIUM SERPL-MCNC: 9.4 MG/DL (ref 8.6–10)
CHLORIDE SERPL-SCNC: 100 MMOL/L (ref 98–107)
CREAT SERPL-MCNC: 1.82 MG/DL (ref 0.67–1.17)
DEPRECATED HCO3 PLAS-SCNC: 19 MMOL/L (ref 22–29)
EGFRCR SERPLBLD CKD-EPI 2021: 43 ML/MIN/1.73M2
GLUCOSE SERPL-MCNC: 147 MG/DL (ref 70–99)
POTASSIUM SERPL-SCNC: 4.7 MMOL/L (ref 3.4–5.3)
SODIUM SERPL-SCNC: 133 MMOL/L (ref 135–145)
TSH SERPL DL<=0.005 MIU/L-ACNC: 1.61 UIU/ML (ref 0.3–4.2)
VIT B12 SERPL-MCNC: 744 PG/ML (ref 232–1245)

## 2023-09-29 PROCEDURE — 99214 OFFICE O/P EST MOD 30 MIN: CPT | Performed by: FAMILY MEDICINE

## 2023-09-29 PROCEDURE — 82607 VITAMIN B-12: CPT | Performed by: FAMILY MEDICINE

## 2023-09-29 PROCEDURE — 36415 COLL VENOUS BLD VENIPUNCTURE: CPT | Performed by: FAMILY MEDICINE

## 2023-09-29 PROCEDURE — 80048 BASIC METABOLIC PNL TOTAL CA: CPT | Performed by: FAMILY MEDICINE

## 2023-09-29 PROCEDURE — 84443 ASSAY THYROID STIM HORMONE: CPT | Performed by: FAMILY MEDICINE

## 2023-09-29 RX ORDER — METFORMIN HCL 500 MG
1000 TABLET, EXTENDED RELEASE 24 HR ORAL
Qty: 180 TABLET | Refills: 1 | Status: SHIPPED | OUTPATIENT
Start: 2023-09-29 | End: 2023-11-21

## 2023-09-29 ASSESSMENT — PAIN SCALES - GENERAL: PAINLEVEL: NO PAIN (0)

## 2023-09-29 NOTE — PROGRESS NOTES
Assessment & Plan     ASSESSMENT/ORDERS:    ICD-10-CM    1. Type 2 diabetes mellitus with hyperglycemia, without long-term current use of insulin (H)  E11.65 metFORMIN (GLUCOPHAGE XR) 500 MG 24 hr tablet     Basic metabolic panel  (Ca, Cl, CO2, Creat, Gluc, K, Na, BUN)     Basic metabolic panel  (Ca, Cl, CO2, Creat, Gluc, K, Na, BUN)      2. Dizziness  R42 Basic metabolic panel  (Ca, Cl, CO2, Creat, Gluc, K, Na, BUN)     TSH with free T4 reflex     Vitamin B12     Basic metabolic panel  (Ca, Cl, CO2, Creat, Gluc, K, Na, BUN)     TSH with free T4 reflex     Vitamin B12        PLAN:  1.  Labs as noted above to evaluate for possible causes of dizziness.  He was reassured by normal ranged blood pressure today.  2.  Refilled metfomrin for diabetes. He has follow-up in a month to review diabetes and hemoglobin A1c.  Will have him drop to 3 pills/day to see if this helps with his nausea from metformin.  This side effects may also be contrigbuting to the symptoms that he is experiencing this past week.                   Minh Benavides MD  [facility]    Randi Roberson is a 58 year old, presenting for the following health issues:  Recheck Medication      9/29/2023    11:04 AM   Additional Questions   Roomed by Tessa VELASCO       History of Present Illness       Reason for visit:  Blood pressure check    He eats 0-1 servings of fruits and vegetables daily.He consumes 0 sweetened beverage(s) daily.He exercises with enough effort to increase his heart rate 10 to 19 minutes per day.  He exercises with enough effort to increase his heart rate 4 days per week.   He is taking medications regularly.         Hypertension Follow-up    Do you check your blood pressure regularly outside of the clinic? Yes   Are you following a low salt diet? Yes  Are your blood pressures ever more than 140 on the top number (systolic) OR more   than 90 on the bottom number (diastolic), for example 140/90? No    Has been  "having some dizziness.  No specific pattern to it.  Blood sugars have been doing better.  Notes that blood pressure has been low, called RN triage yesterday and here today to follow-up on this.      Having nausea since increasing metformin to 4 pills/day from 3.  Some nausea throughout starting medication but had tolerated it fine unitl the jump to 4 pills.    Review of Systems         Objective    /84   Pulse 79   Temp 97.9  F (36.6  C) (Temporal)   Resp 20   Ht 1.656 m (5' 5.2\")   Wt 92.2 kg (203 lb 4 oz)   SpO2 97%   BMI 33.62 kg/m    Body mass index is 33.62 kg/m .  Physical Exam  Constitutional:       Appearance: Normal appearance. He is well-developed.   Cardiovascular:      Rate and Rhythm: Normal rate and regular rhythm.      Heart sounds: Normal heart sounds, S1 normal and S2 normal. No murmur heard.  Pulmonary:      Effort: Pulmonary effort is normal. No respiratory distress.      Breath sounds: Normal breath sounds. No wheezing, rhonchi or rales.   Musculoskeletal:      Right lower leg: No edema.      Left lower leg: No edema.   Neurological:      Mental Status: He is alert.                              "

## 2023-10-03 DIAGNOSIS — M16.0 PRIMARY OSTEOARTHRITIS OF BOTH HIPS: ICD-10-CM

## 2023-10-03 DIAGNOSIS — M25.859 FEMORAL ACETABULAR IMPINGEMENT: ICD-10-CM

## 2023-10-03 NOTE — TELEPHONE ENCOUNTER
Pending Prescriptions:                       Disp   Refills    diclofenac (VOLTAREN) 75 MG EC tablet     60 tab*11           Sig: Take 1 tablet (75 mg) by mouth 2 times daily

## 2023-10-05 RX ORDER — DICLOFENAC SODIUM 75 MG/1
75 TABLET, DELAYED RELEASE ORAL 2 TIMES DAILY
Qty: 60 TABLET | Refills: 0 | Status: SHIPPED | OUTPATIENT
Start: 2023-10-05 | End: 2024-02-08 | Stop reason: SINTOL

## 2023-10-08 NOTE — RESULT ENCOUNTER NOTE
Da,  Your results show that you have had some worsening in your kidney function recently.  This could be related to the nausea you were experiencing with the metformin.  We will recheck this with your follow-up appointment in a couple of weeks.  Go to lab before your appointment.      Hope you are filling better.  Please let me know if you have any questions or if you have not been feeling better since your appointment with me.      Sincerely,  Dr. Benavides

## 2023-10-10 ENCOUNTER — ALLIED HEALTH/NURSE VISIT (OUTPATIENT)
Dept: EDUCATION SERVICES | Facility: CLINIC | Age: 58
End: 2023-10-10
Payer: COMMERCIAL

## 2023-10-10 DIAGNOSIS — E11.65 TYPE 2 DIABETES MELLITUS WITH HYPERGLYCEMIA, WITHOUT LONG-TERM CURRENT USE OF INSULIN (H): Primary | ICD-10-CM

## 2023-10-10 PROCEDURE — G0108 DIAB MANAGE TRN  PER INDIV: HCPCS

## 2023-10-10 NOTE — LETTER
10/10/2023         RE: Da Akins  [address] [facility] [address]        Dear Colleague,    Thank you for referring your patient, Da Akins, to the [facility] DIABETES EDUCATION [place]. Please see a copy of my visit note below.    Diabetes Self-Management Education & Support    Presents for: Follow-up    Type of Visit: In Person    How would patient like to obtain AVS? MyChart    ASSESSMENT:      Was taking Metformin for 4 weeks and then started experiencing significant nausea when he increased to 4 tabs.  Decreased to 2 tabs, and is tolerating better.  BG are not controlled as well with 2 tabs.  Reports  mg/dL yesterday when he tested.  Has meter at home, is testing periodically.  Diet is not well balanced, reviewed the My plate and portion control.  Typically only eats one meal per day and this has not changed.  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  Reviewed establishing a regular eating pattern and balancing meals/snacks to help prevent BG spikes.  Has been limiting/avoiding bread and ice cream.    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    Reviewed medication plan, pt meets with PCP in a couple weeks and plans to discuss.  Will send PCP a message, if Metformin is not tolerated and BG remains around 200 mg/dL on current dose, could try SGLT-2 medication.    Patient's most recent   Lab Results   Component Value Date    A1C 8.5 07/20/2023    A1C 6.2 11/08/2018    is not meeting goal of <7.0    Diabetes knowledge and skills assessment:   Patient is knowledgeable in diabetes management concepts related to: Monitoring, Taking Medication, and Reducing Risks    Continue education with the  following diabetes management concepts: Healthy Eating, Being Active, Taking Medication, Reducing Risks, and Healthy Coping    Based on learning assessment above, most appropriate setting for further diabetes education would be: Individual setting.    INTERVENTIONS:    Education provided today on:  AADE Self-Care Behaviors:  Care Plan: Diabetes   Updates made by Elsa Deras since 10/10/2023 12:00 AM        Problem: Diabetes Self-Management Education Needed to Optimize Self-Care Behaviors         Goal: Healthy Eating - follow a healthy eating pattern for diabetes    Start Date: 10/10/2023   This Visit's Progress: 0%   Note:    My Goal: I will incorporate more than one food group at meals and snacks    What I need to meet my goal: My plate, carbohydrate and protein food list     I plan to meet my goal by this date: 3 months        Task: Provide education on managing carbohydrate intake (carbohydrate counting, plate planning method, etc.) Completed 10/10/2023   Responsible User: Elsa Deras        Goal: Being Active - get regular physical activity, working up to at least 150 minutes per week         Task: Discuss barriers to physical activity with patient Completed 10/10/2023   Responsible User: Elsa Deras        Task: Develop physical activity plan with patient Completed 10/10/2023   Responsible User: Elsa Deras        Goal: Monitoring - monitor glucose and ketones as directed    Start Date: 8/25/2023   This Visit's Progress: 10%   Recent Progress: 0%   Note:    My Goal: I will begin testing BG 1-2 times per day     What I need to meet my goal: meter, supplies and log    I plan to meet my goal by this date: 2 months        Goal: Taking Medication - patient is consistently taking medications as directed         Task: Provide education on action of prescribed medication, including when to take and possible side effects Completed 10/10/2023   Responsible User:  Elsa Deras        Goal: Reducing Risks - know how to prevent and treat long-term diabetes complications         Task: Provide education on major complications of diabetes, prevention, early diagnostic measures and treatment of complications Completed 10/10/2023   Responsible User: Elsa Deras        Task: Provide education on recommended care for dental, eye and foot health Completed 10/10/2023   Responsible User: Elsa Deras        Goal: Healthy Coping - use available resources to cope with the challenges of managing diabetes         Task: Provide education on the benefits of making appropriate lifestyle changes Completed 10/10/2023   Responsible User: Elsa Deras          Opportunities for ongoing education and support in diabetes-self management were discussed. Pt verbalized understanding of concepts discussed and recommendations provided today.       Education Materials Provided:  Carbohydrate Counting, My Plate Planner, and snack list          PLAN    1) Try to establish regular eating pattern- eat a meal or snack every 4 hours or so   A snack should include a carbohydrate choice + protein   For meals, try to use the My plate to help with portion control and meal balance *Add color to your plate    2) When you eat rice or pasta- limit to 1 cup     3) Start thinking about winter and ways to stay active- could be as easy as finding somewhere to walk indoors    - Will talk with Dr. Benavides about plan before you meet with him in a couple weeks.     Topics to cover at upcoming visits: Healthy Eating, Monitoring, Taking Medication, and Reducing Risks  Follow-up: November 21st, 2pm- in person visit    See Goals Section for co-developed, patient-stated behavior change goals.  AVS provided to patient today.          SUBJECTIVE / OBJECTIVE:  Presents for: Follow-up  Accompanied by: Self  Diabetes education in the past 24mo: No  Diabetes type: Type 2  Disease course:  "Stable  How confident are you filling out medical forms by yourself:: Somewhat  Cultural Influences/Ethnic Background:  Choose not to answer      Diabetes Symptoms & Complications:  Fatigue: No  Neuropathy: No  Polydipsia: Yes  Polyphagia: Yes  Polyuria: Yes  Visual change: No  Symptom course: Stable  Weight trend: Stable  Complications assessed today?: No    Patient Problem List and Family Medical History reviewed for relevant medical history, current medical status, and diabetes risk factors.    Vitals:  There were no vitals taken for this visit.  Estimated body mass index is 33.62 kg/m  as calculated from the following:    Height as of 9/29/23: 1.656 m (5' 5.2\").    Weight as of 9/29/23: 92.2 kg (203 lb 4 oz).   Last 3 BP:   BP Readings from Last 3 Encounters:   09/29/23 138/84   08/21/23 134/82   07/27/23 126/82       History   Smoking Status     Never   Smokeless Tobacco     Never       Labs:  Lab Results   Component Value Date    A1C 8.5 07/20/2023    A1C 6.2 11/08/2018     Lab Results   Component Value Date     09/29/2023     11/28/2022     01/19/2021     Lab Results   Component Value Date    LDL  07/20/2023      Comment:      Cannot estimate LDL when triglyceride exceeds 400 mg/dL     07/20/2023     05/27/2022    LDL 92 01/19/2021     HDL Cholesterol   Date Value Ref Range Status   01/19/2021 34 (L) >39 mg/dL Final     Direct Measure HDL   Date Value Ref Range Status   07/20/2023 39 (L) >=40 mg/dL Final   ]  GFR Estimate   Date Value Ref Range Status   09/29/2023 43 (L) >60 mL/min/1.73m2 Final   01/19/2021 >90 >60 mL/min/[1.73_m2] Final     Comment:     Non  GFR Calc  Starting 12/18/2018, serum creatinine based estimated GFR (eGFR) will be   calculated using the Chronic Kidney Disease Epidemiology Collaboration   (CKD-EPI) equation.       GFR Estimate If Black   Date Value Ref Range Status   01/19/2021 >90 >60 mL/min/[1.73_m2] Final     Comment:      " American GFR Calc  Starting 12/18/2018, serum creatinine based estimated GFR (eGFR) will be   calculated using the Chronic Kidney Disease Epidemiology Collaboration   (CKD-EPI) equation.       Lab Results   Component Value Date    CR 1.82 09/29/2023    CR 0.89 01/19/2021     No results found for: MICROALBUMIN    Healthy Eating:  Healthy Eating Assessed Today: Yes  Cultural/Jainism diet restrictions?: No  Meal planning/habits: None  How many times a week on average do you eat food made away from home (restaurant/take-out)?: 3  Meals include: Dinner, Evening Snack  Dinner: 2 Hot dogs and mac and cheese no bun OR [employer] roast beef, no bun OR pizza without crust  Snacks: Hot dog- no bun OR apple, grapes  Other: Not a vegetable eater  Beverages: Water, Sports drinks (Zero sugar powerade)  Has patient met with a dietitian in the past?: Yes    Being Active:  Being Active Assessed Today: Yes  Exercise:: Currently not exercising  Barrier to exercise: Time    Monitoring:  Monitoring Assessed Today: Yes  Did patient bring glucose meter to appointment? : No  Blood Glucose Meter: Accu-chek  Times checking blood sugar at home (number): 2  Times checking blood sugar at home (per): Week  Blood glucose trend: Fluctuating    Taking Medications:  Diabetes Medication(s)       Biguanides       metFORMIN (GLUCOPHAGE XR) 500 MG 24 hr tablet    Take 2 tablets (1,000 mg) by mouth daily (with dinner)          Was taking Metformin for 4 weeks and then started experiencing nausea when he increased to 4 tabs.  Decreased to 2 tabs, and is tolerating better.    Taking Medication Assessed Today: Yes  Current Treatments: Oral Medication (taken by mouth)  Problems taking diabetes medications regularly?: No  Diabetes medication side effects?: Yes    Problem Solving:  Problem Solving Assessed Today: No  Is the patient at risk for hypoglycemia?: No  Is the patient at risk for DKA?: No              Reducing Risks:  Reducing Risks Assessed Today:  No  CAD Risks: Hypertension  Has dilated eye exam at least once a year?: Yes  Sees dentist every 6 months?: Yes  Feet checked by healthcare provider in the last year?: No    Healthy Coping:  Healthy Coping Assessed Today: No  Informal Support system:: Family  Stage of change: PREPARATION (Decided to change - considering how)  Patient Activation Measure Survey Score:      11/30/2015     8:00 AM   REHANA Score (Last Two)   REHANA Raw Score 39   Activation Score 56.4   REHANA Level 3             Gini Deras, JENNIFER, LD, [facility]  Outpatient Diabetes Education  Adult Diabetes Education Triage [phone]    Time Spent: 45 minutes  Encounter Type: Individual        Any diabetes medication dose changes were made via the Certified Diabetes Care & Education Protocol in collaboration with the patient's referring provider. A copy of this encounter was shared with the provider.

## 2023-10-10 NOTE — PATIENT INSTRUCTIONS
1) Try to establish regular eating pattern- eat a meal or snack every 4 hours or so   A snack should include a carbohydrate choice + protein   For meals, try to use the My plate to help with portion control and meal balance *Add color to your plate    2) When you eat rice or pasta- limit to 1 cup     3) Start thinking about winter and ways to stay active- could be as easy as finding somewhere to walk indoors    - Will talk with Dr. Benavides about plan before you meet with him in a couple weeks.     Thank you,   Gini Deras, JENNIFER, LD, [facility] [place][note 2]  Outpatient Diabetes Education  Adult Diabetes Education Triage [phone]  Diabetes Scheduling [phone]

## 2023-10-10 NOTE — PROGRESS NOTES
Diabetes Self-Management Education & Support    Presents for: Follow-up    Type of Visit: In Person    How would patient like to obtain AVS? Lexi    ASSESSMENT:      Was taking Metformin for 4 weeks and then started experiencing significant nausea when he increased to 4 tabs.  Decreased to 2 tabs, and is tolerating better.  BG are not controlled as well with 2 tabs.  Reports  mg/dL yesterday when he tested.  Has meter at home, is testing periodically.  Diet is not well balanced, reviewed the My plate and portion control.  Typically only eats one meal per day and this has not changed.  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  Reviewed establishing a regular eating pattern and balancing meals/snacks to help prevent BG spikes.  Has been limiting/avoiding bread and ice cream.    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    Reviewed medication plan, pt meets with PCP in a couple weeks and plans to discuss.  Will send PCP a message, if Metformin is not tolerated and BG remains around 200 mg/dL on current dose, could try SGLT-2 medication.    Patient's most recent   Lab Results   Component Value Date    A1C 8.5 07/20/2023    A1C 6.2 11/08/2018    is not meeting goal of <7.0    Diabetes knowledge and skills assessment:   Patient is knowledgeable in diabetes management concepts related to: Monitoring, Taking Medication, and Reducing Risks    Continue education with the following diabetes management concepts: Healthy Eating, Being Active, Taking Medication, Reducing Risks, and Healthy Coping    Based on learning assessment above, most appropriate setting for further diabetes education would be: Individual  setting.    INTERVENTIONS:    Education provided today on:  AADE Self-Care Behaviors:  Care Plan: Diabetes   Updates made by Elsa Deras since 10/10/2023 12:00 AM        Problem: Diabetes Self-Management Education Needed to Optimize Self-Care Behaviors         Goal: Healthy Eating - follow a healthy eating pattern for diabetes    Start Date: 10/10/2023   This Visit's Progress: 0%   Note:    My Goal: I will incorporate more than one food group at meals and snacks    What I need to meet my goal: My plate, carbohydrate and protein food list     I plan to meet my goal by this date: 3 months        Task: Provide education on managing carbohydrate intake (carbohydrate counting, plate planning method, etc.) Completed 10/10/2023   Responsible User: Elsa Deras        Goal: Being Active - get regular physical activity, working up to at least 150 minutes per week         Task: Discuss barriers to physical activity with patient Completed 10/10/2023   Responsible User: Elsa Deras        Task: Develop physical activity plan with patient Completed 10/10/2023   Responsible User: Elsa Deras        Goal: Monitoring - monitor glucose and ketones as directed    Start Date: 8/25/2023   This Visit's Progress: 10%   Recent Progress: 0%   Note:    My Goal: I will begin testing BG 1-2 times per day     What I need to meet my goal: meter, supplies and log    I plan to meet my goal by this date: 2 months        Goal: Taking Medication - patient is consistently taking medications as directed         Task: Provide education on action of prescribed medication, including when to take and possible side effects Completed 10/10/2023   Responsible User: Elsa Deras        Goal: Reducing Risks - know how to prevent and treat long-term diabetes complications         Task: Provide education on major complications of diabetes, prevention, early diagnostic measures and treatment of  complications Completed 10/10/2023   Responsible User: Elsa Deras        Task: Provide education on recommended care for dental, eye and foot health Completed 10/10/2023   Responsible User: Elsa Deras        Goal: Healthy Coping - use available resources to cope with the challenges of managing diabetes         Task: Provide education on the benefits of making appropriate lifestyle changes Completed 10/10/2023   Responsible User: Elsa Deras          Opportunities for ongoing education and support in diabetes-self management were discussed. Pt verbalized understanding of concepts discussed and recommendations provided today.       Education Materials Provided:  Carbohydrate Counting, My Plate Planner, and snack list          PLAN    1) Try to establish regular eating pattern- eat a meal or snack every 4 hours or so   A snack should include a carbohydrate choice + protein   For meals, try to use the My plate to help with portion control and meal balance *Add color to your plate    2) When you eat rice or pasta- limit to 1 cup     3) Start thinking about winter and ways to stay active- could be as easy as finding somewhere to walk indoors    - Will talk with Dr. Benavides about plan before you meet with him in a couple weeks.     Topics to cover at upcoming visits: Healthy Eating, Monitoring, Taking Medication, and Reducing Risks  Follow-up: November 21st, 2pm- in person visit    See Goals Section for co-developed, patient-stated behavior change goals.  AVS provided to patient today.          SUBJECTIVE / OBJECTIVE:  Presents for: Follow-up  Accompanied by: Self  Diabetes education in the past 24mo: No  Diabetes type: Type 2  Disease course: Stable  How confident are you filling out medical forms by yourself:: Somewhat  Cultural Influences/Ethnic Background:  Choose not to answer      Diabetes Symptoms & Complications:  Fatigue: No  Neuropathy: No  Polydipsia: Yes  Polyphagia:  "Yes  Polyuria: Yes  Visual change: No  Symptom course: Stable  Weight trend: Stable  Complications assessed today?: No    Patient Problem List and Family Medical History reviewed for relevant medical history, current medical status, and diabetes risk factors.    Vitals:  There were no vitals taken for this visit.  Estimated body mass index is 33.62 kg/m  as calculated from the following:    Height as of 9/29/23: 1.656 m (5' 5.2\").    Weight as of 9/29/23: 92.2 kg (203 lb 4 oz).   Last 3 BP:   BP Readings from Last 3 Encounters:   09/29/23 138/84   08/21/23 134/82   07/27/23 126/82       History   Smoking Status    Never   Smokeless Tobacco    Never       Labs:  Lab Results   Component Value Date    A1C 8.5 07/20/2023    A1C 6.2 11/08/2018     Lab Results   Component Value Date     09/29/2023     11/28/2022     01/19/2021     Lab Results   Component Value Date    LDL  07/20/2023      Comment:      Cannot estimate LDL when triglyceride exceeds 400 mg/dL     07/20/2023     05/27/2022    LDL 92 01/19/2021     HDL Cholesterol   Date Value Ref Range Status   01/19/2021 34 (L) >39 mg/dL Final     Direct Measure HDL   Date Value Ref Range Status   07/20/2023 39 (L) >=40 mg/dL Final   ]  GFR Estimate   Date Value Ref Range Status   09/29/2023 43 (L) >60 mL/min/1.73m2 Final   01/19/2021 >90 >60 mL/min/[1.73_m2] Final     Comment:     Non  GFR Calc  Starting 12/18/2018, serum creatinine based estimated GFR (eGFR) will be   calculated using the Chronic Kidney Disease Epidemiology Collaboration   (CKD-EPI) equation.       GFR Estimate If Black   Date Value Ref Range Status   01/19/2021 >90 >60 mL/min/[1.73_m2] Final     Comment:      GFR Calc  Starting 12/18/2018, serum creatinine based estimated GFR (eGFR) will be   calculated using the Chronic Kidney Disease Epidemiology Collaboration   (CKD-EPI) equation.       Lab Results   Component Value Date    CR 1.82 " 09/29/2023    CR 0.89 01/19/2021     No results found for: MICROALBUMIN    Healthy Eating:  Healthy Eating Assessed Today: Yes  Cultural/Hinduism diet restrictions?: No  Meal planning/habits: None  How many times a week on average do you eat food made away from home (restaurant/take-out)?: 3  Meals include: Dinner, Evening Snack  Dinner: 2 Hot dogs and mac and cheese no bun OR [employer] roast beef, no bun OR pizza without crust  Snacks: Hot dog- no bun OR apple, grapes  Other: Not a vegetable eater  Beverages: Water, Sports drinks (Zero sugar powerade)  Has patient met with a dietitian in the past?: Yes    Being Active:  Being Active Assessed Today: Yes  Exercise:: Currently not exercising  Barrier to exercise: Time    Monitoring:  Monitoring Assessed Today: Yes  Did patient bring glucose meter to appointment? : No  Blood Glucose Meter: Accu-chek  Times checking blood sugar at home (number): 2  Times checking blood sugar at home (per): Week  Blood glucose trend: Fluctuating    Taking Medications:  Diabetes Medication(s)       Biguanides       metFORMIN (GLUCOPHAGE XR) 500 MG 24 hr tablet    Take 2 tablets (1,000 mg) by mouth daily (with dinner)          Was taking Metformin for 4 weeks and then started experiencing nausea when he increased to 4 tabs.  Decreased to 2 tabs, and is tolerating better.    Taking Medication Assessed Today: Yes  Current Treatments: Oral Medication (taken by mouth)  Problems taking diabetes medications regularly?: No  Diabetes medication side effects?: Yes    Problem Solving:  Problem Solving Assessed Today: No  Is the patient at risk for hypoglycemia?: No  Is the patient at risk for DKA?: No              Reducing Risks:  Reducing Risks Assessed Today: No  CAD Risks: Hypertension  Has dilated eye exam at least once a year?: Yes  Sees dentist every 6 months?: Yes  Feet checked by healthcare provider in the last year?: No    Healthy Coping:  Healthy Coping Assessed Today: No  Informal Support  system:: Family  Stage of change: PREPARATION (Decided to change - considering how)  Patient Activation Measure Survey Score:      11/30/2015     8:00 AM   REHANA Score (Last Two)   REHANA Raw Score 39   Activation Score 56.4   REHANA Level 3             Gini Deras RDN, LD, [facility]  Outpatient Diabetes Education  Adult Diabetes Education Triage [phone]    Time Spent: 45 minutes  Encounter Type: Individual        Any diabetes medication dose changes were made via the Certified Diabetes Care & Education Protocol in collaboration with the patient's referring provider. A copy of this encounter was shared with the provider.

## 2023-10-23 ENCOUNTER — TELEPHONE (OUTPATIENT)
Dept: FAMILY MEDICINE | Facility: CLINIC | Age: 58
End: 2023-10-23

## 2023-10-23 ENCOUNTER — OFFICE VISIT (OUTPATIENT)
Dept: FAMILY MEDICINE | Facility: CLINIC | Age: 58
End: 2023-10-23
Payer: COMMERCIAL

## 2023-10-23 VITALS
DIASTOLIC BLOOD PRESSURE: 80 MMHG | BODY MASS INDEX: 34.16 KG/M2 | TEMPERATURE: 97.1 F | HEIGHT: 65 IN | WEIGHT: 205 LBS | OXYGEN SATURATION: 97 % | HEART RATE: 79 BPM | RESPIRATION RATE: 16 BRPM | SYSTOLIC BLOOD PRESSURE: 132 MMHG

## 2023-10-23 DIAGNOSIS — I10 BENIGN ESSENTIAL HYPERTENSION: Primary | ICD-10-CM

## 2023-10-23 DIAGNOSIS — R79.89 ELEVATED SERUM CREATININE: ICD-10-CM

## 2023-10-23 DIAGNOSIS — E11.65 TYPE 2 DIABETES MELLITUS WITH HYPERGLYCEMIA, WITHOUT LONG-TERM CURRENT USE OF INSULIN (H): ICD-10-CM

## 2023-10-23 DIAGNOSIS — T88.7XXA MEDICATION SIDE EFFECT: ICD-10-CM

## 2023-10-23 DIAGNOSIS — I10 BENIGN ESSENTIAL HYPERTENSION: ICD-10-CM

## 2023-10-23 LAB
ALBUMIN SERPL BCG-MCNC: 4.2 G/DL (ref 3.5–5.2)
ALP SERPL-CCNC: 97 U/L (ref 40–129)
ALT SERPL W P-5'-P-CCNC: 32 U/L (ref 0–70)
ANION GAP SERPL CALCULATED.3IONS-SCNC: 16 MMOL/L (ref 7–15)
AST SERPL W P-5'-P-CCNC: 23 U/L (ref 0–45)
BILIRUB SERPL-MCNC: 0.3 MG/DL
BUN SERPL-MCNC: 37.8 MG/DL (ref 6–20)
CALCIUM SERPL-MCNC: 9 MG/DL (ref 8.6–10)
CHLORIDE SERPL-SCNC: 103 MMOL/L (ref 98–107)
CREAT SERPL-MCNC: 1.69 MG/DL (ref 0.67–1.17)
DEPRECATED HCO3 PLAS-SCNC: 18 MMOL/L (ref 22–29)
EGFRCR SERPLBLD CKD-EPI 2021: 47 ML/MIN/1.73M2
GLUCOSE SERPL-MCNC: 182 MG/DL (ref 70–99)
HBA1C MFR BLD: 8.6 %
POTASSIUM SERPL-SCNC: 4.4 MMOL/L (ref 3.4–5.3)
PROT SERPL-MCNC: 6.8 G/DL (ref 6.4–8.3)
SODIUM SERPL-SCNC: 137 MMOL/L (ref 135–145)

## 2023-10-23 PROCEDURE — 99207 PR FOOT EXAM NO CHARGE: CPT | Performed by: FAMILY MEDICINE

## 2023-10-23 PROCEDURE — 99214 OFFICE O/P EST MOD 30 MIN: CPT | Mod: 25 | Performed by: FAMILY MEDICINE

## 2023-10-23 PROCEDURE — 90471 IMMUNIZATION ADMIN: CPT | Performed by: FAMILY MEDICINE

## 2023-10-23 PROCEDURE — 80053 COMPREHEN METABOLIC PANEL: CPT | Performed by: FAMILY MEDICINE

## 2023-10-23 PROCEDURE — 36415 COLL VENOUS BLD VENIPUNCTURE: CPT | Performed by: FAMILY MEDICINE

## 2023-10-23 PROCEDURE — 83036 HEMOGLOBIN GLYCOSYLATED A1C: CPT | Performed by: FAMILY MEDICINE

## 2023-10-23 PROCEDURE — 90636 HEP A/HEP B VACC ADULT IM: CPT | Performed by: FAMILY MEDICINE

## 2023-10-23 RX ORDER — CARVEDILOL 3.12 MG/1
3.12 TABLET ORAL 2 TIMES DAILY WITH MEALS
Qty: 60 TABLET | Refills: 1 | Status: SHIPPED | OUTPATIENT
Start: 2023-10-23 | End: 2023-11-21

## 2023-10-23 RX ORDER — CARVEDILOL PHOSPHATE 10 MG/1
10 CAPSULE, EXTENDED RELEASE ORAL DAILY
Qty: 30 CAPSULE | Refills: 0 | Status: SHIPPED | OUTPATIENT
Start: 2023-10-23 | End: 2023-10-23

## 2023-10-23 ASSESSMENT — PAIN SCALES - GENERAL: PAINLEVEL: NO PAIN (0)

## 2023-10-23 NOTE — TELEPHONE ENCOUNTER
Central Prior Authorization Team   Phone: [phone]    PA Initiation    Medication: Carvedilol Phosphate 10 mg CP24  Insurance Company: [employer] [facility] - Phone [phone] Fax [phone]  Pharmacy Filling the Rx: [address]   Filling Pharmacy Phone: [phone]  Filling Pharmacy Fax:    Start Date: 10/23/2023

## 2023-10-23 NOTE — Clinical Note
Please abstract the following data from this visit with this patient into the appropriate field in Epic:  Tests that can be patient reported without a hard copy:  Eye exam with ophthalmology on this date: 7/2023 Exam Location: [facility] [place] - Normal  Other Tests found in the patient's chart through Chart Review/Care Everywhere:  {Abstract Quality List (Optional):314388}  Note to Abstraction: If this section is blank, no results were found via Chart Review/Care Everywhere.

## 2023-10-23 NOTE — PROGRESS NOTES
Assessment & Plan     ASSESSMENT/ORDERS:    ICD-10-CM    1. Benign essential hypertension  I10 carvedilol ER (COREG CR) 10 MG 24 hr capsule     Basic metabolic panel  (Ca, Cl, CO2, Creat, Gluc, K, Na, BUN)      2. Elevated serum creatinine  R79.89 Comprehensive metabolic panel (BMP + Alb, Alk Phos, ALT, AST, Total. Bili, TP)     Basic metabolic panel  (Ca, Cl, CO2, Creat, Gluc, K, Na, BUN)      3. Type 2 diabetes mellitus with hyperglycemia, without long-term current use of insulin (H)  E11.65 Hemoglobin A1c     FOOT EXAM     Basic metabolic panel  (Ca, Cl, CO2, Creat, Gluc, K, Na, BUN)      4. Medication side effect  T88.7XXA         PLAN:  1.  Will discontinue spironolactone due to elevated creatinine.  Started on carvidilol ER today for hypertension management.  2.  Will have him try increasing metformin ER by 1 pill/day to 2 in the AM and 1 in PM, and if that goes well over the next 2 weeks to 2 pills (1000 mg) twice daily.  If creatinine normalizes, would add GLP-1 agonist, if creatinine does not, may have some underlying chronic kidney disease so would then use SGLT2 inhibitor for secondary agent.    Follow-up in 1 month with already scheduled appointment.  Will need BMP recheck prior to that appointment.                  Minh Benavides MD  [facility] is a 58 year [facility], presenting for the following health issues:  Recheck Medication and Follow Up        10/23/2023     1:08 PM   Additional Questions   Roomed by Melissa VELASCO       History of Present Illness       Diabetes:   He presents for follow up of diabetes.  He is checking home blood glucose a few times a month.   He checks blood glucose before meals.  Blood glucose is never over 200 and never under 70. He is aware of hypoglycemia symptoms including none.    He has no concerns regarding his diabetes at this time.   He is not experiencing numbness or burning in feet, excessive thirst, blurry vision, weight  "changes or redness, sores or blisters on feet. The patient has not had a diabetic eye exam in the last 12 months.          Hypertension: He presents for follow up of hypertension.  He does not check blood pressure  regularly outside of the clinic.  He follows a low salt diet.     He eats 0-1 servings of fruits and vegetables daily.He consumes 0 sweetened beverage(s) daily.He exercises with enough effort to increase his heart rate 9 or less minutes per day.  He exercises with enough effort to increase his heart rate 5 days per week.   He is taking medications regularly.     Follow up dizziness, has got better, dropping metformin down to 2 pills has helped    Has not tried adding metfomrin PM dosing yet.    Blood pressure was not controlled on chlorthalidone and losartan; side effects on amlodipine, started on spironolactone 3 months ago.  Creatinine elevated last month.    Hemoglobin A1c not at goal today.  Working with diabeteic ed.  Suggested possbily SGLT2 inhibitor due to creatinine elevation    Review of Systems         Objective    /80   Pulse 79   Temp 97.1  F (36.2  C) (Tympanic)   Resp 16   Ht 1.656 m (5' 5.2\")   Wt 93 kg (205 lb)   SpO2 97%   BMI 33.91 kg/m    Body mass index is 33.91 kg/m .  Physical Exam  Constitutional:       Appearance: He is well-developed.   Cardiovascular:      Rate and Rhythm: Normal rate and regular rhythm.      Heart sounds: Normal heart sounds, S1 normal and S2 normal. No murmur heard.  Pulmonary:      Effort: Pulmonary effort is normal. No respiratory distress.      Breath sounds: Normal breath sounds. No wheezing, rhonchi or rales.   Musculoskeletal:      Comments: FEET:  monofilament exam normal bilaterally.  Good hair growth.  Dorsalis pedis pulses 2+ bilaterally.  Feet warm.    Feet:      Right foot:      Skin integrity: No ulcer, blister, skin breakdown or erythema.      Left foot:      Skin integrity: No ulcer, blister, skin breakdown or erythema. "   Neurological:      Mental Status: He is alert.   Psychiatric:         Behavior: Behavior is cooperative.            Results for orders placed or performed in visit on 10/23/23 (from the past 24 hour(s))   Comprehensive metabolic panel (BMP + Alb, Alk Phos, ALT, AST, Total. Bili, TP)   Result Value Ref Range    Sodium 137 135 - 145 mmol/L    Potassium 4.4 3.4 - 5.3 mmol/L    Carbon Dioxide (CO2) 18 (L) 22 - 29 mmol/L    Anion Gap 16 (H) 7 - 15 mmol/L    Urea Nitrogen 37.8 (H) 6.0 - 20.0 mg/dL    Creatinine 1.69 (H) 0.67 - 1.17 mg/dL    GFR Estimate 47 (L) >60 mL/min/1.73m2    Calcium 9.0 8.6 - 10.0 mg/dL    Chloride 103 98 - 107 mmol/L    Glucose 182 (H) 70 - 99 mg/dL    Alkaline Phosphatase 97 40 - 129 U/L    AST 23 0 - 45 U/L    ALT 32 0 - 70 U/L    Protein Total 6.8 6.4 - 8.3 g/dL    Albumin 4.2 3.5 - 5.2 g/dL    Bilirubin Total 0.3 <=1.2 mg/dL   Hemoglobin A1c   Result Value Ref Range    Hemoglobin A1C 8.6 (H) <5.7 %

## 2023-10-23 NOTE — NURSING NOTE
Prior to immunization administration, verified patients identity using patient s name and date of birth. Please see Immunization Activity for additional information.     Screening Questionnaire for Adult Immunization    Are you sick today?   No   Do you have allergies to medications, food, a vaccine component or latex?   No   Have you ever had a serious reaction after receiving a vaccination?   No   Do you have a long-term health problem with heart, lung, kidney, or metabolic disease (e.g., diabetes), asthma, a blood disorder, no spleen, complement component deficiency, a cochlear implant, or a spinal fluid leak?  Are you on long-term aspirin therapy?   No   Do you have cancer, leukemia, HIV/AIDS, or any other immune system problem?   No   Do you have a parent, brother, or sister with an immune system problem?   No   In the past 3 months, have you taken medications that affect  your immune system, such as prednisone, other steroids, or anticancer drugs; drugs for the treatment of rheumatoid arthritis, Crohn s disease, or psoriasis; or have you had radiation treatments?   No   Have you had a seizure, or a brain or other nervous system problem?   No   During the past year, have you received a transfusion of blood or blood    products, or been given immune (gamma) globulin or antiviral drug?   No   For women: Are you pregnant or is there a chance you could become       pregnant during the next month?   No   Have you received any vaccinations in the past 4 weeks?   No     Immunization questionnaire answers were all negative.      Patient instructed to remain in clinic for 15 minutes afterwards, and to report any adverse reactions.     Screening performed by Melissa Maradiaga CMA on 10/23/2023 at 2:05 PM.

## 2023-10-23 NOTE — TELEPHONE ENCOUNTER
Prior Authorization Retail Medication Request    Medication/Dose: Carvedilol Phosphate 10 mg CP24  ICD code (if different than what is on RX):    Previously Tried and Failed:    Rationale:      Insurance Name:  DIANA BETTENCOURT  Insurance ID:  [id number]      Pharmacy Information (if different than what is on RX)  Name:  [facility]  Phone:  [phone]    Thank you,   Celeste Campa, Pharmacy Tech  [facility]

## 2023-10-23 NOTE — TELEPHONE ENCOUNTER
Pharmacy would like to switch carvedilol to 3.125 mg twice daily so they do not have to wait for the prior auth.    Sharon Boudreaux RN on 10/23/2023 at 3:05 PM

## 2023-10-24 NOTE — TELEPHONE ENCOUNTER
PRIOR AUTHORIZATION DENIED    Medication: Carvedilol Phosphate 10 mg CP24    Denial Date: 10/24/2023    Denial Rational:       Appeal Information: This medication was denied. If physician would like to appeal because patient has contraindication or allergy to covered medication please write letter of medical necessity and route back to PA team to initiate.  If no further action is needed please close encounter thank you.

## 2023-11-20 NOTE — COMMUNITY RESOURCES LIST (ENGLISH)
11/20/2023   [facility]  N/A  For questions about this resource list or additional care needs, please contact your primary care clinic or care manager.  Phone: [phone]   Email: N/A   Address: [address] [place] [id number]   Hours: N/A        Housing       Shelter for families  1  [facility] Distance: 24.63 miles      In-Person   [address] [place] [address]  Language: English  Hours: Mon - Fri 8:30 AM - 4:00 PM  Fees: Free, Self Pay   Phone: (493) 215-7490 Email: [email or web address] Website: http://[employer]/     Shelter for individuals  2  [facility] Distance: 24.63 miles      In-Person   [address] [place] [address]  Language: English  Hours: Mon - Fri 8:30 AM - 4:00 PM  Fees: Free   Phone: (641) 632-4702 Email: [email or web address] Website: [email or web address]/          Important Numbers & Websites       Emergency Services   911  [facility]   311  Poison Control   (702) 735-2235  Suicide Prevention Lifeline   (256) 133-4575 (TALK)  Child Abuse Hotline   (902) 655-6731 (4-A-Child)  Sexual Assault Hotline   (904) 864-7126 (HOPE)  National Runaway Safeline   (264) 457-3336 (RUNAWAY)  All-Options Talkline   (734) 256-4484  Substance Abuse Referral   (775) 226-1604 (HELP)

## 2023-11-21 ENCOUNTER — OFFICE VISIT (OUTPATIENT)
Dept: FAMILY MEDICINE | Facility: CLINIC | Age: 58
End: 2023-11-21
Attending: FAMILY MEDICINE
Payer: COMMERCIAL

## 2023-11-21 VITALS
HEART RATE: 78 BPM | WEIGHT: 208 LBS | DIASTOLIC BLOOD PRESSURE: 80 MMHG | BODY MASS INDEX: 34.4 KG/M2 | RESPIRATION RATE: 16 BRPM | TEMPERATURE: 97.9 F | OXYGEN SATURATION: 96 % | SYSTOLIC BLOOD PRESSURE: 132 MMHG

## 2023-11-21 DIAGNOSIS — I10 BENIGN ESSENTIAL HYPERTENSION: ICD-10-CM

## 2023-11-21 DIAGNOSIS — E11.65 TYPE 2 DIABETES MELLITUS WITH HYPERGLYCEMIA, WITHOUT LONG-TERM CURRENT USE OF INSULIN (H): Primary | ICD-10-CM

## 2023-11-21 DIAGNOSIS — R79.89 ELEVATED SERUM CREATININE: ICD-10-CM

## 2023-11-21 LAB
ANION GAP SERPL CALCULATED.3IONS-SCNC: 13 MMOL/L (ref 7–15)
BUN SERPL-MCNC: 26.9 MG/DL (ref 6–20)
CALCIUM SERPL-MCNC: 9.6 MG/DL (ref 8.6–10)
CHLORIDE SERPL-SCNC: 99 MMOL/L (ref 98–107)
CREAT SERPL-MCNC: 1.56 MG/DL (ref 0.67–1.17)
DEPRECATED HCO3 PLAS-SCNC: 27 MMOL/L (ref 22–29)
EGFRCR SERPLBLD CKD-EPI 2021: 52 ML/MIN/1.73M2
GLUCOSE SERPL-MCNC: 157 MG/DL (ref 70–99)
POTASSIUM SERPL-SCNC: 3.6 MMOL/L (ref 3.4–5.3)
SODIUM SERPL-SCNC: 139 MMOL/L (ref 135–145)

## 2023-11-21 PROCEDURE — 36415 COLL VENOUS BLD VENIPUNCTURE: CPT | Performed by: FAMILY MEDICINE

## 2023-11-21 PROCEDURE — 99214 OFFICE O/P EST MOD 30 MIN: CPT | Performed by: FAMILY MEDICINE

## 2023-11-21 PROCEDURE — 80048 BASIC METABOLIC PNL TOTAL CA: CPT | Performed by: FAMILY MEDICINE

## 2023-11-21 RX ORDER — CARVEDILOL 6.25 MG/1
6.25 TABLET ORAL 2 TIMES DAILY WITH MEALS
Qty: 60 TABLET | Refills: 2 | Status: SHIPPED | OUTPATIENT
Start: 2023-11-21 | End: 2024-02-08

## 2023-11-21 RX ORDER — METFORMIN HCL 500 MG
1000 TABLET, EXTENDED RELEASE 24 HR ORAL 2 TIMES DAILY WITH MEALS
Qty: 360 TABLET | Refills: 1 | Status: SHIPPED | OUTPATIENT
Start: 2023-11-21 | End: 2024-02-08

## 2023-11-21 ASSESSMENT — PAIN SCALES - GENERAL: PAINLEVEL: NO PAIN (0)

## 2023-11-21 NOTE — PROGRESS NOTES
ASSESSMENT/ORDERS:    ICD-10-CM    1. Type 2 diabetes mellitus with hyperglycemia, without long-term current use of insulin (H)  E11.65 Basic metabolic panel  (Ca, Cl, CO2, Creat, Gluc, K, Na, BUN)     metFORMIN (GLUCOPHAGE XR) 500 MG 24 hr tablet     Hemoglobin A1c      2. Benign essential hypertension  I10 Basic metabolic panel  (Ca, Cl, CO2, Creat, Gluc, K, Na, BUN)     carvedilol (COREG) 6.25 MG tablet     Basic metabolic panel  (Ca, Cl, CO2, Creat, Gluc, K, Na, BUN)      3. Elevated serum creatinine  R79.89 Basic metabolic panel  (Ca, Cl, CO2, Creat, Gluc, K, Na, BUN)     Basic metabolic panel  (Ca, Cl, CO2, Creat, Gluc, K, Na, BUN)        PLAN:  1. Hypertension. Discussed increasing Carvedilol dose to lower blood pressure more due to decreased kidney function. Patient denied dizziness with current dose. Increased to 6.25 mg twice day.   2. Diabetes. Patient tolerating current metformin dose split up. Chart updated to indicate current dosing schedule.   3. Creatinine elevated and GFR low but improved since medication changes. Advised patient to discontinue diclofenac as it may be hard on the kidneys. Recommended taking extra strength tylenol for arthritic pain instead.  4. Patient likely recovering from cold. Provided sinus relief over the counter recommendations.  5. Follow up in 2 months to recheck A1C, Kidney Function labs and Blood pressure.     Patient was seen and examined by myself and Minh Benavides MD. The note was then scribed by me.   Bria Rangel, MS3    This patient was seen and examined by myself as well as the medical student.  The medical student scribed the note and I have reviewed it, edited it appropriately, and agree with the final documentation.     Minh Benavides MD       Randi Roberson is a 58 year old, presenting for the following health issues:  Diabetes and Hypertension        10/23/2023     1:08 PM   Additional Questions   Roomed by Melissa VELASCO       History of  Present Illness        Diabetes:   He presents for follow up of diabetes.  He is checking home blood glucose a few times a month.   He checks blood glucose before meals.  Blood glucose is never over 200 and never under 70. He is aware of hypoglycemia symptoms including none.    He has no concerns regarding his diabetes at this time.   He is not experiencing numbness or burning in feet, excessive thirst, blurry vision, weight changes or redness, sores or blisters on feet. The patient has not had a diabetic eye exam in the last 12 months.             Hypertension: He presents for follow up of hypertension.  He does not check blood pressure  regularly outside of the clinic.  He follows a low salt diet.      He eats 0-1 servings of fruits and vegetables daily.He consumes 0 sweetened beverage(s) daily.He exercises with enough effort to increase his heart rate 9 or less minutes per day.  He exercises with enough effort to increase his heart rate 5 days per week.   He is taking medications regularly.    Patient presents for a follow up of his hypertension and diabetes. He would also like to discuss a cough he has had for over a week. He notes that he seems to be improved form his worse day a week ago. He notes he will occasionally have a junky sounfing cough. He has been producing yellow sputum with coughing. His family was ill with a respiratory virus 2-3 weeks ago and they have since recovered, he is the only one with residual symptoms at this time. Nyquil helps with the cough. Denies sinus pressure, nasal discharge, fever or vomiting.    Hypertension: Patient has been taking carvedilol as prescribed. He denies dizziness or lightheadedness.  Notes he has tolerated the medication well. He has not picked up the one refill available yet.    Diabetes: Patient notes he had been able to increase to 4 pills daily: 2 in the morning, 2 at night. He notes he is tolerating the medication much better since splitting the doses. He  has some nausea when he initially increased to 4 pills and that has since resolved. He denies diarrhea with the increased dose.      Review of Systems   Constitutional, HEENT, cardiovascular, pulmonary, gi and gu systems are negative, except as otherwise noted.      Objective    /80 (Cuff Size: Adult Regular)   Pulse 78   Temp 97.9  F (36.6  C) (Temporal)   Resp 16   Wt 94.3 kg (208 lb)   SpO2 96%   BMI 34.40 kg/m    Body mass index is 34.4 kg/m .  Physical Exam   GENERAL: healthy, alert and no distress  RESP: lungs clear to auscultation - no rales, rhonchi or wheezes  CV: regular rate and rhythm, normal S1 S2, no S3 or S4, no murmur, click or rub, no peripheral edema and peripheral pulses strong    No results found for this or any previous visit (from the past 24 hour(s)).

## 2023-11-21 NOTE — PATIENT INSTRUCTIONS
Acetaminophen:  1000 mg every 6 hours.  Read directions on bottle.    1.  Saline sinus rinse (one form comes in a squirt bottle form while another kind is known as a Neti Pots).  Follow directions on package.  May do this 1-2 times per day.  2.  May also use Afrin (oxymetazoline) nasal spray for a maximum of 3 days for symptom control.  Do not use for longer than this.  A good idea is to use this medication with saline nasal irrigation.  If you choose to do this, use the Afrin about 30-45 minutes after the sinus rinse to maximize effect of medication.  3.  Flonase (fluticasone):  use as directed on package or prescription.  A good idea is to use this medication with saline nasal irrigation.  If you choose to do this, use the fluticasone about 30-45 minutes after the sinus rinse to maximize effect of medication.    4.  Antihistamines:  Daytime:  Zyrtec (cetirizine).  10 mg once to twice daily.

## 2023-12-04 NOTE — RESULT ENCOUNTER NOTE
Da,  Your results are all clinically unchanged from previous.  Please let me know if you have any questions.    Sincerely,  Dr. Benavides

## 2023-12-20 ENCOUNTER — ALLIED HEALTH/NURSE VISIT (OUTPATIENT)
Dept: EDUCATION SERVICES | Facility: CLINIC | Age: 58
End: 2023-12-20
Payer: COMMERCIAL

## 2023-12-20 DIAGNOSIS — E11.65 TYPE 2 DIABETES MELLITUS WITH HYPERGLYCEMIA, WITHOUT LONG-TERM CURRENT USE OF INSULIN (H): Primary | ICD-10-CM

## 2023-12-20 PROCEDURE — G0108 DIAB MANAGE TRN  PER INDIV: HCPCS | Mod: 93

## 2023-12-20 NOTE — LETTER
12/20/2023         RE: Da Akins  [address] [facility] [address]        Dear Colleague,    Thank you for referring your patient, Da Akins, to the [facility] DIABETES [facility]UC[facility] [place]. Please see a copy of my visit note below.    Diabetes Self-Management Education & Support    Presents for: Follow-up    Type of Visit: In Person    How would patient like to obtain AVS? Printed copy    ASSESSMENT:    Da returns to clinic today for follow up diabetes education.  Last A1C showed no change with BG.  He was able to increase Metformin back to 4 tabs per day and is tolerating better.  Has checked BG once a month since last visit.  Says BG is always around 150 mg/dL and questions reason for testing if it's always the same.   Reviewed benefits of testing in relation to understanding how food affects his BG and how medications are working.     Did stop taking arthritis medication to help with kidney function per PCP.  Will see what A1C is in February and then recommend adding SGLT-2 inhibitor.  Reviewed this with pt and he is more comfortable waiting for PCP.    Works late night shift, in the past have reviewed importance of eating more regularly than just once daily.  Reviewed snack options including carbs and protein.  Also reviewed pathophysiology of diabetes, how Metformin works and benefits of physical activity.  Reinforced Da will have to make changes in order to see improvements with A1C, cannot rely on medication alone.     Patient's most recent   Lab Results   Component Value Date    A1C 8.6 10/23/2023    A1C 6.2 11/08/2018    is not meeting goal of <7.0    Diabetes knowledge and skills assessment:   Patient is knowledgeable in diabetes management concepts related to: Taking Medication    Continue education with the following diabetes management concepts: Healthy Eating, Being Active, Monitoring, Taking Medication, Reducing Risks, and Healthy Coping    Based on learning  assessment above, most appropriate setting for further diabetes education would be: Individual setting.    INTERVENTIONS:    Education provided today on:  AADE Self-Care Behaviors:  Care Plan: Diabetes   Updates made by Elsa Deras since 12/20/2023 12:00 AM        Problem: Diabetes Self-Management Education Needed to Optimize Self-Care Behaviors         Goal: Healthy Eating - follow a healthy eating pattern for diabetes    Start Date: 10/10/2023   This Visit's Progress: 0%   Recent Progress: 0%   Note:    My Goal: I will incorporate more than one food group at meals and snacks    What I need to meet my goal: My plate, carbohydrate and protein food list     I plan to meet my goal by this date: 3 months        Goal: Monitoring - monitor glucose and ketones as directed    Start Date: 8/25/2023   This Visit's Progress: 0%   Recent Progress: 10%   Note:    My Goal: I will begin testing BG 1-2 times per day     What I need to meet my goal: meter, supplies and log    I plan to meet my goal by this date: 2 months        Goal: Taking Medication - patient is consistently taking medications as directed         Task: Discuss barriers to medication adherence with patient and provide management technique ideas as appropriate Completed 12/20/2023   Responsible User: Elsa Deras          Opportunities for ongoing education and support in diabetes-self management were discussed. Pt verbalized understanding of concepts discussed and recommendations provided today.       Education Materials Provided:  Snack ideas          PLAN    Sample 4 Carb Breakfast Choices -Carbohydrate choices found in (  )  1 cup of cooked cereal (2)   8 oz skim milk (1)  1 cup berries (1)  2 tablespoons of nuts (0)   1 small whole grain (store bought) bagel (2)  1 fried egg (0)  2 oz lean ham (0)  8 oz skim milk (1)  1 orange (1) 6 oz light yogurt (1)  1 cup berries (1)  2 slices whole grain toast (2)  1-2 Tablespoons peanut butter  (0) 1 medium whole grain tortilla (2)  1 medium banana (2)  1 tablespoon peanut butter (0)    cup cottage cheese (0)   8 oz skim milk (1) + 1 packet instant breakfast mix (1)   1 whole grapefruit (2)   2 slices whole grain toast (2)  1 cup skim milk (1)    cup grapes (1)   1 cup cubed sweet potatoes (2)  1 cup skim milk (1)  1 slice whole grain toast (1) with 1 tsp butter (0)  1 scrambled egg (0) 1 whole grain English muffin (2)  1 medium banana (2)  1 Tablespoon peanut butter (0)     4 Carbohydrate Choice Meal Plans for Lunch or Supper   Carbohydrate choices found in (   )  1 cup whole grain spaghetti (3) +   cup low sodium pasta sauce (1) + 3 oz lean ground turkey (0) + 2 cups lettuce/veggies (0) + 2 Tbsp dressing (0) 2 slices of whole grain bread (2)  3 oz lean turkey (0) + lettuce/tomato (0)  2 tsp reyna (0)  6 whole grain crackers (1)  1 small apple (1) 1 cup of low sodium broth based soup (1) +  2 slices of whole grain bread (2)   2 oz of low fat cheese (0) + 1 tsp butter (0)  Carrots/celery (0)    cup grapes (1) 2 cups lettuce salad (0) +   cup garbanzo beans (1) + +   cup tuna (0) + 2 Tablespoons low-fat vinaigrette salad dressing (0)  + 6 oz light yogurt (1) + 3 rye crackers (1) + 8 oz skim milk (1)    2 cups low sodium broth based soup (2) + 6 saltine crackers (1) + 1 small apple (1) + broccoli/cauliflower and low fat dip (0)   3 oz lean steak (0) + medium baked potato (2) + 1 tsp low fat sour cream (0) + 1 cup green beans (0) + 1 small dinner roll (1)   1 cup berries (1) + 1 tbsp light whipped cream (0) 2 cups lettuce salad (0)   3 oz grilled chicken (0)  1-2 Tsbp light Caesar dressing (0)  + 1 Tbps grated cheese (0)  1 cup grapes (2)  5 Triscuit crackers (1)  8 oz skim milk (1)   3 oz chicken (0)  1 cup sweet potato (2)  1 cup asparagus (0)  1 small apple (1)  16 oz sparkling water (unsweetened)  1-8 oz glass skim milk (1)   1 hamburger radha (0) on hamburger bun (2) + small french fries (2) + + 1 garden  salad (0) with 1 package of vinaigrette (0) + 1 cup baby carrots + 1/2 cup green beans- cooked (0) 2 slices whole grain bread (2)+ 2 oz lean ham (0) + leaf lettuce/tomato/onion (0)  2 teaspoons reyna (0)  1 medium pear (2)  1 cup raw veggie sticks (0) 3 slices thin crust pizza (3) + 2 cups lettuce salad (0) + 10 cherry tomatoes (0) + 2 Tbsp light salad dressing (0) + 1 small peach (1) 3-6  whole grain tortillas (3) +   cup fat free refried beans (1) + 3 oz shredded lean beef (0) + 2 Tsbp salsa (0)+ lettuce/tomato (0) + 1 Tbsp light sour cream (0)   1 cup low sodium chicken noodle soup (1) + 2 slices whole grain bread (2) + 2 oz lean turkey (0) + 1 oz low fat cheese (0) + 1-2 teaspoons of butter or margarine    cup peaches (1) 3 oz turkey (0) + 1 cup mashed potatoes (2) + 1 tsp butter (0) + 1 cup green beans (0) +   cup unsweetened apple sauce (1)  1-8oz  cup of skim milk (1) 3 oz chicken + 3 medium pierogis (3) + 1/3 cup cabbage (0) +   cup unsweetened applesauce (1) +   cup green beans (0) I cup of beef stroganoff (0) + 1 cup egg noodles (3) + 1 cup broccoli (0) + 1 cup carrots (0) + 1 cup of watermelon (1)       Try to incorporate physical activity- you mentioned the gym, walking paths, roller rink, community pool     Blood sugar goals:  Fasting/morning before eating goal:  mg/dL  2 hours after a meal goal is <180 mg/dL    Topics to cover at upcoming visits: Healthy Eating, Taking Medication, and Reducing Risks  Follow-up: February 13th, 3pm- in person visit    See Goals Section for co-developed, patient-stated behavior change goals.  AVS provided to patient today.          SUBJECTIVE / OBJECTIVE:  Presents for: Follow-up  Accompanied by: Self  Diabetes education in the past 24mo: Yes  Diabetes type: Type 2  Disease course: Stable  How confident are you filling out medical forms by yourself:: Extremely  Diabetes management related comments/concerns: no  Cultural Influences/Ethnic Background:  Choose not to  "answer      Diabetes Symptoms & Complications:  Fatigue: Sometimes  Neuropathy: Sometimes  Polydipsia: Sometimes  Polyphagia: Sometimes  Polyuria: Yes  Visual change: No  Slow healing wounds: No  Symptom course: Stable  Weight trend: Stable  Autonomic neuropathy: No  CVA: No  Heart disease: No  Nephropathy: No  Peripheral neuropathy: No  Peripheral Vascular Disease: No  Retinopathy: No  Sexual dysfunction: No    Patient Problem List and Family Medical History reviewed for relevant medical history, current medical status, and diabetes risk factors.    Vitals:  There were no vitals taken for this visit.  Estimated body mass index is 34.4 kg/m  as calculated from the following:    Height as of 10/23/23: 1.656 m (5' 5.2\").    Weight as of 11/21/23: 94.3 kg (208 lb).   Last 3 BP:   BP Readings from Last 3 Encounters:   11/21/23 132/80   10/23/23 132/80   09/29/23 138/84       History   Smoking Status     Never   Smokeless Tobacco     Never       Labs:  Lab Results   Component Value Date    A1C 8.6 10/23/2023    A1C 6.2 11/08/2018     Lab Results   Component Value Date     11/21/2023     11/28/2022     01/19/2021     Lab Results   Component Value Date    LDL  07/20/2023      Comment:      Cannot estimate LDL when triglyceride exceeds 400 mg/dL     07/20/2023     05/27/2022    LDL 92 01/19/2021     HDL Cholesterol   Date Value Ref Range Status   01/19/2021 34 (L) >39 mg/dL Final     Direct Measure HDL   Date Value Ref Range Status   07/20/2023 39 (L) >=40 mg/dL Final   ]  GFR Estimate   Date Value Ref Range Status   11/21/2023 52 (L) >60 mL/min/1.73m2 Final   01/19/2021 >90 >60 mL/min/[1.73_m2] Final     Comment:     Non  GFR Calc  Starting 12/18/2018, serum creatinine based estimated GFR (eGFR) will be   calculated using the Chronic Kidney Disease Epidemiology Collaboration   (CKD-EPI) equation.       GFR Estimate If Black   Date Value Ref Range Status   01/19/2021 >90 " ">60 mL/min/[1.73_m2] Final     Comment:      GFR Calc  Starting 12/18/2018, serum creatinine based estimated GFR (eGFR) will be   calculated using the Chronic Kidney Disease Epidemiology Collaboration   (CKD-EPI) equation.       Lab Results   Component Value Date    CR 1.56 11/21/2023    CR 0.89 01/19/2021     No results found for: \"MICROALBUMIN\"    Healthy Eating:  Healthy Eating Assessed Today: Yes  Cultural/[place] diet restrictions?: No  Meal planning/habits: Avoiding sweets, Low carb, Low salt  How many times a week on average do you eat food made away from home (restaurant/take-out)?: 2  Meals include: Dinner  Breakfast: 2:30/7a may have a quasadilla before bed  Dinner: pancakes and baltazar OR  Snacks: david cracker OR apple  Beverages: Water, Sports drinks (powerade zero sugar)  Has patient met with a dietitian in the past?: Yes    Being Active:  Being Active Assessed Today: Yes  Exercise:: Yes  How intense was your typical exercise? : Light (like stretching or slow walking)  Barrier to exercise: Time    Monitoring:  Monitoring Assessed Today: Yes  Did patient bring glucose meter to appointment? : No  Blood Glucose Meter: Accu-chek  Times checking blood sugar at home (number): 2  Times checking blood sugar at home (per): Month  Blood glucose trend: No change    Glucose data:  None    Taking Medications:  Diabetes Medication(s)       Biguanides       metFORMIN (GLUCOPHAGE XR) 500 MG 24 hr tablet Take 2 tablets (1,000 mg) by mouth 2 times daily (with meals)        Is tolerating 2 tabs morning and 2 in the evening.      Taking Medication Assessed Today: Yes  Current Treatments: Oral Medication (taken by mouth)  Problems taking diabetes medications regularly?: No  Diabetes medication side effects?: No    Problem Solving:  Problem Solving Assessed Today: No  Is the patient at risk for hypoglycemia?: No  Is the patient at risk for DKA?: No      Reducing Risks:  Reducing Risks Assessed Today: " No  CAD Risks: Dyslipidemia, Hypertension  Has dilated eye exam at least once a year?: Yes  Sees dentist every 6 months?: Yes  Feet checked by healthcare provider in the last year?: Yes    Healthy Coping:  Healthy Coping Assessed Today: Yes  Informal Support system:: Children, Family, Friends, Significant other  Stage of change: ACTION (Actively working towards change)  Patient Activation Measure Survey Score:      11/30/2015     8:00 AM   REHANA Score (Last Two)   REHANA Raw Score 39   Activation Score 56.4   REHANA Level 3             Gini Deras RDN, LD, [facility]  Outpatient Diabetes Education  Adult Diabetes Education Triage [phone]    Time Spent: 45 minutes  Encounter Type: Individual        Any diabetes medication dose changes were made via the Certified Diabetes Care & Education Protocol in collaboration with the patient's referring provider. A copy of this encounter was shared with the provider.

## 2023-12-20 NOTE — PATIENT INSTRUCTIONS
Sample 4 Carb Breakfast Choices -Carbohydrate choices found in (  )  1 cup of cooked cereal (2)   8 oz skim milk (1)  1 cup berries (1)  2 tablespoons of nuts (0)   1 small whole grain (store bought) bagel (2)  1 fried egg (0)  2 oz lean ham (0)  8 oz skim milk (1)  1 orange (1) 6 oz light yogurt (1)  1 cup berries (1)  2 slices whole grain toast (2)  1-2 Tablespoons peanut butter (0) 1 medium whole grain tortilla (2)  1 medium banana (2)  1 tablespoon peanut butter (0)    cup cottage cheese (0)   8 oz skim milk (1) + 1 packet instant breakfast mix (1)   1 whole grapefruit (2)   2 slices whole grain toast (2)  1 cup skim milk (1)    cup grapes (1)   1 cup cubed sweet potatoes (2)  1 cup skim milk (1)  1 slice whole grain toast (1) with 1 tsp butter (0)  1 scrambled egg (0) 1 whole grain English muffin (2)  1 medium banana (2)  1 Tablespoon peanut butter (0)     4 Carbohydrate Choice Meal Plans for Lunch or Supper   Carbohydrate choices found in (   )  1 cup whole grain spaghetti (3) +   cup low sodium pasta sauce (1) + 3 oz lean ground turkey (0) + 2 cups lettuce/veggies (0) + 2 Tbsp dressing (0) 2 slices of whole grain bread (2)  3 oz lean turkey (0) + lettuce/tomato (0)  2 tsp reyna (0)  6 whole grain crackers (1)  1 small apple (1) 1 cup of low sodium broth based soup (1) +  2 slices of whole grain bread (2)   2 oz of low fat cheese (0) + 1 tsp butter (0)  Carrots/celery (0)    cup grapes (1) 2 cups lettuce salad (0) +   cup garbanzo beans (1) + +   cup tuna (0) + 2 Tablespoons low-fat vinaigrette salad dressing (0)  + 6 oz light yogurt (1) + 3 rye crackers (1) + 8 oz skim milk (1)    2 cups low sodium broth based soup (2) + 6 saltine crackers (1) + 1 small apple (1) + broccoli/cauliflower and low fat dip (0)   3 oz lean steak (0) + medium baked potato (2) + 1 tsp low fat sour cream (0) + 1 cup green beans (0) + 1 small dinner roll (1)   1 cup berries (1) + 1 tbsp light whipped cream (0) 2 cups lettuce salad (0)    3 oz grilled chicken (0)  1-2 Tsbp light Caesar dressing (0)  + 1 Tbps grated cheese (0)  1 cup grapes (2)  5 Triscuit crackers (1)  8 oz skim milk (1)   3 oz chicken (0)  1 cup sweet potato (2)  1 cup asparagus (0)  1 small apple (1)  16 oz sparkling water (unsweetened)  1-8 oz glass skim milk (1)   1 hamburger radha (0) on hamburger bun (2) + small french fries (2) + + 1 garden salad (0) with 1 package of vinaigrette (0) + 1 cup baby carrots + 1/2 cup green beans- cooked (0) 2 slices whole grain bread (2)+ 2 oz lean ham (0) + leaf lettuce/tomato/onion (0)  2 teaspoons reyna (0)  1 medium pear (2)  1 cup raw veggie sticks (0) 3 slices thin crust pizza (3) + 2 cups lettuce salad (0) + 10 cherry tomatoes (0) + 2 Tbsp light salad dressing (0) + 1 small peach (1) 3-6  whole grain tortillas (3) +   cup fat free refried beans (1) + 3 oz shredded lean beef (0) + 2 Tsbp salsa (0)+ lettuce/tomato (0) + 1 Tbsp light sour cream (0)   1 cup low sodium chicken noodle soup (1) + 2 slices whole grain bread (2) + 2 oz lean turkey (0) + 1 oz low fat cheese (0) + 1-2 teaspoons of butter or margarine    cup peaches (1) 3 oz turkey (0) + 1 cup mashed potatoes (2) + 1 tsp butter (0) + 1 cup green beans (0) +   cup unsweetened apple sauce (1)  1-8oz  cup of skim milk (1) 3 oz chicken + 3 medium pierogis (3) + 1/3 cup cabbage (0) +   cup unsweetened applesauce (1) +   cup green beans (0) I cup of beef stroganoff (0) + 1 cup egg noodles (3) + 1 cup broccoli (0) + 1 cup carrots (0) + 1 cup of watermelon (1)       Try to incorporate physical activity- you mentioned the gym, walking paths, roller rink, community pool     Blood sugar goals:  Fasting/morning before eating goal:  mg/dL  2 hours after a meal goal is <180 mg/dL    Thank you,   Gini Deras, MICHELINEN, LD, [facility]  Outpatient Diabetes Education  Adult Diabetes Education Triage [phone]

## 2023-12-20 NOTE — PROGRESS NOTES
Diabetes Self-Management Education & Support    Presents for: Follow-up    Type of Visit: In Person    How would patient like to obtain AVS? Printed copy    ASSESSMENT:    Da returns to clinic today for follow up diabetes education.  Last A1C showed no change with BG.  He was able to increase Metformin back to 4 tabs per day and is tolerating better.  Has checked BG once a month since last visit.  Says BG is always around 150 mg/dL and questions reason for testing if it's always the same.   Reviewed benefits of testing in relation to understanding how food affects his BG and how medications are working.     Did stop taking arthritis medication to help with kidney function per PCP.  Will see what A1C is in February and then recommend adding SGLT-2 inhibitor.  Reviewed this with pt and he is more comfortable waiting for PCP.    Works late night shift, in the past have reviewed importance of eating more regularly than just once daily.  Reviewed snack options including carbs and protein.  Also reviewed pathophysiology of diabetes, how Metformin works and benefits of physical activity.  Reinforced Da will have to make changes in order to see improvements with A1C, cannot rely on medication alone.     Patient's most recent   Lab Results   Component Value Date    A1C 8.6 10/23/2023    A1C 6.2 11/08/2018    is not meeting goal of <7.0    Diabetes knowledge and skills assessment:   Patient is knowledgeable in diabetes management concepts related to: Taking Medication    Continue education with the following diabetes management concepts: Healthy Eating, Being Active, Monitoring, Taking Medication, Reducing Risks, and Healthy Coping    Based on learning assessment above, most appropriate setting for further diabetes education would be: Individual setting.    INTERVENTIONS:    Education provided today on:  AADE Self-Care Behaviors:  Care Plan: Diabetes   Updates made by Elsa Deras since 12/20/2023 12:00 AM         Problem: Diabetes Self-Management Education Needed to Optimize Self-Care Behaviors         Goal: Healthy Eating - follow a healthy eating pattern for diabetes    Start Date: 10/10/2023   This Visit's Progress: 0%   Recent Progress: 0%   Note:    My Goal: I will incorporate more than one food group at meals and snacks    What I need to meet my goal: My plate, carbohydrate and protein food list     I plan to meet my goal by this date: 3 months        Goal: Monitoring - monitor glucose and ketones as directed    Start Date: 8/25/2023   This Visit's Progress: 0%   Recent Progress: 10%   Note:    My Goal: I will begin testing BG 1-2 times per day     What I need to meet my goal: meter, supplies and log    I plan to meet my goal by this date: 2 months        Goal: Taking Medication - patient is consistently taking medications as directed         Task: Discuss barriers to medication adherence with patient and provide management technique ideas as appropriate Completed 12/20/2023   Responsible User: Elsa Deras          Opportunities for ongoing education and support in diabetes-self management were discussed. Pt verbalized understanding of concepts discussed and recommendations provided today.       Education Materials Provided:  Snack ideas          PLAN    Sample 4 Carb Breakfast Choices -Carbohydrate choices found in (  )  1 cup of cooked cereal (2)   8 oz skim milk (1)  1 cup berries (1)  2 tablespoons of nuts (0)   1 small whole grain (store bought) bagel (2)  1 fried egg (0)  2 oz lean ham (0)  8 oz skim milk (1)  1 orange (1) 6 oz light yogurt (1)  1 cup berries (1)  2 slices whole grain toast (2)  1-2 Tablespoons peanut butter (0) 1 medium whole grain tortilla (2)  1 medium banana (2)  1 tablespoon peanut butter (0)    cup cottage cheese (0)   8 oz skim milk (1) + 1 packet instant breakfast mix (1)   1 whole grapefruit (2)   2 slices whole grain toast (2)  1 cup skim milk (1)    cup grapes (1)    1 cup cubed sweet potatoes (2)  1 cup skim milk (1)  1 slice whole grain toast (1) with 1 tsp butter (0)  1 scrambled egg (0) 1 whole grain English muffin (2)  1 medium banana (2)  1 Tablespoon peanut butter (0)     4 Carbohydrate Choice Meal Plans for Lunch or Supper   Carbohydrate choices found in (   )  1 cup whole grain spaghetti (3) +   cup low sodium pasta sauce (1) + 3 oz lean ground turkey (0) + 2 cups lettuce/veggies (0) + 2 Tbsp dressing (0) 2 slices of whole grain bread (2)  3 oz lean turkey (0) + lettuce/tomato (0)  2 tsp reyna (0)  6 whole grain crackers (1)  1 small apple (1) 1 cup of low sodium broth based soup (1) +  2 slices of whole grain bread (2)   2 oz of low fat cheese (0) + 1 tsp butter (0)  Carrots/celery (0)    cup grapes (1) 2 cups lettuce salad (0) +   cup garbanzo beans (1) + +   cup tuna (0) + 2 Tablespoons low-fat vinaigrette salad dressing (0)  + 6 oz light yogurt (1) + 3 rye crackers (1) + 8 oz skim milk (1)    2 cups low sodium broth based soup (2) + 6 saltine crackers (1) + 1 small apple (1) + broccoli/cauliflower and low fat dip (0)   3 oz lean steak (0) + medium baked potato (2) + 1 tsp low fat sour cream (0) + 1 cup green beans (0) + 1 small dinner roll (1)   1 cup berries (1) + 1 tbsp light whipped cream (0) 2 cups lettuce salad (0)   3 oz grilled chicken (0)  1-2 Tsbp light Caesar dressing (0)  + 1 Tbps grated cheese (0)  1 cup grapes (2)  5 Triscuit crackers (1)  8 oz skim milk (1)   3 oz chicken (0)  1 cup sweet potato (2)  1 cup asparagus (0)  1 small apple (1)  16 oz sparkling water (unsweetened)  1-8 oz glass skim milk (1)   1 hamburger radha (0) on hamburger bun (2) + small french fries (2) + + 1 garden salad (0) with 1 package of vinaigrette (0) + 1 cup baby carrots + 1/2 cup green beans- cooked (0) 2 slices whole grain bread (2)+ 2 oz lean ham (0) + leaf lettuce/tomato/onion (0)  2 teaspoons reyna (0)  1 medium pear (2)  1 cup raw veggie sticks (0) 3 slices thin crust  pizza (3) + 2 cups lettuce salad (0) + 10 cherry tomatoes (0) + 2 Tbsp light salad dressing (0) + 1 small peach (1) 3-6  whole grain tortillas (3) +   cup fat free refried beans (1) + 3 oz shredded lean beef (0) + 2 Tsbp salsa (0)+ lettuce/tomato (0) + 1 Tbsp light sour cream (0)   1 cup low sodium chicken noodle soup (1) + 2 slices whole grain bread (2) + 2 oz lean turkey (0) + 1 oz low fat cheese (0) + 1-2 teaspoons of butter or margarine    cup peaches (1) 3 oz turkey (0) + 1 cup mashed potatoes (2) + 1 tsp butter (0) + 1 cup green beans (0) +   cup unsweetened apple sauce (1)  1-8oz  cup of skim milk (1) 3 oz chicken + 3 medium pierogis (3) + 1/3 cup cabbage (0) +   cup unsweetened applesauce (1) +   cup green beans (0) I cup of beef stroganoff (0) + 1 cup egg noodles (3) + 1 cup broccoli (0) + 1 cup carrots (0) + 1 cup of watermelon (1)       Try to incorporate physical activity- you mentioned the gym, walking paths, roller rink, community pool     Blood sugar goals:  Fasting/morning before eating goal:  mg/dL  2 hours after a meal goal is <180 mg/dL    Topics to cover at upcoming visits: Healthy Eating, Taking Medication, and Reducing Risks  Follow-up: February 13th, 3pm- in person visit    See Goals Section for co-developed, patient-stated behavior change goals.  AVS provided to patient today.          SUBJECTIVE / OBJECTIVE:  Presents for: Follow-up  Accompanied by: Self  Diabetes education in the past 24mo: Yes  Diabetes type: Type 2  Disease course: Stable  How confident are you filling out medical forms by yourself:: Extremely  Diabetes management related comments/concerns: no  Cultural Influences/Ethnic Background:  Choose not to answer      Diabetes Symptoms & Complications:  Fatigue: Sometimes  Neuropathy: Sometimes  Polydipsia: Sometimes  Polyphagia: Sometimes  Polyuria: Yes  Visual change: No  Slow healing wounds: No  Symptom course: Stable  Weight trend: Stable  Autonomic neuropathy: No  CVA:  "No  Heart disease: No  Nephropathy: No  Peripheral neuropathy: No  Peripheral Vascular Disease: No  Retinopathy: No  Sexual dysfunction: No    Patient Problem List and Family Medical History reviewed for relevant medical history, current medical status, and diabetes risk factors.    Vitals:  There were no vitals taken for this visit.  Estimated body mass index is 34.4 kg/m  as calculated from the following:    Height as of 10/23/23: 1.656 m (5' 5.2\").    Weight as of 11/21/23: 94.3 kg (208 lb).   Last 3 BP:   BP Readings from Last 3 Encounters:   11/21/23 132/80   10/23/23 132/80   09/29/23 138/84       History   Smoking Status    Never   Smokeless Tobacco    Never       Labs:  Lab Results   Component Value Date    A1C 8.6 10/23/2023    A1C 6.2 11/08/2018     Lab Results   Component Value Date     11/21/2023     11/28/2022     01/19/2021     Lab Results   Component Value Date    LDL  07/20/2023      Comment:      Cannot estimate LDL when triglyceride exceeds 400 mg/dL     07/20/2023     05/27/2022    LDL 92 01/19/2021     HDL Cholesterol   Date Value Ref Range Status   01/19/2021 34 (L) >39 mg/dL Final     Direct Measure HDL   Date Value Ref Range Status   07/20/2023 39 (L) >=40 mg/dL Final   ]  GFR Estimate   Date Value Ref Range Status   11/21/2023 52 (L) >60 mL/min/1.73m2 Final   01/19/2021 >90 >60 mL/min/[1.73_m2] Final     Comment:     Non  GFR Calc  Starting 12/18/2018, serum creatinine based estimated GFR (eGFR) will be   calculated using the Chronic Kidney Disease Epidemiology Collaboration   (CKD-EPI) equation.       GFR Estimate If Black   Date Value Ref Range Status   01/19/2021 >90 >60 mL/min/[1.73_m2] Final     Comment:      GFR Calc  Starting 12/18/2018, serum creatinine based estimated GFR (eGFR) will be   calculated using the Chronic Kidney Disease Epidemiology Collaboration   (CKD-EPI) equation.       Lab Results   Component Value " "Date    CR 1.56 11/21/2023    CR 0.89 01/19/2021     No results found for: \"MICROALBUMIN\"    Healthy Eating:  Healthy Eating Assessed Today: Yes  Cultural/[place] diet restrictions?: No  Meal planning/habits: Avoiding sweets, Low carb, Low salt  How many times a week on average do you eat food made away from home (restaurant/take-out)?: 2  Meals include: Dinner  Breakfast: 2:30/7a may have a quasadilla before bed  Dinner: pancakes and baltazar OR  Snacks: david cracker OR apple  Beverages: Water, Sports drinks (powerade zero sugar)  Has patient met with a dietitian in the past?: Yes    Being Active:  Being Active Assessed Today: Yes  Exercise:: Yes  How intense was your typical exercise? : Light (like stretching or slow walking)  Barrier to exercise: Time    Monitoring:  Monitoring Assessed Today: Yes  Did patient bring glucose meter to appointment? : No  Blood Glucose Meter: Accu-chek  Times checking blood sugar at home (number): 2  Times checking blood sugar at home (per): Month  Blood glucose trend: No change    Glucose data:  None    Taking Medications:  Diabetes Medication(s)       Biguanides       metFORMIN (GLUCOPHAGE XR) 500 MG 24 hr tablet Take 2 tablets (1,000 mg) by mouth 2 times daily (with meals)        Is tolerating 2 tabs morning and 2 in the evening.      Taking Medication Assessed Today: Yes  Current Treatments: Oral Medication (taken by mouth)  Problems taking diabetes medications regularly?: No  Diabetes medication side effects?: No    Problem Solving:  Problem Solving Assessed Today: No  Is the patient at risk for hypoglycemia?: No  Is the patient at risk for DKA?: No      Reducing Risks:  Reducing Risks Assessed Today: No  CAD Risks: Dyslipidemia, Hypertension  Has dilated eye exam at least once a year?: Yes  Sees dentist every 6 months?: Yes  Feet checked by healthcare provider in the last year?: Yes    Healthy Coping:  Healthy Coping Assessed Today: Yes  Informal Support system:: Children, " Family, Friends, Significant other  Stage of change: ACTION (Actively working towards change)  Patient Activation Measure Survey Score:      11/30/2015     8:00 AM   REHANA Score (Last Two)   REHANA Raw Score 39   Activation Score 56.4   REHANA Level 3             Gini Deras RDN, LD, [facility]  Outpatient Diabetes Education  Adult Diabetes Education Triage [phone]    Time Spent: 45 minutes  Encounter Type: Individual        Any diabetes medication dose changes were made via the Certified Diabetes Care & Education Protocol in collaboration with the patient's referring provider. A copy of this encounter was shared with the provider.

## 2024-02-08 ENCOUNTER — OFFICE VISIT (OUTPATIENT)
Dept: FAMILY MEDICINE | Facility: CLINIC | Age: 59
End: 2024-02-08
Payer: COMMERCIAL

## 2024-02-08 VITALS
HEART RATE: 68 BPM | RESPIRATION RATE: 18 BRPM | OXYGEN SATURATION: 97 % | HEIGHT: 65 IN | BODY MASS INDEX: 33.41 KG/M2 | WEIGHT: 200.5 LBS | TEMPERATURE: 97.1 F | SYSTOLIC BLOOD PRESSURE: 124 MMHG | DIASTOLIC BLOOD PRESSURE: 70 MMHG

## 2024-02-08 DIAGNOSIS — B35.6 TINEA CRURIS: ICD-10-CM

## 2024-02-08 DIAGNOSIS — I10 BENIGN ESSENTIAL HYPERTENSION: ICD-10-CM

## 2024-02-08 DIAGNOSIS — E78.2 MIXED HYPERLIPIDEMIA: ICD-10-CM

## 2024-02-08 DIAGNOSIS — R79.89 ELEVATED SERUM CREATININE: ICD-10-CM

## 2024-02-08 DIAGNOSIS — E11.9 TYPE 2 DIABETES MELLITUS WITHOUT COMPLICATION, WITHOUT LONG-TERM CURRENT USE OF INSULIN (H): ICD-10-CM

## 2024-02-08 LAB
ANION GAP SERPL CALCULATED.3IONS-SCNC: 11 MMOL/L (ref 7–15)
BUN SERPL-MCNC: 17.1 MG/DL (ref 6–20)
CALCIUM SERPL-MCNC: 9.4 MG/DL (ref 8.6–10)
CHLORIDE SERPL-SCNC: 102 MMOL/L (ref 98–107)
CREAT SERPL-MCNC: 1.14 MG/DL (ref 0.67–1.17)
DEPRECATED HCO3 PLAS-SCNC: 27 MMOL/L (ref 22–29)
EGFRCR SERPLBLD CKD-EPI 2021: 75 ML/MIN/1.73M2
GLUCOSE SERPL-MCNC: 128 MG/DL (ref 70–99)
HBA1C MFR BLD: 7.5 %
POTASSIUM SERPL-SCNC: 3.9 MMOL/L (ref 3.4–5.3)
SODIUM SERPL-SCNC: 140 MMOL/L (ref 135–145)

## 2024-02-08 PROCEDURE — 36415 COLL VENOUS BLD VENIPUNCTURE: CPT | Performed by: FAMILY MEDICINE

## 2024-02-08 PROCEDURE — 80048 BASIC METABOLIC PNL TOTAL CA: CPT | Performed by: FAMILY MEDICINE

## 2024-02-08 PROCEDURE — 83036 HEMOGLOBIN GLYCOSYLATED A1C: CPT | Performed by: FAMILY MEDICINE

## 2024-02-08 PROCEDURE — 99214 OFFICE O/P EST MOD 30 MIN: CPT | Performed by: FAMILY MEDICINE

## 2024-02-08 RX ORDER — ATORVASTATIN CALCIUM 40 MG/1
40 TABLET, FILM COATED ORAL DAILY
Qty: 90 TABLET | Refills: 4 | Status: SHIPPED | OUTPATIENT
Start: 2024-02-08 | End: 2024-08-08

## 2024-02-08 RX ORDER — METFORMIN HCL 500 MG
1000 TABLET, EXTENDED RELEASE 24 HR ORAL 2 TIMES DAILY WITH MEALS
Qty: 360 TABLET | Refills: 1 | Status: SHIPPED | OUTPATIENT
Start: 2024-02-08 | End: 2024-08-08

## 2024-02-08 RX ORDER — LOSARTAN POTASSIUM 100 MG/1
100 TABLET ORAL DAILY
Qty: 90 TABLET | Refills: 4 | Status: SHIPPED | OUTPATIENT
Start: 2024-02-08 | End: 2024-08-08

## 2024-02-08 RX ORDER — KETOCONAZOLE 20 MG/G
CREAM TOPICAL DAILY
Qty: 60 G | Refills: 3 | Status: SHIPPED | OUTPATIENT
Start: 2024-02-08

## 2024-02-08 RX ORDER — CARVEDILOL 6.25 MG/1
6.25 TABLET ORAL 2 TIMES DAILY WITH MEALS
Qty: 60 TABLET | Refills: 2 | Status: SHIPPED | OUTPATIENT
Start: 2024-02-08 | End: 2024-05-14

## 2024-02-08 RX ORDER — CHLORTHALIDONE 25 MG/1
25 TABLET ORAL DAILY
Qty: 90 TABLET | Refills: 4 | Status: SHIPPED | OUTPATIENT
Start: 2024-02-08 | End: 2024-08-08

## 2024-02-08 ASSESSMENT — PAIN SCALES - GENERAL: PAINLEVEL: MILD PAIN (2)

## 2024-02-08 NOTE — PROGRESS NOTES
"  Assessment & Plan     ASSESSMENT/ORDERS:    ICD-10-CM    1. Type 2 diabetes mellitus without complication, without long-term current use of insulin (H)  E11.9 Hemoglobin A1c     metFORMIN (GLUCOPHAGE XR) 500 MG 24 hr tablet      2. Benign essential hypertension  I10 Basic metabolic panel  (Ca, Cl, CO2, Creat, Gluc, K, Na, BUN)     losartan (COZAAR) 100 MG tablet     chlorthalidone (HYGROTON) 25 MG tablet     carvedilol (COREG) 6.25 MG tablet      3. Elevated serum creatinine  R79.89 Basic metabolic panel  (Ca, Cl, CO2, Creat, Gluc, K, Na, BUN)      4. Tinea cruris  B35.6 ketoconazole (NIZORAL) 2 % external cream      5. Mixed hyperlipidemia  E78.2 atorvastatin (LIPITOR) 40 MG tablet        PLAN:    Medications refilled for all other above noted stable conditions.  Labs reviewed as noted below     Follow-up Visit   Expected date:  Aug 08, 2024 (Approximate)      Follow Up Appointment Details:     Follow-up with whom?: Me    Follow-Up for what?: Adult Preventive    Any Additional Chronic Condition Management?:  Diabetes  Hypertension       How?: In Person                              BMI  Estimated body mass index is 33 kg/m  as calculated from the following:    Height as of this encounter: 1.66 m (5' 5.35\").    Weight as of this encounter: 90.9 kg (200 lb 8 oz).             Randi Roberson is a 58 year old, presenting for the following health issues:  Hypertension and Diabetes      2/8/2024     2:07 PM   Additional Questions   Roomed by Tessa VELASCO     History of Present Illness       Diabetes:   He presents for follow up of diabetes.  He is checking home blood glucose a few times a month.   He checks blood glucose before meals.  Blood glucose is never over 200 and never under 70. He is aware of hypoglycemia symptoms including none.    He has no concerns regarding his diabetes at this time.   He is not experiencing numbness or burning in feet, excessive thirst, blurry vision, weight changes or redness, sores or " "blisters on feet.           Hypertension: He presents for follow up of hypertension.  He does not check blood pressure  regularly outside of the clinic. Outpatient blood pressures have not been over 140/90. He follows a low salt diet.     He eats 0-1 servings of fruits and vegetables daily.He consumes 0 sweetened beverage(s) daily.He exercises with enough effort to increase his heart rate 9 or less minutes per day.  He exercises with enough effort to increase his heart rate 3 or less days per week.   He is taking medications regularly.               Blood sugars not checked in a month.  A1c reviewed and better.      Kidney function improved with stopping NSAID.  Continues on metformin.    Blood pressure improved with increased coreg dose.          Objective    /70   Pulse 68   Temp 97.1  F (36.2  C) (Temporal)   Resp 18   Ht 1.66 m (5' 5.35\")   Wt 90.9 kg (200 lb 8 oz)   SpO2 97%   BMI 33.00 kg/m    Body mass index is 33 kg/m .  Physical Exam  Constitutional:       Appearance: Normal appearance. He is well-developed.   Cardiovascular:      Rate and Rhythm: Normal rate and regular rhythm.      Heart sounds: Normal heart sounds, S1 normal and S2 normal. No murmur heard.  Pulmonary:      Effort: Pulmonary effort is normal. No respiratory distress.      Breath sounds: Normal breath sounds. No wheezing, rhonchi or rales.   Musculoskeletal:      Right lower leg: No edema.      Left lower leg: No edema.   Neurological:      Mental Status: He is alert.            Results for orders placed or performed in visit on 02/08/24 (from the past 24 hour(s))   Basic metabolic panel  (Ca, Cl, CO2, Creat, Gluc, K, Na, BUN)   Result Value Ref Range    Sodium 140 135 - 145 mmol/L    Potassium 3.9 3.4 - 5.3 mmol/L    Chloride 102 98 - 107 mmol/L    Carbon Dioxide (CO2) 27 22 - 29 mmol/L    Anion Gap 11 7 - 15 mmol/L    Urea Nitrogen 17.1 6.0 - 20.0 mg/dL    Creatinine 1.14 0.67 - 1.17 mg/dL    GFR Estimate 75 >60 " mL/min/1.73m2    Calcium 9.4 8.6 - 10.0 mg/dL    Glucose 128 (H) 70 - 99 mg/dL   Hemoglobin A1c   Result Value Ref Range    Hemoglobin A1C 7.5 (H) <5.7 %           Signed Electronically by: Minh Benavides MD

## 2024-02-15 ENCOUNTER — VIRTUAL VISIT (OUTPATIENT)
Dept: EDUCATION SERVICES | Facility: CLINIC | Age: 59
End: 2024-02-15
Payer: COMMERCIAL

## 2024-02-15 DIAGNOSIS — E11.65 TYPE 2 DIABETES MELLITUS WITH HYPERGLYCEMIA, WITHOUT LONG-TERM CURRENT USE OF INSULIN (H): Primary | ICD-10-CM

## 2024-02-15 PROCEDURE — 98966 PH1 ASSMT&MGMT NQHP 5-10: CPT | Mod: 95

## 2024-02-15 NOTE — PATIENT INSTRUCTIONS
1) Continue to monitor blood sugars     2) Continue Metformin    3) Increase physical activity as able

## 2024-02-15 NOTE — LETTER
2/15/2024         RE: Da Akins  [address] [facility] [address]        Dear Colleague,    Thank you for referring your patient, Da Akins, to the [facility] DIABETES EDUCATION [place]. Please see a copy of my visit note below.    Diabetes Self-Management Education & Support    Presents for: Follow-up    Type of service:  Video Visit    If the video visit is dropped, the video visit invitation should be resent by: Text to cell phone: [phone]    Originating Location (pt. Location): Home  Distant Location (provider location): Offsite  Mode of Communication:  Video Conference via Mobicow    Video Start Time:  1:30pm  Video End Time (time video stopped): 1:40pm    How would patient like to obtain AVS? MyChart      ASSESSMENT:  Follow up with Da today.  He says he is happy with recent improvement in A1C and Glucose.  He says PCP was also happy and recommended no change to medications.   Da is trying to include fruits daily.  Often snacks on celery and peanut butter lately.  Beverages are overall sugar-free.   He expresses understanding and his goal is to try to increase physical activity, recently went swimming in a hotel and that went well.  Is restricted by hip pain.    Patient's most recent   Lab Results   Component Value Date    A1C 7.5 02/08/2024    A1C 6.2 11/08/2018     is not meeting goal of <7.0-trending toward goal.    Diabetes knowledge and skills assessment:   Patient is knowledgeable in diabetes management concepts related to: Healthy Eating, Being Active, Monitoring, Taking Medication, and Reducing Risks    Continue education with the following diabetes management concepts: Healthy Eating, Taking Medication, Reducing Risks, and Healthy Coping    Based on learning assessment above, most appropriate setting for further diabetes education would be: Individual setting.      PLAN    1) Continue to monitor blood sugars     2) Continue Metformin    3) Increase  "physical activity as able    Topics to cover at upcoming visits: Healthy Eating, Reducing Risks, and Healthy Coping    Follow-up: As needed    See Care Plan for co-developed, patient-state behavior change goals.  AVS provided for patient today.    Education Materials Provided:  No new materials provided today      SUBJECTIVE/OBJECTIVE:  Presents for: Follow-up  Accompanied by: Self  Diabetes education in the past 24mo: Yes  Diabetes type: Type 2  Disease course: Stable  How confident are you filling out medical forms by yourself:: Extremely  Diabetes management related comments/concerns: no  Cultural Influences/Ethnic Background:  Choose not to answer      Diabetes Symptoms & Complications:  Diabetes Related Symptoms: None  Weight trend: Stable  Symptom course: Stable  Disease course: Stable  Autonomic neuropathy: No  CVA: No  Heart disease: No  Nephropathy: No  Peripheral neuropathy: No  Peripheral Vascular Disease: No  Retinopathy: No  Sexual dysfunction: No    Patient Problem List and Family Medical History reviewed for relevant medical history, current medical status, and diabetes risk factors.    Vitals:  There were no vitals taken for this visit.  Estimated body mass index is 33 kg/m  as calculated from the following:    Height as of 2/8/24: 1.66 m (5' 5.35\").    Weight as of 2/8/24: 90.9 kg (200 lb 8 oz).   Last 3 BP:   BP Readings from Last 3 Encounters:   02/08/24 124/70   11/21/23 132/80   10/23/23 132/80       History   Smoking Status     Never   Smokeless Tobacco     Never       Labs:  Lab Results   Component Value Date    A1C 7.5 02/08/2024    A1C 6.2 11/08/2018     Lab Results   Component Value Date     02/08/2024     11/28/2022     01/19/2021     Lab Results   Component Value Date    LDL  07/20/2023      Comment:      Cannot estimate LDL when triglyceride exceeds 400 mg/dL     07/20/2023     05/27/2022    LDL 92 01/19/2021     HDL Cholesterol   Date Value Ref Range " "Status   01/19/2021 34 (L) >39 mg/dL Final     Direct Measure HDL   Date Value Ref Range Status   07/20/2023 39 (L) >=40 mg/dL Final   ]  GFR Estimate   Date Value Ref Range Status   02/08/2024 75 >60 mL/min/1.73m2 Final   01/19/2021 >90 >60 mL/min/[1.73_m2] Final     Comment:     Non  GFR Calc  Starting 12/18/2018, serum creatinine based estimated GFR (eGFR) will be   calculated using the Chronic Kidney Disease Epidemiology Collaboration   (CKD-EPI) equation.       GFR Estimate If Black   Date Value Ref Range Status   01/19/2021 >90 >60 mL/min/[1.73_m2] Final     Comment:      GFR Calc  Starting 12/18/2018, serum creatinine based estimated GFR (eGFR) will be   calculated using the Chronic Kidney Disease Epidemiology Collaboration   (CKD-EPI) equation.       Lab Results   Component Value Date    CR 1.14 02/08/2024    CR 0.89 01/19/2021     No results found for: \"MICROALBUMIN\"    Healthy Eating:  Healthy Eating Assessed Today: Yes  Cultural/Mosque diet restrictions?: No  Meal planning/habits: Avoiding sweets, Low carb, Low salt  How many times a week on average do you eat food made away from home (restaurant/take-out)?: 2  Meals include: Dinner, Breakfast  Breakfast: 2:30/7a may have a quasadilla before bed  Dinner: pancakes and baltazar OR  Snacks: david cracker OR apple OR celery with pb  Beverages: Water, Sports drinks (powerade zero sugar)  Has patient met with a dietitian in the past?: Yes    Being Active:  Being Active Assessed Today: Yes  Exercise:: Yes  How intense was your typical exercise? : Light (like stretching or slow walking)  Barrier to exercise: Time    Monitoring:  Monitoring Assessed Today: Yes  Did patient bring glucose meter to appointment? : No  Blood Glucose Meter: Accu-chek  Times checking blood sugar at home (number): 2  Times checking blood sugar at home (per): Month  Blood glucose trend: No change        Taking Medications:  Diabetes Medication(s)       " Biguanides       metFORMIN (GLUCOPHAGE XR) 500 MG 24 hr tablet Take 2 tablets (1,000 mg) by mouth 2 times daily (with meals)            Taking Medication Assessed Today: Yes  Current Treatments: Oral Medication (taken by mouth)  Problems taking diabetes medications regularly?: No  Diabetes medication side effects?: No    Problem Solving:  Problem Solving Assessed Today: No  Is the patient at risk for hypoglycemia?: No  Patient carries a carbohydrate source: No  Is the patient at risk for DKA?: No    Hypoglycemia Symptoms  Hypoglycemia: None    Hypoglycemia Complications  Hypoglycemia Complications: None    Reducing Risks:  Reducing Risks Assessed Today: No  CAD Risks: Dyslipidemia, Hypertension  Has dilated eye exam at least once a year?: Yes  Sees dentist every 6 months?: Yes  Feet checked by healthcare provider in the last year?: Yes    Healthy Coping:  Healthy Coping Assessed Today: Yes  Informal Support system:: Children, Family, Friends, Significant other  Stage of change: ACTION (Actively working towards change)  Patient Activation Measure Survey Score:      11/30/2015     8:00 AM   REHANA Score (Last Two)   REHANA Raw Score 39   Activation Score 56.4   REHANA Level 3         Care Plan and Education Provided:  Care Plan: Diabetes   Updates made by Elsa Deras since 2/15/2024 12:00 AM        Problem: Diabetes Self-Management Education Needed to Optimize Self-Care Behaviors         Goal: Healthy Eating - follow a healthy eating pattern for diabetes    Start Date: 10/10/2023   This Visit's Progress: On track   Recent Progress: 0%   Note:    My Goal: I will incorporate more than one food group at meals and snacks    What I need to meet my goal: My plate, carbohydrate and protein food list     I plan to meet my goal by this date: 3 months          Gini Deras RDN, LASHAWN, [facility]  Outpatient Diabetes Education   Adult Diabetes Education Triage [phone]      Time Spent: 10 minutes  Encounter Type:  Individual    Any diabetes medication dose changes were made via the CDE Protocol per the patient's referring provider. A copy of this encounter was shared with the provider.

## 2024-02-15 NOTE — PROGRESS NOTES
Diabetes Self-Management Education & Support    Presents for: Follow-up    Type of service:  Video Visit    If the video visit is dropped, the video visit invitation should be resent by: Text to cell phone: [phone]    Originating Location (pt. Location): Home  Distant Location (provider location): Offsite  Mode of Communication:  Video Conference via Crashmob    Video Start Time:  1:30pm  Video End Time (time video stopped): 1:40pm    How would patient like to obtain AVS? Olivert      ASSESSMENT:  Follow up with Da today.  He says he is happy with recent improvement in A1C and Glucose.  He says PCP was also happy and recommended no change to medications.   Da is trying to include fruits daily.  Often snacks on celery and peanut butter lately.  Beverages are overall sugar-free.   He expresses understanding and his goal is to try to increase physical activity, recently went swimming in a hotel and that went well.  Is restricted by hip pain.    Patient's most recent   Lab Results   Component Value Date    A1C 7.5 02/08/2024    A1C 6.2 11/08/2018     is not meeting goal of <7.0-trending toward goal.    Diabetes knowledge and skills assessment:   Patient is knowledgeable in diabetes management concepts related to: Healthy Eating, Being Active, Monitoring, Taking Medication, and Reducing Risks    Continue education with the following diabetes management concepts: Healthy Eating, Taking Medication, Reducing Risks, and Healthy Coping    Based on learning assessment above, most appropriate setting for further diabetes education would be: Individual setting.      PLAN    1) Continue to monitor blood sugars     2) Continue Metformin    3) Increase physical activity as able    Topics to cover at upcoming visits: Healthy Eating, Reducing Risks, and Healthy Coping    Follow-up: As needed    See Care Plan for co-developed, patient-state behavior change goals.  AVS provided for patient today.    Education Materials  "Provided:  No new materials provided today      SUBJECTIVE/OBJECTIVE:  Presents for: Follow-up  Accompanied by: Self  Diabetes education in the past 24mo: Yes  Diabetes type: Type 2  Disease course: Stable  How confident are you filling out medical forms by yourself:: Extremely  Diabetes management related comments/concerns: no  Cultural Influences/Ethnic Background:  Choose not to answer      Diabetes Symptoms & Complications:  Diabetes Related Symptoms: None  Weight trend: Stable  Symptom course: Stable  Disease course: Stable  Autonomic neuropathy: No  CVA: No  Heart disease: No  Nephropathy: No  Peripheral neuropathy: No  Peripheral Vascular Disease: No  Retinopathy: No  Sexual dysfunction: No    Patient Problem List and Family Medical History reviewed for relevant medical history, current medical status, and diabetes risk factors.    Vitals:  There were no vitals taken for this visit.  Estimated body mass index is 33 kg/m  as calculated from the following:    Height as of 2/8/24: 1.66 m (5' 5.35\").    Weight as of 2/8/24: 90.9 kg (200 lb 8 oz).   Last 3 BP:   BP Readings from Last 3 Encounters:   02/08/24 124/70   11/21/23 132/80   10/23/23 132/80       History   Smoking Status    Never   Smokeless Tobacco    Never       Labs:  Lab Results   Component Value Date    A1C 7.5 02/08/2024    A1C 6.2 11/08/2018     Lab Results   Component Value Date     02/08/2024     11/28/2022     01/19/2021     Lab Results   Component Value Date    LDL  07/20/2023      Comment:      Cannot estimate LDL when triglyceride exceeds 400 mg/dL     07/20/2023     05/27/2022    LDL 92 01/19/2021     HDL Cholesterol   Date Value Ref Range Status   01/19/2021 34 (L) >39 mg/dL Final     Direct Measure HDL   Date Value Ref Range Status   07/20/2023 39 (L) >=40 mg/dL Final   ]  GFR Estimate   Date Value Ref Range Status   02/08/2024 75 >60 mL/min/1.73m2 Final   01/19/2021 >90 >60 mL/min/[1.73_m2] Final     " "Comment:     Non  GFR Calc  Starting 12/18/2018, serum creatinine based estimated GFR (eGFR) will be   calculated using the Chronic Kidney Disease Epidemiology Collaboration   (CKD-EPI) equation.       GFR Estimate If Black   Date Value Ref Range Status   01/19/2021 >90 >60 mL/min/[1.73_m2] Final     Comment:      GFR Calc  Starting 12/18/2018, serum creatinine based estimated GFR (eGFR) will be   calculated using the Chronic Kidney Disease Epidemiology Collaboration   (CKD-EPI) equation.       Lab Results   Component Value Date    CR 1.14 02/08/2024    CR 0.89 01/19/2021     No results found for: \"MICROALBUMIN\"    Healthy Eating:  Healthy Eating Assessed Today: Yes  Cultural/Denominational diet restrictions?: No  Meal planning/habits: Avoiding sweets, Low carb, Low salt  How many times a week on average do you eat food made away from home (restaurant/take-out)?: 2  Meals include: Dinner, Breakfast  Breakfast: 2:30/7a may have a quasadilla before bed  Dinner: pancakes and baltazar OR  Snacks: david cracker OR apple OR celery with pb  Beverages: Water, Sports drinks (powerade zero sugar)  Has patient met with a dietitian in the past?: Yes    Being Active:  Being Active Assessed Today: Yes  Exercise:: Yes  How intense was your typical exercise? : Light (like stretching or slow walking)  Barrier to exercise: Time    Monitoring:  Monitoring Assessed Today: Yes  Did patient bring glucose meter to appointment? : No  Blood Glucose Meter: Accu-chek  Times checking blood sugar at home (number): 2  Times checking blood sugar at home (per): Month  Blood glucose trend: No change        Taking Medications:  Diabetes Medication(s)       Biguanides       metFORMIN (GLUCOPHAGE XR) 500 MG 24 hr tablet Take 2 tablets (1,000 mg) by mouth 2 times daily (with meals)            Taking Medication Assessed Today: Yes  Current Treatments: Oral Medication (taken by mouth)  Problems taking diabetes medications " regularly?: No  Diabetes medication side effects?: No    Problem Solving:  Problem Solving Assessed Today: No  Is the patient at risk for hypoglycemia?: No  Patient carries a carbohydrate source: No  Is the patient at risk for DKA?: No    Hypoglycemia Symptoms  Hypoglycemia: None    Hypoglycemia Complications  Hypoglycemia Complications: None    Reducing Risks:  Reducing Risks Assessed Today: No  CAD Risks: Dyslipidemia, Hypertension  Has dilated eye exam at least once a year?: Yes  Sees dentist every 6 months?: Yes  Feet checked by healthcare provider in the last year?: Yes    Healthy Coping:  Healthy Coping Assessed Today: Yes  Informal Support system:: Children, Family, Friends, Significant other  Stage of change: ACTION (Actively working towards change)  Patient Activation Measure Survey Score:      11/30/2015     8:00 AM   REHANA Score (Last Two)   REHANA Raw Score 39   Activation Score 56.4   REHANA Level 3         Care Plan and Education Provided:  Care Plan: Diabetes   Updates made by Elsa Deras since 2/15/2024 12:00 AM        Problem: Diabetes Self-Management Education Needed to Optimize Self-Care Behaviors         Goal: Healthy Eating - follow a healthy eating pattern for diabetes    Start Date: 10/10/2023   This Visit's Progress: On track   Recent Progress: 0%   Note:    My Goal: I will incorporate more than one food group at meals and snacks    What I need to meet my goal: My plate, carbohydrate and protein food list     I plan to meet my goal by this date: 3 months          Gini Deras RDN, LD, [facility]  Outpatient Diabetes Education   Adult Diabetes Education Triage [phone]      Time Spent: 10 minutes  Encounter Type: Individual    Any diabetes medication dose changes were made via the CDE Protocol per the patient's referring provider. A copy of this encounter was shared with the provider.

## 2024-04-19 ENCOUNTER — VIRTUAL VISIT (OUTPATIENT)
Dept: FAMILY MEDICINE | Facility: CLINIC | Age: 59
End: 2024-04-19
Payer: COMMERCIAL

## 2024-04-19 DIAGNOSIS — J20.9 ACUTE BRONCHITIS WITH SYMPTOMS > 10 DAYS: Primary | ICD-10-CM

## 2024-04-19 PROCEDURE — 99214 OFFICE O/P EST MOD 30 MIN: CPT | Mod: 95 | Performed by: NURSE PRACTITIONER

## 2024-04-19 RX ORDER — AZITHROMYCIN 250 MG/1
TABLET, FILM COATED ORAL
Qty: 6 TABLET | Refills: 0 | Status: SHIPPED | OUTPATIENT
Start: 2024-04-19 | End: 2024-04-24

## 2024-04-19 RX ORDER — GUAIFENESIN 600 MG/1
600 TABLET, EXTENDED RELEASE ORAL 2 TIMES DAILY
Qty: 30 TABLET | Refills: 0 | Status: SHIPPED | OUTPATIENT
Start: 2024-04-19 | End: 2024-05-04

## 2024-04-19 RX ORDER — IPRATROPIUM BROMIDE 21 UG/1
2 SPRAY, METERED NASAL EVERY 12 HOURS
Qty: 30 ML | Refills: 1 | Status: SHIPPED | OUTPATIENT
Start: 2024-04-19

## 2024-04-19 ASSESSMENT — ENCOUNTER SYMPTOMS: COUGH: 1

## 2024-04-19 NOTE — PROGRESS NOTES
Da is a 59 year old who is being evaluated via a billable video visit.    How would you like to obtain your AVS? MyChart  If the video visit is dropped, the invitation should be resent by: Send to e-mail at: [email or web address]  Will anyone else be joining your video visit? No      Assessment & Plan     Da was seen today for recheck medication and cough.    Diagnoses and all orders for this visit:    Acute bronchitis with symptoms > 10 days  -     ipratropium (ATROVENT) 0.03 % nasal spray; Spray 2 sprays into both nostrils every 12 hours  -     guaiFENesin (MUCINEX) 600 MG 12 hr tablet; Take 1 tablet (600 mg) by mouth 2 times daily for 15 days  -     azithromycin (ZITHROMAX) 250 MG tablet; Take 2 tablets (500 mg) by mouth daily for 1 day, THEN 1 tablet (250 mg) daily for 4 days.    Ongoing productive cough for 2 weeks. Denies fever, chills, shortness of breath or wheezing. No hx of asthma.  He has DM2- I think is reasonable to start treatment with antibiotics given the persistency of symptoms and he is at higher risk of infection. Will also add expectorant and Atrovent for post-nasal drip for 2 weeks. Has hx or seasonal allergies and taking daily loratadine.  If symptoms worsen or fail to improve in the next 5-7 days follow up with PCP  Call 911 anytime you think you may need emergency care. For example, call if:    You have severe trouble breathing.   Call your doctor now or seek immediate medical care if:    You cough up blood.     You have new or worse trouble breathing.     You have a new or higher fever.     You have a new rash.   Watch closely for changes in your health, and be sure to contact your doctor if:    You cough more deeply or more often, especially if you notice more mucus or a change in the color of your mucus.     You have new symptoms, such as a sore throat, an earache, or sinus pain.     You do not get better as expected.               BMI  Estimated body mass index is 33 kg/m  as  "calculated from the following:    Height as of 2/8/24: 1.66 m (5' 5.35\").    Weight as of 2/8/24: 90.9 kg (200 lb 8 oz).             Randi Roberson is a 59 year old, presenting for the following health issues:  Recheck Medication and Cough (For 2 weeks productive cough)      4/19/2024     7:40 AM   Additional Questions   Roomed by mili brambila     Video Start Time:   Persistent productive cough ongoing for 2 weeks  Denies fever,chills, shortness of breath, or wheezing  Has been taking coricidin at HS -cough is worse at HS    Cough    History of Present Illness       Reason for visit:  Cough, runny nose  Symptom onset:  1-2 weeks ago  Symptoms include:  Ruuny nose, cough  Symptom intensity:  Moderate  Symptom progression:  Worsening  Had these symptoms before:  No  What makes it worse:  No  What makes it better:  No    He eats 2-3 servings of fruits and vegetables daily.He consumes 0 sweetened beverage(s) daily.He exercises with enough effort to increase his heart rate 30 to 60 minutes per day.  He exercises with enough effort to increase his heart rate 4 days per week.   He is taking medications regularly.                   Objective    Vitals - Patient Reported  Weight (Patient Reported): 89.4 kg (197 lb)  Pain Score: No Pain (0)        Physical Exam   GENERAL: alert and no distress  EYES: Eyes grossly normal to inspection.  No discharge or erythema, or obvious scleral/conjunctival abnormalities.  RESP: No audible wheeze, cough, or visible cyanosis.    NEURO: Cranial nerves grossly intact.  Mentation and speech appropriate for age.  PSYCH: Appropriate affect, tone, and pace of words          Video-Visit Details    Type of service:  Video Visit   Originating Location (pt. Location): Home    Distant Location (provider location):  On-site  Platform used for Video Visit: Fabiano  Signed Electronically by: SEVEN Garcia CNP    "

## 2024-05-14 DIAGNOSIS — I10 BENIGN ESSENTIAL HYPERTENSION: ICD-10-CM

## 2024-05-14 RX ORDER — CARVEDILOL 6.25 MG/1
6.25 TABLET ORAL 2 TIMES DAILY WITH MEALS
Qty: 60 TABLET | Refills: 2 | Status: SHIPPED | OUTPATIENT
Start: 2024-05-14 | End: 2024-08-08

## 2024-05-26 ENCOUNTER — HEALTH MAINTENANCE LETTER (OUTPATIENT)
Age: 59
End: 2024-05-26

## 2024-06-06 ENCOUNTER — TELEPHONE (OUTPATIENT)
Dept: ORTHOPEDICS | Facility: CLINIC | Age: 59
End: 2024-06-06
Payer: COMMERCIAL

## 2024-06-06 DIAGNOSIS — M16.11 PRIMARY OSTEOARTHRITIS OF RIGHT HIP: Primary | ICD-10-CM

## 2024-06-07 NOTE — PROGRESS NOTES
Da Akins  :  1965  DOS: 2024  MRN: [id number]    Sports Medicine Clinic Procedure    Ultrasound Guided Right Intra-Articular Hip Injection    Clinical History: Primary osteoarthritis of right hip    Diagnosis:   1. Primary osteoarthritis of right hip      Referring Physician: Dr. Ronald Vieyra  Intraarticular Hip Injection - Ultrasound Guided  The patient was informed of the risks and the benefits of the procedure and a written consent was signed.  The patient s hip was prepped with chlorhexidine in sterile fashion.   Local anesthesia was performed using a 25-gauge 1.5-inch needle to administer 3 mL of 1% lidocaine without epinephrine.  1 mL (40 mg/mL) of triamcinolone suspension was drawn up into a 10 mL syringe with 3 mL of 1% lidocaine and 3 mL of 0.5% bupivacaine.   Injection was performed using sterile technique.  Under ultrasound guidance a 3.5-inch 22-gauge needle was used to enter the femoracetabular joint.  Anterior approach was used, needle placement was visualized and documented with ultrasound.  Ultrasound visualization was necessary due to decreased joint space in the setting of osteoarthritis.  Injection performed in-plane to the probe.  Injection solution visualized within the joint space.  Images were permanently stored for the patient's record.  There were no complications. The patient tolerated the procedure well. There was negligible bleeding.     Large Joint Injection/Arthocentesis: R hip joint    Date/Time: 2024 1:09 PM    Performed by: Cyrus Sanchez DO  Authorized by: Cyrus Sanchez DO    Indications:  Pain  Needle Size:  22 G  Guidance: ultrasound    Approach:  Anterior  Location:  Hip      Site:  R hip joint  Medications:  40 mg triamcinolone 40 MG/ML; 3 mL lidocaine 1 %; 3 mL BUPivacaine 0.5 %  Outcome:  Tolerated well, no immediate complications  Procedure discussed: discussed risks, benefits, and alternatives    Consent Given by:  Patient  Prep: patient was  prepped and draped in usual sterile fashion     3 mL 1% Lidocaine used for anesthetic     Ultrasound images of procedure were permanently stored.           Impression:  Successful intra-articular right hip joint corticosteroid injection under ultrasound guidance.    Plan:  - Injection:    - Expectations and goals of the injection were discussed and verbal and written consent was obtained.  - Performed a corticosteroid injection of the right hip joint today in clinic. Patient tolerated the procedure well without complications.    - Post-procedure instructions:    - Keep the injection site clean and dry.   - Do not submerge the injection site for 24 hours (no baths, pools). Showers are ok.   - Rest the area for 24-48 hours before resuming normal activities. Avoid overexerting the area for the first few weeks.   - It may take 2-3 days to start noticing the effects of the injection and up to 3-4 weeks to feel significant benefits.   - Follow up:          - With Dr. Vieyra as recommended for updates to treatment plan, or sooner for new/worsening symptoms.  - Patient has clinic contact information for questions or concerns.      Cyrus Sanchez DO, CAQSM  [facility] - Sports Medicine  [facility] - Department of Orthopedic Surgery     Disclaimer:  This note was prepared and written using Dragon Medical dictation software. As a result, there may be errors in the script that have gone undetected. Please consider this when interpreting the information in this note.

## 2024-06-12 ENCOUNTER — OFFICE VISIT (OUTPATIENT)
Dept: ORTHOPEDICS | Facility: CLINIC | Age: 59
End: 2024-06-12
Payer: COMMERCIAL

## 2024-06-12 DIAGNOSIS — M16.11 PRIMARY OSTEOARTHRITIS OF RIGHT HIP: Primary | ICD-10-CM

## 2024-06-12 PROCEDURE — 20611 DRAIN/INJ JOINT/BURSA W/US: CPT | Mod: RT | Performed by: STUDENT IN AN ORGANIZED HEALTH CARE EDUCATION/TRAINING PROGRAM

## 2024-06-12 RX ORDER — LIDOCAINE HYDROCHLORIDE 10 MG/ML
3 INJECTION, SOLUTION INFILTRATION; PERINEURAL
Status: DISCONTINUED | OUTPATIENT
Start: 2024-06-12 | End: 2024-08-08

## 2024-06-12 RX ORDER — TRIAMCINOLONE ACETONIDE 40 MG/ML
40 INJECTION, SUSPENSION INTRA-ARTICULAR; INTRAMUSCULAR
Status: DISCONTINUED | OUTPATIENT
Start: 2024-06-12 | End: 2024-08-08

## 2024-06-12 RX ORDER — BUPIVACAINE HYDROCHLORIDE 5 MG/ML
3 INJECTION, SOLUTION PERINEURAL
Status: DISCONTINUED | OUTPATIENT
Start: 2024-06-12 | End: 2024-08-08

## 2024-06-12 RX ADMIN — BUPIVACAINE HYDROCHLORIDE 3 ML: 5 INJECTION, SOLUTION PERINEURAL at 13:09

## 2024-06-12 RX ADMIN — LIDOCAINE HYDROCHLORIDE 3 ML: 10 INJECTION, SOLUTION INFILTRATION; PERINEURAL at 13:09

## 2024-06-12 RX ADMIN — TRIAMCINOLONE ACETONIDE 40 MG: 40 INJECTION, SUSPENSION INTRA-ARTICULAR; INTRAMUSCULAR at 13:09

## 2024-06-12 NOTE — LETTER
2024      Da Akins  [address] [address] [place] [id number]      Dear Colleague,    Thank you for referring your patient, Da Akins, to the [facility] [place]. Please see a copy of my visit note below.    Da Akins  :  1965  DOS: 2024  MRN: [id number]    Sports Medicine Clinic Procedure    Ultrasound Guided Right Intra-Articular Hip Injection    Clinical History: Primary osteoarthritis of right hip    Diagnosis:   1. Primary osteoarthritis of right hip      Referring Physician: Dr. Ronald Vieyra  Intraarticular Hip Injection - Ultrasound Guided  The patient was informed of the risks and the benefits of the procedure and a written consent was signed.  The patient s hip was prepped with chlorhexidine in sterile fashion.   Local anesthesia was performed using a 25-gauge 1.5-inch needle to administer 3 mL of 1% lidocaine without epinephrine.  1 mL (40 mg/mL) of triamcinolone suspension was drawn up into a 10 mL syringe with 3 mL of 1% lidocaine and 3 mL of 0.5% bupivacaine.   Injection was performed using sterile technique.  Under ultrasound guidance a 3.5-inch 22-gauge needle was used to enter the femoracetabular joint.  Anterior approach was used, needle placement was visualized and documented with ultrasound.  Ultrasound visualization was necessary due to decreased joint space in the setting of osteoarthritis.  Injection performed in-plane to the probe.  Injection solution visualized within the joint space.  Images were permanently stored for the patient's record.  There were no complications. The patient tolerated the procedure well. There was negligible bleeding.     Large Joint Injection/Arthocentesis: R hip joint    Date/Time: 2024 1:09 PM    Performed by: Cyrus Sanchez DO  Authorized by: Cyrus Sanchez DO    Indications:  Pain  Needle Size:  22 G  Guidance: ultrasound    Approach:  Anterior  Location:  Hip      Site:  R hip  joint  Medications:  40 mg triamcinolone 40 MG/ML; 3 mL lidocaine 1 %; 3 mL BUPivacaine 0.5 %  Outcome:  Tolerated well, no immediate complications  Procedure discussed: discussed risks, benefits, and alternatives    Consent Given by:  Patient  Prep: patient was prepped and draped in usual sterile fashion     3 mL 1% Lidocaine used for anesthetic     Ultrasound images of procedure were permanently stored.           Impression:  Successful intra-articular right hip joint corticosteroid injection under ultrasound guidance.    Plan:  - Injection:    - Expectations and goals of the injection were discussed and verbal and written consent was obtained.  - Performed a corticosteroid injection of the right hip joint today in clinic. Patient tolerated the procedure well without complications.    - Post-procedure instructions:    - Keep the injection site clean and dry.   - Do not submerge the injection site for 24 hours (no baths, pools). Showers are ok.   - Rest the area for 24-48 hours before resuming normal activities. Avoid overexerting the area for the first few weeks.   - It may take 2-3 days to start noticing the effects of the injection and up to 3-4 weeks to feel significant benefits.   - Follow up:          - With Dr. Vieyra as recommended for updates to treatment plan, or sooner for new/worsening symptoms.  - Patient has clinic contact information for questions or concerns.      Cyrus Sanchez DO, CAQSM  [facility] - Sports Medicine  [facility] - Department of Orthopedic Surgery     Disclaimer:  This note was prepared and written using Dragon Medical dictation software. As a result, there may be errors in the script that have gone undetected. Please consider this when interpreting the information in this note.       Again, thank you for allowing me to participate in the care of your patient.        Sincerely,        Cyrus Sanchez DO

## 2024-08-01 ENCOUNTER — TELEPHONE (OUTPATIENT)
Dept: ORTHOPEDICS | Facility: CLINIC | Age: 59
End: 2024-08-01
Payer: COMMERCIAL

## 2024-08-01 NOTE — TELEPHONE ENCOUNTER
[facility] Call Center    Phone Message    May a detailed message be left on voicemail: yes     Reason for Call: Other: Wondering how far out surgery is and prefer to be scheduled in November.     Action Taken: Other: Orthopedics    Travel Screening: Not Applicable     Date of Service:

## 2024-08-02 NOTE — TELEPHONE ENCOUNTER
Spoke with patient. Reports having a right hip injection with Dr. Thacker on 6/12/24. It did a good job taking the pain away, but now the pain as returned. Patient would like to scheduled an appointment with Dr. Vieyra to discuss right hip arthroplasty. I assisted in scheduling such visit while telling him that at this moment, if surgery is recommended, there should be openings in November 2024.    Rasheeda Lazar RN on 8/2/2024 at 10:54 AM

## 2024-08-07 SDOH — HEALTH STABILITY: PHYSICAL HEALTH: ON AVERAGE, HOW MANY DAYS PER WEEK DO YOU ENGAGE IN MODERATE TO STRENUOUS EXERCISE (LIKE A BRISK WALK)?: 1 DAY

## 2024-08-07 SDOH — HEALTH STABILITY: PHYSICAL HEALTH: ON AVERAGE, HOW MANY MINUTES DO YOU ENGAGE IN EXERCISE AT THIS LEVEL?: 20 MIN

## 2024-08-07 ASSESSMENT — SOCIAL DETERMINANTS OF HEALTH (SDOH): HOW OFTEN DO YOU GET TOGETHER WITH FRIENDS OR RELATIVES?: ONCE A WEEK

## 2024-08-08 ENCOUNTER — OFFICE VISIT (OUTPATIENT)
Dept: FAMILY MEDICINE | Facility: CLINIC | Age: 59
End: 2024-08-08
Attending: FAMILY MEDICINE
Payer: COMMERCIAL

## 2024-08-08 VITALS
TEMPERATURE: 97.3 F | WEIGHT: 199.13 LBS | DIASTOLIC BLOOD PRESSURE: 70 MMHG | SYSTOLIC BLOOD PRESSURE: 112 MMHG | RESPIRATION RATE: 16 BRPM | OXYGEN SATURATION: 98 % | HEIGHT: 66 IN | HEART RATE: 68 BPM | BODY MASS INDEX: 32 KG/M2

## 2024-08-08 DIAGNOSIS — E78.2 MIXED HYPERLIPIDEMIA: ICD-10-CM

## 2024-08-08 DIAGNOSIS — I10 BENIGN ESSENTIAL HYPERTENSION: ICD-10-CM

## 2024-08-08 DIAGNOSIS — Z00.00 ROUTINE GENERAL MEDICAL EXAMINATION AT A HEALTH CARE FACILITY: Primary | ICD-10-CM

## 2024-08-08 DIAGNOSIS — E11.9 TYPE 2 DIABETES MELLITUS WITHOUT COMPLICATION, WITHOUT LONG-TERM CURRENT USE OF INSULIN (H): ICD-10-CM

## 2024-08-08 LAB
CHOLEST SERPL-MCNC: 153 MG/DL
CREAT UR-MCNC: 208.2 MG/DL
FASTING STATUS PATIENT QL REPORTED: YES
HBA1C MFR BLD: 7.9 %
HDLC SERPL-MCNC: 32 MG/DL
LDLC SERPL CALC-MCNC: 42 MG/DL
MICROALBUMIN UR-MCNC: 14.4 MG/L
MICROALBUMIN/CREAT UR: 6.92 MG/G CR (ref 0–17)
NONHDLC SERPL-MCNC: 121 MG/DL
TRIGL SERPL-MCNC: 396 MG/DL

## 2024-08-08 PROCEDURE — 99214 OFFICE O/P EST MOD 30 MIN: CPT | Mod: 25 | Performed by: FAMILY MEDICINE

## 2024-08-08 PROCEDURE — 83036 HEMOGLOBIN GLYCOSYLATED A1C: CPT | Performed by: FAMILY MEDICINE

## 2024-08-08 PROCEDURE — 80061 LIPID PANEL: CPT | Performed by: FAMILY MEDICINE

## 2024-08-08 PROCEDURE — 82570 ASSAY OF URINE CREATININE: CPT | Performed by: FAMILY MEDICINE

## 2024-08-08 PROCEDURE — 36415 COLL VENOUS BLD VENIPUNCTURE: CPT | Performed by: FAMILY MEDICINE

## 2024-08-08 PROCEDURE — 90636 HEP A/HEP B VACC ADULT IM: CPT | Performed by: FAMILY MEDICINE

## 2024-08-08 PROCEDURE — 90471 IMMUNIZATION ADMIN: CPT | Performed by: FAMILY MEDICINE

## 2024-08-08 PROCEDURE — 99396 PREV VISIT EST AGE 40-64: CPT | Mod: 25 | Performed by: FAMILY MEDICINE

## 2024-08-08 PROCEDURE — 82043 UR ALBUMIN QUANTITATIVE: CPT | Performed by: FAMILY MEDICINE

## 2024-08-08 RX ORDER — METFORMIN HCL 500 MG
1000 TABLET, EXTENDED RELEASE 24 HR ORAL 2 TIMES DAILY WITH MEALS
Qty: 360 TABLET | Refills: 1 | Status: SHIPPED | OUTPATIENT
Start: 2024-08-08

## 2024-08-08 RX ORDER — ATORVASTATIN CALCIUM 40 MG/1
40 TABLET, FILM COATED ORAL DAILY
Qty: 90 TABLET | Refills: 4 | Status: SHIPPED | OUTPATIENT
Start: 2024-08-08

## 2024-08-08 RX ORDER — CHLORTHALIDONE 25 MG/1
25 TABLET ORAL DAILY
Qty: 90 TABLET | Refills: 4 | Status: SHIPPED | OUTPATIENT
Start: 2024-08-08

## 2024-08-08 RX ORDER — CARVEDILOL 6.25 MG/1
6.25 TABLET ORAL 2 TIMES DAILY WITH MEALS
Qty: 180 TABLET | Refills: 4 | Status: SHIPPED | OUTPATIENT
Start: 2024-08-08

## 2024-08-08 RX ORDER — LOSARTAN POTASSIUM 100 MG/1
100 TABLET ORAL DAILY
Qty: 90 TABLET | Refills: 4 | Status: SHIPPED | OUTPATIENT
Start: 2024-08-08

## 2024-08-08 NOTE — NURSING NOTE
Prior to immunization administration, verified patients identity using patient s name and date of birth. Please see Immunization Activity for additional information.     Screening Questionnaire for Adult Immunization    Are you sick today?   No   Do you have allergies to medications, food, a vaccine component or latex?   No   Have you ever had a serious reaction after receiving a vaccination?   No   Do you have a long-term health problem with heart, lung, kidney, or metabolic disease (e.g., diabetes), asthma, a blood disorder, no spleen, complement component deficiency, a cochlear implant, or a spinal fluid leak?  Are you on long-term aspirin therapy?   No   Do you have cancer, leukemia, HIV/AIDS, or any other immune system problem?   No   Do you have a parent, brother, or sister with an immune system problem?   No   In the past 3 months, have you taken medications that affect  your immune system, such as prednisone, other steroids, or anticancer drugs; drugs for the treatment of rheumatoid arthritis, Crohn s disease, or psoriasis; or have you had radiation treatments?   No   Have you had a seizure, or a brain or other nervous system problem?   No   During the past year, have you received a transfusion of blood or blood    products, or been given immune (gamma) globulin or antiviral drug?   No   For women: Are you pregnant or is there a chance you could become       pregnant during the next month?   No   Have you received any vaccinations in the past 4 weeks?   No     Immunization questionnaire answers were all negative.      Patient instructed to remain in clinic for 15 minutes afterwards, and to report any adverse reactions.     Screening performed by Melissa Maradiaga CMA on 8/8/2024 at 2:53 PM.

## 2024-08-08 NOTE — PATIENT INSTRUCTIONS
Patient Education   Preventive Care Advice   This is general advice given by our system to help you stay healthy. However, your care team may have specific advice just for you. Please talk to your care team about your preventive care needs.  Nutrition  Eat 5 or more servings of fruits and vegetables each day.  Try wheat bread, brown rice and whole grain pasta (instead of white bread, rice, and pasta).  Get enough calcium and vitamin D. Check the label on foods and aim for 100% of the RDA (recommended daily allowance).  Lifestyle  Exercise at least 150 minutes each week  (30 minutes a day, 5 days a week).  Do muscle strengthening activities 2 days a week. These help control your weight and prevent disease.  No smoking.  Wear sunscreen to prevent skin cancer.  Have a dental exam and cleaning every 6 months.  Yearly exams  See your health care team every year to talk about:  Any changes in your health.  Any medicines your care team has prescribed.  Preventive care, family planning, and ways to prevent chronic diseases.  Shots (vaccines)   HPV shots (up to age 26), if you've never had them before.  Hepatitis B shots (up to age 59), if you've never had them before.  COVID-19 shot: Get this shot when it's due.  Flu shot: Get a flu shot every year.  Tetanus shot: Get a tetanus shot every 10 years.  Pneumococcal, hepatitis A, and RSV shots: Ask your care team if you need these based on your risk.  Shingles shot (for age 50 and up)  General health tests  Diabetes screening:  Starting at age 35, Get screened for diabetes at least every 3 years.  If you are younger than age 35, ask your care team if you should be screened for diabetes.  Cholesterol test: At age 39, start having a cholesterol test every 5 years, or more often if advised.  Bone density scan (DEXA): At age 50, ask your care team if you should have this scan for osteoporosis (brittle bones).  Hepatitis C: Get tested at least once in your life.  STIs (sexually  transmitted infections)  Before age 24: Ask your care team if you should be screened for STIs.  After age 24: Get screened for STIs if you're at risk. You are at risk for STIs (including HIV) if:  You are sexually active with more than one person.  You don't use condoms every time.  You or a partner was diagnosed with a sexually transmitted infection.  If you are at risk for HIV, ask about PrEP medicine to prevent HIV.  Get tested for HIV at least once in your life, whether you are at risk for HIV or not.  Cancer screening tests  Cervical cancer screening: If you have a cervix, begin getting regular cervical cancer screening tests starting at age 21.  Breast cancer scan (mammogram): If you've ever had breasts, begin having regular mammograms starting at age 40. This is a scan to check for breast cancer.  Colon cancer screening: It is important to start screening for colon cancer at age 45.  Have a colonoscopy test every 10 years (or more often if you're at risk) Or, ask your provider about stool tests like a FIT test every year or Cologuard test every 3 years.  To learn more about your testing options, visit:   .  For help making a decision, visit:   https://bit.ly/ge89052.  Prostate cancer screening test: If you have a prostate, ask your care team if a prostate cancer screening test (PSA) at age 55 is right for you.  Lung cancer screening: If you are a current or former smoker ages 50 to 80, ask your care team if ongoing lung cancer screenings are right for you.  For informational purposes only. Not to replace the advice of your health care provider. Copyright   2023 [employer]. All rights reserved. Clinically reviewed by the [facility]. Fio [id number] - REV 01/24.

## 2024-08-08 NOTE — PROGRESS NOTES
"Preventive Care Visit  [facility]  Minh Benavides MD, Family Medicine  Aug 8, 2024      Assessment & Plan     ASSESSMENT/ORDERS:    ICD-10-CM    1. Routine general medical examination at a health care facility  Z00.00       2. Type 2 diabetes mellitus without complication, without long-term current use of insulin (H)  E11.9 HEMOGLOBIN A1C     Lipid panel reflex to direct LDL Fasting     Albumin Random Urine Quantitative with Creat Ratio     metFORMIN (GLUCOPHAGE XR) 500 MG 24 hr tablet      3. Benign essential hypertension  I10 carvedilol (COREG) 6.25 MG tablet     chlorthalidone (HYGROTON) 25 MG tablet     losartan (COZAAR) 100 MG tablet      4. Mixed hyperlipidemia  E78.2 atorvastatin (LIPITOR) 40 MG tablet        PLAN:    Medications refilled for all other above noted stable conditions.  Labs ordered as noted above.             BMI  Estimated body mass index is 31.81 kg/m  as calculated from the following:    Height as of this encounter: 1.685 m (5' 6.34\").    Weight as of this encounter: 90.3 kg (199 lb 2 oz).   Weight management plan: Discussed healthy diet and exercise guidelines    Counseling  Appropriate preventive services were addressed with this patient via screening, questionnaire, or discussion as appropriate for fall prevention, nutrition, physical activity, Tobacco-use cessation, weight loss and cognition.  Checklist reviewing preventive services available has been given to the patient.  Reviewed patient's diet, addressing concerns and/or questions.   He is at risk for lack of exercise and has been provided with information to increase physical activity for the benefit of his well-being.           Randi Roberson is a 59 year old, presenting for the following:  Physical        8/8/2024     1:50 PM   Additional Questions   Roomed by Sonja COLLINS MA        Health Care Directive  Patient does not have a Health Care Directive or Living Will: Discussed advance " care planning with patient; information given to patient to review.    HPI              8/7/2024   General Health   How would you rate your overall physical health? Good   Feel stress (tense, anxious, or unable to sleep) Not at all            8/7/2024   Nutrition   Three or more servings of calcium each day? (!) NO   Diet: Low salt    Breakfast skipped   How many servings of fruit and vegetables per day? (!) 0-1   How many sweetened beverages each day? 0-1       Multiple values from one day are sorted in reverse-chronological order         8/7/2024   Exercise   Days per week of moderate/strenous exercise 1 day   Average minutes spent exercising at this level 20 min      (!) EXERCISE CONCERN      8/7/2024   Social Factors   Frequency of gathering with friends or relatives Once a week   Worry food won't last until get money to buy more No   Food not last or not have enough money for food? No   Do you have housing? (Housing is defined as stable permanent housing and does not include staying ouside in a car, in a tent, in an abandoned building, in an overnight shelter, or couch-surfing.) Yes   Are you worried about losing your housing? No   Lack of transportation? No   Unable to get utilities (heat,electricity)? No            8/7/2024   Fall Risk   Fallen 2 or more times in the past year? No   Trouble with walking or balance? No             8/7/2024   Dental   Dentist two times every year? Yes            8/7/2024   TB Screening   Were you born outside of the [place]? No              Today's PHQ-2 Score:       2/7/2024     2:42 PM   PHQ-2 ( 1999 [employer])   Q1: Little interest or pleasure in doing things 0   Q2: Feeling down, depressed or hopeless 0   PHQ-2 Score 0   Q1: Little interest or pleasure in doing things Not at all   Q2: Feeling down, depressed or hopeless Not at all   PHQ-2 Score 0         8/7/2024   Substance Use   Alcohol more than 3/day or more than 7/wk No   Do you use any other substances recreationally? No     "    Social History     Tobacco Use    Smoking status: Never    Smokeless tobacco: Never   Vaping Use    Vaping status: Never Used   Substance Use Topics    Alcohol use: Yes    Drug use: No           8/7/2024   STI Screening   New sexual partner(s) since last STI/HIV test? No      Last PSA:   PSA   Date Value Ref Range Status   10/11/2017 2.68 0 - 4 ug/L Final     Comment:     Assay Method:  Chemiluminescence using Siemens Vista analyzer     ASCVD Risk   The 10-year ASCVD risk score (Madina IRBY, et al., 2019) is: 16.9%    Values used to calculate the score:      Age: 59 years      Sex: Male      Is Non- : No      Diabetic: Yes      Tobacco smoker: No      Systolic Blood Pressure: 112 mmHg      Is BP treated: Yes      HDL Cholesterol: 39 mg/dL      Total Cholesterol: 240 mg/dL           Reviewed and updated as needed this visit by Provider   Tobacco  Allergies  Meds  Problems  Med Hx  Surg Hx  Fam Hx                     Objective    Exam  /70   Pulse 68   Temp 97.3  F (36.3  C) (Temporal)   Resp 16   Ht 1.685 m (5' 6.34\")   Wt 90.3 kg (199 lb 2 oz)   SpO2 98%   BMI 31.81 kg/m     Estimated body mass index is 31.81 kg/m  as calculated from the following:    Height as of this encounter: 1.685 m (5' 6.34\").    Weight as of this encounter: 90.3 kg (199 lb 2 oz).    Physical Exam  Constitutional:       General: He is not in acute distress.     Appearance: He is well-developed.   HENT:      Head: Normocephalic and atraumatic.      Right Ear: Hearing, tympanic membrane, ear canal and external ear normal.      Left Ear: Hearing, tympanic membrane, ear canal and external ear normal.      Nose: Nose normal.      Mouth/Throat:      Mouth: No oral lesions.      Pharynx: Uvula midline. No oropharyngeal exudate.   Eyes:      General: Lids are normal. No scleral icterus.        Right eye: No discharge.         Left eye: No discharge.      Extraocular Movements: Extraocular " movements intact.      Conjunctiva/sclera: Conjunctivae normal.      Pupils: Pupils are equal, round, and reactive to light.   Neck:      Thyroid: No thyroid mass or thyromegaly.      Trachea: No tracheal deviation.   Cardiovascular:      Rate and Rhythm: Normal rate and regular rhythm.      Pulses: Normal pulses.      Heart sounds: Normal heart sounds, S1 normal and S2 normal. No murmur heard.     No S3 or S4 sounds.   Pulmonary:      Effort: Pulmonary effort is normal. No respiratory distress.      Breath sounds: Normal breath sounds. No wheezing or rales.   Abdominal:      General: Bowel sounds are normal. There is no distension.      Palpations: Abdomen is soft. There is no mass.      Tenderness: There is no abdominal tenderness. There is no guarding.   Musculoskeletal:         General: No deformity. Normal range of motion.      Cervical back: Normal range of motion and neck supple.   Lymphadenopathy:      Cervical: No cervical adenopathy.      Upper Body:      Right upper body: No supraclavicular adenopathy.      Left upper body: No supraclavicular adenopathy.   Skin:     General: Skin is warm and dry.      Findings: No lesion or rash.   Neurological:      Mental Status: He is alert and oriented to person, place, and time.      Motor: No abnormal muscle tone.      Deep Tendon Reflexes: Reflexes are normal and symmetric.   Psychiatric:         Speech: Speech normal.         Thought Content: Thought content normal.         Judgment: Judgment normal.               Signed Electronically by: Minh Benavides MD

## 2024-08-12 ASSESSMENT — HOOS JR
BENDING TO THE FLOOR TO PICK UP OBJECT: MODERATE
HOOS JR TOTAL INTERVAL SCORE: 61.82
RISING FROM SITTING: MODERATE
GOING UP OR DOWN STAIRS: MILD
SITTING: MODERATE
LYING IN BED (TURNING OVER, MAINTAINING HIP POSITION): MODERATE

## 2024-08-13 ENCOUNTER — TELEPHONE (OUTPATIENT)
Dept: ORTHOPEDICS | Facility: CLINIC | Age: 59
End: 2024-08-13

## 2024-08-13 ENCOUNTER — ANCILLARY PROCEDURE (OUTPATIENT)
Dept: GENERAL RADIOLOGY | Facility: CLINIC | Age: 59
End: 2024-08-13
Attending: ORTHOPAEDIC SURGERY
Payer: COMMERCIAL

## 2024-08-13 ENCOUNTER — OFFICE VISIT (OUTPATIENT)
Dept: ORTHOPEDICS | Facility: CLINIC | Age: 59
End: 2024-08-13
Payer: COMMERCIAL

## 2024-08-13 VITALS
BODY MASS INDEX: 31.98 KG/M2 | HEIGHT: 66 IN | WEIGHT: 199 LBS | DIASTOLIC BLOOD PRESSURE: 81 MMHG | SYSTOLIC BLOOD PRESSURE: 126 MMHG | TEMPERATURE: 97.1 F

## 2024-08-13 DIAGNOSIS — M16.11 PRIMARY OSTEOARTHRITIS OF RIGHT HIP: Primary | ICD-10-CM

## 2024-08-13 DIAGNOSIS — M16.11 PRIMARY OSTEOARTHRITIS OF RIGHT HIP: ICD-10-CM

## 2024-08-13 PROCEDURE — 99214 OFFICE O/P EST MOD 30 MIN: CPT | Performed by: ORTHOPAEDIC SURGERY

## 2024-08-13 PROCEDURE — 73502 X-RAY EXAM HIP UNI 2-3 VIEWS: CPT | Mod: TC | Performed by: STUDENT IN AN ORGANIZED HEALTH CARE EDUCATION/TRAINING PROGRAM

## 2024-08-13 NOTE — PATIENT INSTRUCTIONS
Mr. Akins and I talked about his condition and diagnosis.  Because of severe arthritis, and failure of conservative measures, we have decided to proceed with  total hip arthroplasty.  This will be done with a direct anterior approach.    We have talked in depth about the procedure and the risks, which include, but are not limited to:  * blood loss possibly requiring transfusion,   * blood clots with possible pulmonary embolism  * risk of dislocation.  There will be no restrictions on bending after surgery.  * infection or wound healing problems  * leg length inequality  * nerve damage.  * vascular circulation problems  * continued pain  * Loosening or wear  * anesthetic risks  * The possibility of needing additional procedures.      We also talked about recovery time, which differs from patient to patient.    We can plan same day surgery.  Average 1 night in the hospital.  Most people can return home at that point.  Usually Physical Therapy is not required after the hospital.   You will use a walker or cane for comfort postoperative.  You can stop these when you are comfortable.    Preop xrays have been ordered, and medical clearance will need to be obtained.    Preop physical with primary physician is needed within 30 days of the surgery.  Nothing to eat or drink for 8 hours before surgery.  Stop blood thinners 5 days before surgery (aspirin, warfarin, anti-inflammatories).      Surgery schedulers will call you to schedule surgery.     If you don't get a call in the next few days, then call [phone] to schedule for [place].

## 2024-08-13 NOTE — LETTER
8/13/2024      Da Akins  [address] [address] [place] [id number]      Dear Colleague,    Thank you for referring your patient, Da Akins, to the [facility]. Please see a copy of my visit note below.    Da Akins is a 59 year old male who is seen in follow up for bilateral hip pains.  He has had this for approximately 6 years.  It hurts if he has been sitting too long and then gets up.  Walking hurts.  He describes occasional shooting pains rated 5 out of 10.  He has had steroid injections.  These gave fair relief initially, but most recent on 6/12/24 only lasted one month .  He has used anti-inflammatories, but had creatinine elevation on this and it was stopped..  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  Having significant difficulty with work.  He is , so needs to work.      X-ray today shows significant osteoarthritis of right hip with femoral acetabular impingement and narrowing of joint space.  Not quite bone-on-bone, but femoral head is no longer round.  Leg length is equal.    Past Medical History:   Diagnosis Date     Arthritis 2017    Arthritis in right hip     Healthy adult      Hypertension 2005    Take prescription     Seasonal allergies      Type 2 diabetes mellitus with hyperglycemia, without long-term current use of insulin (H) 7/29/2023       Past Surgical History:   Procedure Laterality Date     ANKLE SURGERY       COLONOSCOPY N/A 1/23/2023    Procedure: COLONOSCOPY;  Surgeon: Jose Montanez MD;  Location: [facility]     gnaglion cyst remove Right        Family History   Problem Relation Age of Onset     Colon Cancer Mother 72     Diabetes Father      Hypertension Father      Cerebrovascular Disease Father      Colon Cancer Father 50     Cerebrovascular Disease Maternal Grandfather      Coronary Artery Disease No family hx of      Hyperlipidemia No family hx of      Prostate Cancer No family hx of         Social History     Socioeconomic History     Marital status: Single     Spouse name: Not on file     Number of children: 3     Years of education: Not on file     Highest education level: Not on file   Occupational History     Not on file   Tobacco Use     Smoking status: Never     Smokeless tobacco: Never   Vaping Use     Vaping status: Never Used   Substance and Sexual Activity     Alcohol use: Yes     Drug use: No     Sexual activity: Yes     Partners: Female     Birth control/protection: Male Surgical     Comment: 3 children.   Other Topics Concern     Parent/sibling w/ CABG, MI or angioplasty before 65F 55M? No   Social History Narrative     Not on file     Social Determinants of Health     Financial Resource Strain: Low Risk  (8/7/2024)    Financial Resource Strain      Within the past 12 months, have you or your family members you live with been unable to get utilities (heat, electricity) when it was really needed?: No   Food Insecurity: Low Risk  (8/7/2024)    Food Insecurity      Within the past 12 months, did you worry that your food would run out before you got money to buy more?: No      Within the past 12 months, did the food you bought just not last and you didn t have money to get more?: No   Transportation Needs: Low Risk  (8/7/2024)    Transportation Needs      Within the past 12 months, has lack of transportation kept you from medical appointments, getting your medicines, non-medical meetings or appointments, work, or from getting things that you need?: No   Physical Activity: Insufficiently Active (8/7/2024)    Exercise Vital Sign      Days of Exercise per Week: 1 day      Minutes of Exercise per Session: 20 min   Stress: No Stress Concern Present (8/7/2024)    [employer] - Occupational Stress Questionnaire      Feeling of Stress : Not at all   Social Connections: Unknown (8/7/2024)    Social Connection and Isolation Panel [NHANES]      Frequency of  Communication with Friends and Family: Not on file      Frequency of Social Gatherings with Friends and Family: Once a week      Attends Caodaism Services: Not on file      Active Member of Clubs or Organizations: Not on file      Attends Club or Organization Meetings: Not on file      Marital Status: Not on file   Interpersonal Safety: Low Risk  (9/29/2023)    Interpersonal Safety      Do you feel physically and emotionally safe where you currently live?: Yes      Within the past 12 months, have you been hit, slapped, kicked or otherwise physically hurt by someone?: No      Within the past 12 months, have you been humiliated or emotionally abused in other ways by your partner or ex-partner?: No   Housing Stability: Low Risk  (8/7/2024)    Housing Stability      Do you have housing? : Yes      Are you worried about losing your housing?: No       Current Outpatient Medications   Medication Sig Dispense Refill     aspirin 81 MG EC tablet Take 1 tablet (81 mg) by mouth daily       atorvastatin (LIPITOR) 40 MG tablet Take 1 tablet (40 mg) by mouth daily 90 tablet 4     blood glucose (NO BRAND SPECIFIED) lancets standard Use to test blood sugar once times daily or as directed. 100 each 11     blood glucose (NO BRAND SPECIFIED) test strip Use to test blood sugar 1 times daily or as directed. 100 strip 11     blood glucose monitoring (NO BRAND SPECIFIED) meter device kit Use to test blood sugar 1 times daily or as directed. 1 kit 0     carvedilol (COREG) 6.25 MG tablet Take 1 tablet (6.25 mg) by mouth 2 times daily (with meals) 180 tablet 4     chlorthalidone (HYGROTON) 25 MG tablet Take 1 tablet (25 mg) by mouth daily 90 tablet 4     ipratropium (ATROVENT) 0.03 % nasal spray Spray 2 sprays into both nostrils every 12 hours 30 mL 1     ketoconazole (NIZORAL) 2 % external cream Apply topically daily 60 g 3     losartan (COZAAR) 100 MG tablet Take 1 tablet (100 mg) by mouth daily 90 tablet 4     metFORMIN (GLUCOPHAGE XR)  "500 MG 24 hr tablet Take 2 tablets (1,000 mg) by mouth 2 times daily (with meals) 360 tablet 1     triamcinolone (KENALOG) 0.5 % external ointment          Allergies   Allergen Reactions     Seasonal Allergies        REVIEW OF SYSTEMS:  CONSTITUTIONAL:  NEGATIVE for fever, chills, change in weight, not feeling tired  SKIN:  NEGATIVE for worrisome rashes, no skin lumps, no skin ulcers and no non-healing wounds  EYES:  NEGATIVE for vision changes or irritation.  ENT/MOUTH:  NEGATIVE.  No hearing loss, no hoarseness, no difficulty swallowing.  RESP:  NEGATIVE. No cough or shortness of breath.  CV:  NEGATIVE for chest pain, palpitations or peripheral edema  GI:  NEGATIVE for nausea, abdominal pain, heartburn, or change in bowel habits  :  Negative. No dysuria, no hematuria  MUSCULOSKELETAL:  See HPI above  NEURO:  NEGATIVE . No headaches, no dizziness,  no numbness  ENDOCRINE:  NEGATIVE for temperature intolerance, skin/hair changes  HEME/ALLERGY/IMMUNE:  NEGATIVE for bleeding problems  PSYCHIATRIC:  NEGATIVE. no anxiety, no depression.     Exam:  Vitals: /81 (BP Location: Right arm, Patient Position: Sitting, Cuff Size: Adult Large)   Temp 97.1  F (36.2  C) (Temporal)   Ht 1.676 m (5' 6\")   Wt 90.3 kg (199 lb)   BMI 32.12 kg/m    BMI= Body mass index is 32.12 kg/m .  Constitutional:  healthy, alert and no distress  Neuro: Alert and Oriented x 3, no focal defects.  Psych: Affect normal   Respiratory: Breathing not labored.  Cardiovascular: normal peripheral pulses  Lymph: no adenopathy  Skin: No rashes,worrisome lesions or skin problems  He has no pain across the low back.  Right hip shows 20 degrees external rotation and 5 degrees internal rotation.  Left hip shows 45 degrees external rotation and 15 degrees internal rotation.  He has pain mainly on the right with the internal rotation.  He has no tenderness over the greater trochanters.  Sensation, motor and circulation are intact.    Assessment:  right " hip osteoarthritis and femoral acetabular impingement.  Severe osteoarthritis by symptoms.  Interfering with his work as .    Plan: We talked about the patient's condition and diagnosis.  Because of severe arthritis, and failure of conservative measures, I have suggested right total hip arthroplasty.  I've talked in depth about the procedure and the risks, which include, but are not limited to blood loss requiring transfusion, infection, neurovascular injury, deep vein thrombosis, pulmonary embolis, continued pain, numbness around the wound, dislocation, loosening, wear, leg length discrepancy, anesthetic risks, and the possibility of needing additional procedures.  We also talked about recovery time, which differs from patient to patient.  Preop xrays were ordered, and medical clearance will need to be obtained.    Given the patient's severity of symptoms and advanced degenerative changes on xray physical therapy would be more likely to cause an exacerbation of symptoms and therefor worsen quality of life. I feel physical therapy is contraindicated at this time.    Special considerations: direct anterior approach.  Leg length is equal.  He wants to do [facility] as same day surgery if possible.            Again, thank you for allowing me to participate in the care of your patient.        Sincerely,        Ronald Vieyra MD

## 2024-08-13 NOTE — PROGRESS NOTES
Da Akins is a 59 year old male who is seen in follow up for bilateral hip pains.  He has had this for approximately 6 years.  It hurts if he has been sitting too long and then gets up.  Walking hurts.  He describes occasional shooting pains rated 5 out of 10.  He has had steroid injections.  These gave fair relief initially, but most recent on 6/12/24 only lasted one month .  He has used anti-inflammatories, but had creatinine elevation on this and it was stopped..  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  Having significant difficulty with work.  He is , so needs to work.      X-ray today shows significant osteoarthritis of right hip with femoral acetabular impingement and narrowing of joint space.  Not quite bone-on-bone, but femoral head is no longer round.  Leg length is equal.    Past Medical History:   Diagnosis Date    Arthritis 2017    Arthritis in right hip    Healthy adult     Hypertension 2005    Take prescription    Seasonal allergies     Type 2 diabetes mellitus with hyperglycemia, without long-term current use of insulin (H) 7/29/2023       Past Surgical History:   Procedure Laterality Date    ANKLE SURGERY      COLONOSCOPY N/A 1/23/2023    Procedure: COLONOSCOPY;  Surgeon: Jose Montanez MD;  Location: [facility]    gnaglion cyst remove Right        Family History   Problem Relation Age of Onset    Colon Cancer Mother 72    Diabetes Father     Hypertension Father     Cerebrovascular Disease Father     Colon Cancer Father 50    Cerebrovascular Disease Maternal Grandfather     Coronary Artery Disease No family hx of     Hyperlipidemia No family hx of     Prostate Cancer No family hx of        Social History     Socioeconomic History    Marital status: Single     Spouse name: Not on file    Number of children: 3    Years of education: Not on file    Highest education level: Not on file   Occupational History    Not on file   Tobacco Use     Smoking status: Never    Smokeless tobacco: Never   Vaping Use    Vaping status: Never Used   Substance and Sexual Activity    Alcohol use: Yes    Drug use: No    Sexual activity: Yes     Partners: Female     Birth control/protection: Male Surgical     Comment: 3 children.   Other Topics Concern    Parent/sibling w/ CABG, MI or angioplasty before 65F 55M? No   Social History Narrative    Not on file     Social Determinants of Health     Financial Resource Strain: Low Risk  (8/7/2024)    Financial Resource Strain     Within the past 12 months, have you or your family members you live with been unable to get utilities (heat, electricity) when it was really needed?: No   Food Insecurity: Low Risk  (8/7/2024)    Food Insecurity     Within the past 12 months, did you worry that your food would run out before you got money to buy more?: No     Within the past 12 months, did the food you bought just not last and you didn t have money to get more?: No   Transportation Needs: Low Risk  (8/7/2024)    Transportation Needs     Within the past 12 months, has lack of transportation kept you from medical appointments, getting your medicines, non-medical meetings or appointments, work, or from getting things that you need?: No   Physical Activity: Insufficiently Active (8/7/2024)    Exercise Vital Sign     Days of Exercise per Week: 1 day     Minutes of Exercise per Session: 20 min   Stress: No Stress Concern Present (8/7/2024)    [employer] [employer] - Occupational Stress Questionnaire     Feeling of Stress : Not at all   Social Connections: Unknown (8/7/2024)    Social Connection and Isolation Panel [NHANES]     Frequency of Communication with Friends and Family: Not on file     Frequency of Social Gatherings with Friends and Family: Once a week     Attends Jew Services: Not on file     Active Member of Clubs or Organizations: Not on file     Attends Club or Organization Meetings: Not on file     Marital  Status: Not on file   Interpersonal Safety: Low Risk  (9/29/2023)    Interpersonal Safety     Do you feel physically and emotionally safe where you currently live?: Yes     Within the past 12 months, have you been hit, slapped, kicked or otherwise physically hurt by someone?: No     Within the past 12 months, have you been humiliated or emotionally abused in other ways by your partner or ex-partner?: No   Housing Stability: Low Risk  (8/7/2024)    Housing Stability     Do you have housing? : Yes     Are you worried about losing your housing?: No       Current Outpatient Medications   Medication Sig Dispense Refill    aspirin 81 MG EC tablet Take 1 tablet (81 mg) by mouth daily      atorvastatin (LIPITOR) 40 MG tablet Take 1 tablet (40 mg) by mouth daily 90 tablet 4    blood glucose (NO BRAND SPECIFIED) lancets standard Use to test blood sugar once times daily or as directed. 100 each 11    blood glucose (NO BRAND SPECIFIED) test strip Use to test blood sugar 1 times daily or as directed. 100 strip 11    blood glucose monitoring (NO BRAND SPECIFIED) meter device kit Use to test blood sugar 1 times daily or as directed. 1 kit 0    carvedilol (COREG) 6.25 MG tablet Take 1 tablet (6.25 mg) by mouth 2 times daily (with meals) 180 tablet 4    chlorthalidone (HYGROTON) 25 MG tablet Take 1 tablet (25 mg) by mouth daily 90 tablet 4    ipratropium (ATROVENT) 0.03 % nasal spray Spray 2 sprays into both nostrils every 12 hours 30 mL 1    ketoconazole (NIZORAL) 2 % external cream Apply topically daily 60 g 3    losartan (COZAAR) 100 MG tablet Take 1 tablet (100 mg) by mouth daily 90 tablet 4    metFORMIN (GLUCOPHAGE XR) 500 MG 24 hr tablet Take 2 tablets (1,000 mg) by mouth 2 times daily (with meals) 360 tablet 1    triamcinolone (KENALOG) 0.5 % external ointment          Allergies   Allergen Reactions    Seasonal Allergies        REVIEW OF SYSTEMS:  CONSTITUTIONAL:  NEGATIVE for fever, chills, change in weight, not feeling  "tired  SKIN:  NEGATIVE for worrisome rashes, no skin lumps, no skin ulcers and no non-healing wounds  EYES:  NEGATIVE for vision changes or irritation.  ENT/MOUTH:  NEGATIVE.  No hearing loss, no hoarseness, no difficulty swallowing.  RESP:  NEGATIVE. No cough or shortness of breath.  CV:  NEGATIVE for chest pain, palpitations or peripheral edema  GI:  NEGATIVE for nausea, abdominal pain, heartburn, or change in bowel habits  :  Negative. No dysuria, no hematuria  MUSCULOSKELETAL:  See HPI above  NEURO:  NEGATIVE . No headaches, no dizziness,  no numbness  ENDOCRINE:  NEGATIVE for temperature intolerance, skin/hair changes  HEME/ALLERGY/IMMUNE:  NEGATIVE for bleeding problems  PSYCHIATRIC:  NEGATIVE. no anxiety, no depression.     Exam:  Vitals: /81 (BP Location: Right arm, Patient Position: Sitting, Cuff Size: Adult Large)   Temp 97.1  F (36.2  C) (Temporal)   Ht 1.676 m (5' 6\")   Wt 90.3 kg (199 lb)   BMI 32.12 kg/m    BMI= Body mass index is 32.12 kg/m .  Constitutional:  healthy, alert and no distress  Neuro: Alert and Oriented x 3, no focal defects.  Psych: Affect normal   Respiratory: Breathing not labored.  Cardiovascular: normal peripheral pulses  Lymph: no adenopathy  Skin: No rashes,worrisome lesions or skin problems  He has no pain across the low back.  Right hip shows 20 degrees external rotation and 5 degrees internal rotation.  Left hip shows 45 degrees external rotation and 15 degrees internal rotation.  He has pain mainly on the right with the internal rotation.  He has no tenderness over the greater trochanters.  Sensation, motor and circulation are intact.    Assessment:  right hip osteoarthritis and femoral acetabular impingement.  Severe osteoarthritis by symptoms.  Interfering with his work as .    Plan: We talked about the patient's condition and diagnosis.  Because of severe arthritis, and failure of conservative measures, I have suggested right total hip arthroplasty.  " I've talked in depth about the procedure and the risks, which include, but are not limited to blood loss requiring transfusion, infection, neurovascular injury, deep vein thrombosis, pulmonary embolis, continued pain, numbness around the wound, dislocation, loosening, wear, leg length discrepancy, anesthetic risks, and the possibility of needing additional procedures.  We also talked about recovery time, which differs from patient to patient.  Preop xrays were ordered, and medical clearance will need to be obtained.    Given the patient's severity of symptoms and advanced degenerative changes on xray physical therapy would be more likely to cause an exacerbation of symptoms and therefor worsen quality of life. I feel physical therapy is contraindicated at this time.    Special considerations: direct anterior approach.  Leg length is equal.  He wants to do [facility] as same day surgery if possible.

## 2024-08-13 NOTE — TELEPHONE ENCOUNTER
Type of surgery: ARTHROPLASTY, HIP, TOTAL, DIRECT ANTERIOR APPROACH (Right)   Location of surgery: [facility]  Date and time of surgery: 11/13  Surgeon: Jarrell  Pre-Op Appt Date: 10/28  Post-Op Appt Date: 11/26   Packet sent out: Yes  Pre-cert/Authorization completed:  Not Applicable  Date: na

## 2024-09-09 ENCOUNTER — TRANSFERRED RECORDS (OUTPATIENT)
Dept: MULTI SPECIALTY CLINIC | Facility: CLINIC | Age: 59
End: 2024-09-09

## 2024-09-09 LAB — RETINOPATHY: NORMAL

## 2024-11-01 ENCOUNTER — OFFICE VISIT (OUTPATIENT)
Dept: FAMILY MEDICINE | Facility: CLINIC | Age: 59
End: 2024-11-01
Payer: COMMERCIAL

## 2024-11-01 VITALS
SYSTOLIC BLOOD PRESSURE: 128 MMHG | BODY MASS INDEX: 32.95 KG/M2 | HEART RATE: 74 BPM | TEMPERATURE: 97.3 F | WEIGHT: 205 LBS | HEIGHT: 66 IN | DIASTOLIC BLOOD PRESSURE: 76 MMHG | OXYGEN SATURATION: 97 %

## 2024-11-01 DIAGNOSIS — Z87.821 HISTORY OF RETAINED FOREIGN BODY FULLY REMOVED: ICD-10-CM

## 2024-11-01 DIAGNOSIS — M79.671 RIGHT FOOT PAIN: ICD-10-CM

## 2024-11-01 DIAGNOSIS — E11.9 TYPE 2 DIABETES MELLITUS WITHOUT COMPLICATION, WITHOUT LONG-TERM CURRENT USE OF INSULIN (H): Primary | ICD-10-CM

## 2024-11-01 PROCEDURE — 10120 INC&RMVL FB SUBQ TISS SMPL: CPT

## 2024-11-01 PROCEDURE — 99213 OFFICE O/P EST LOW 20 MIN: CPT | Mod: 25

## 2024-11-01 ASSESSMENT — PAIN SCALES - GENERAL: PAINLEVEL_OUTOF10: NO PAIN (0)

## 2024-11-01 NOTE — PATIENT INSTRUCTIONS
Assessment  - Persistent erythema and tenderness on the side of the foot likely due to a retained foreign body (thorn) since May.  - Concern for potential complications due to diabetes, necessitating careful management of foot injuries.    Plan  - Apply numbing medicine and clean the affected area.  - Scrape the area with a scalpel to remove any foreign material.  - Bandage the area after the procedure.  - Recommend warm water soaks to help with healing.  - Use a pumice stone to remove dead skin.    [place]    Patient understood and verbally consented to the treatment plan. Discussed symptoms that would warrant an urgent or emergent visit. All of the patients' questions were answered. Patient was instructed to contact the clinic if questions or concerns arise. Recommend follow up appointments if symptoms worsen or fail to improve. Recommend follow up as needed. Recommend ER in the case of an emergency.    Michelle Almazan PA-C    Please note: Voice recognition software may have been used in preparing this note, unintended word substitutions may be present.

## 2024-11-01 NOTE — PROGRESS NOTES
Assessment & Plan     Right foot pain    History of retained foreign body fully removed  - REMOVE FOREIGN BODY SIMPLE    Type 2 diabetes mellitus without complication, without long-term current use of insulin (H)        I spent a total of 20 minutes on the day of the visit.   Time spent by me doing chart review, history and exam, documentation and further activities per the note      See Patient Instructions  Patient Instructions     Assessment  - Persistent erythema and tenderness on the side of the foot likely due to a retained foreign body (thorn) since May.  - Concern for potential complications due to diabetes, necessitating careful management of foot injuries.    Plan  - Apply numbing medicine and clean the affected area.  - Scrape the area with a scalpel to remove any foreign material.  - Bandage the area after the procedure.  - Recommend warm water soaks to help with healing.  - Use a pumice stone to remove dead skin.    [place]    Patient understood and verbally consented to the treatment plan. Discussed symptoms that would warrant an urgent or emergent visit. All of the patients' questions were answered. Patient was instructed to contact the clinic if questions or concerns arise. Recommend follow up appointments if symptoms worsen or fail to improve. Recommend follow up as needed. Recommend ER in the case of an emergency.    Michelle Almazan PA-C    Please note: Voice recognition software may have been used in preparing this note, unintended word substitutions may be present.      Randi Roberson is a 59 year old, presenting for the following health issues:  Foot Problems        11/1/2024     8:38 AM   Additional Questions   Roomed by Yoli YEAGER     Via the Health Maintenance questionnaire, the patient has reported the following services have been completed -Eye Exam: [facility] 2024-09-09, this information has been sent to the abstraction team.  History of Present Illness       Reason for visit:   "Sore on side of foot that wont go away  Symptom onset:  More than a month  Symptoms include:  It started out as a grass spur in may and it has gone away and still hurts when touched  What makes it worse:  Touching it  What makes it better:  No   He is taking medications regularly.   Subjective  The patient reports having a thorn stuck in the side of his foot since stepping on it at his front door. He attempted to remove it but was unsuccessful. Since May, the area has remained red and tender. Recently, he noticed a chunk of skin on the site, which he peeled off, but he still feels something is there. The patient describes the area as pretty tender and is concerned about why it hasn't healed. He has diabetes and is aware of the need to be cautious with foot injuries. The patient mentions that the skin previously appeared like a ball and did not hurt when he tore it off. He has shown the issue to someone named Kingsley, who did not think much of it. The patient is seeking help to address the persistent redness and tenderness.    Objective  - Physical exam:  - Examination of the side of the foot  - Application of lidocaine for numbing  - Scraping of the area with a scalpel    Assessment  - Persistent erythema and tenderness on the side of the foot likely due to a retained foreign body (thorn) since May.  - Concern for potential complications due to diabetes, necessitating careful management of foot injuries.    Plan  - Apply numbing medicine and clean the affected area.  - Scrape the area with a scalpel to remove any foreign material.  - Bandage the area after the procedure.  - Recommend warm water soaks to help with healing.  - Use a pumice stone to remove dead skin.                  Objective    /76   Pulse 74   Temp 97.3  F (36.3  C) (Temporal)   Ht 1.664 m (5' 5.5\")   Wt 93 kg (205 lb)   SpO2 97%   BMI 33.59 kg/m    Body mass index is 33.59 kg/m .  Physical Exam               Signed Electronically by: " Michelle Almazan PA-C

## 2024-11-15 ENCOUNTER — OFFICE VISIT (OUTPATIENT)
Dept: FAMILY MEDICINE | Facility: CLINIC | Age: 59
End: 2024-11-15
Payer: COMMERCIAL

## 2024-11-15 VITALS
SYSTOLIC BLOOD PRESSURE: 126 MMHG | DIASTOLIC BLOOD PRESSURE: 76 MMHG | HEIGHT: 66 IN | OXYGEN SATURATION: 98 % | TEMPERATURE: 97.3 F | HEART RATE: 64 BPM | WEIGHT: 199 LBS | BODY MASS INDEX: 31.98 KG/M2

## 2024-11-15 DIAGNOSIS — E11.9 TYPE 2 DIABETES MELLITUS WITHOUT COMPLICATION, WITHOUT LONG-TERM CURRENT USE OF INSULIN (H): Primary | ICD-10-CM

## 2024-11-15 DIAGNOSIS — L03.115 CELLULITIS OF RIGHT LOWER EXTREMITY: ICD-10-CM

## 2024-11-15 PROCEDURE — 99213 OFFICE O/P EST LOW 20 MIN: CPT

## 2024-11-15 RX ORDER — CEPHALEXIN 500 MG/1
500 CAPSULE ORAL 2 TIMES DAILY
Qty: 14 CAPSULE | Refills: 0 | Status: SHIPPED | OUTPATIENT
Start: 2024-11-15 | End: 2024-11-22

## 2024-11-15 RX ORDER — MUPIROCIN 20 MG/G
OINTMENT TOPICAL 3 TIMES DAILY
Qty: 30 G | Refills: 2 | Status: SHIPPED | OUTPATIENT
Start: 2024-11-15

## 2024-11-15 ASSESSMENT — PAIN SCALES - GENERAL: PAINLEVEL_OUTOF10: NO PAIN (0)

## 2024-11-15 NOTE — PROGRESS NOTES
Assessment & Plan     Type 2 diabetes mellitus without complication, without long-term current use of insulin (H)  - Orthopedic  Referral; Future    Cellulitis of right lower extremity  - cephALEXin (KEFLEX) 500 MG capsule; Take 1 capsule (500 mg) by mouth 2 times daily for 7 days.  - mupirocin (BACTROBAN) 2 % external ointment; Apply topically 3 times daily.  - Orthopedic  Referral; Future        I spent a total of 10 minutes on the day of the visit.   Time spent by me today doing chart review, history and exam, documentation and further activities per the note      See Patient Instructions  Patient Instructions     Assessment  - Possible infection of the right foot, not blatantly infected but treated empirically due to diabetes and potential presence of pus.    Plan  - Treat with Keflex, 500 mg orally twice a day for seven days.  - Apply Mupirocin topical cream twice a day with a Band-Aid.  - Perform warm water soaks once or twice daily.  - Referral to podiatry for further evaluation.  - Provide instructions and contact information for the foot doctor.      Keflex for 7 days    Mupirocin twice a day    Follow up with podiatry    Warm water soaks    Kwendnote    Patient understood and verbally consented to the treatment plan. Discussed symptoms that would warrant an urgent or emergent visit. All of the patients' questions were answered. Patient was instructed to contact the clinic if questions or concerns arise. Recommend follow up appointments if symptoms worsen or fail to improve. Recommend follow up as needed. Recommend ER in the case of an emergency.    Michelle Almazan PA-C    Please note: Voice recognition software may have been used in preparing this note, unintended word substitutions may be present.          Randi Roberson is a 59 year old, presenting for the following health issues:  Foot Problems        11/15/2024     7:12 AM   Additional Questions   Roomed by Yoli Blackman  "of Present Illness       Reason for visit:  Had a sliver in foot removed and now still hurts i think its infected    He eats 0-1 servings of fruits and vegetables daily.He consumes 0 sweetened beverage(s) daily.He exercises with enough effort to increase his heart rate 20 to 29 minutes per day.  He exercises with enough effort to increase his heart rate 3 or less days per week.   He is taking medications regularly.     Subjective  The presents with concerns about a sore that might be infected. The sore has been present for less than a week, and initially, the area was fine after shaving. The recalls possibly having something stuck in the area. There is a little whiteness observed, which could indicate pus. The confirms being diabetic. The sore is sometimes painful. The does not mention any allergies to antibiotics.    Objective  - Physical exam:  - Observation of a sore on the right foot with a little whiteness, uncertain if pus or callus.    Assessment  - Possible infection of the right foot, not blatantly infected but treated empirically due to diabetes and potential presence of pus.    Plan  - Treat with Keflex, 500 mg orally twice a day for seven days.  - Apply Mupirocin topical cream twice a day with a Band-Aid.  - Perform warm water soaks once or twice daily.  - Referral to podiatry for further evaluation.  - Provide instructions and contact information for the foot doctor.                  Objective    /76   Pulse 64   Temp 97.3  F (36.3  C) (Temporal)   Ht 1.664 m (5' 5.5\")   Wt 90.3 kg (199 lb)   SpO2 98%   BMI 32.61 kg/m    Body mass index is 32.61 kg/m .  Physical Exam     Right foot: Lateral aspect of the forefoot with area of whiteness surrounded by thickened skin          Signed Electronically by: Michelle Almazan PA-C    "

## 2024-11-15 NOTE — PATIENT INSTRUCTIONS
Assessment  - Possible infection of the right foot, not blatantly infected but treated empirically due to diabetes and potential presence of pus.    Plan  - Treat with Keflex, 500 mg orally twice a day for seven days.  - Apply Mupirocin topical cream twice a day with a Band-Aid.  - Perform warm water soaks once or twice daily.  - Referral to podiatry for further evaluation.  - Provide instructions and contact information for the foot doctor.      Keflex for 7 days    Mupirocin twice a day    Follow up with podiatry    Warm water soaks    Kwendnote    Patient understood and verbally consented to the treatment plan. Discussed symptoms that would warrant an urgent or emergent visit. All of the patients' questions were answered. Patient was instructed to contact the clinic if questions or concerns arise. Recommend follow up appointments if symptoms worsen or fail to improve. Recommend follow up as needed. Recommend ER in the case of an emergency.    Michelle Almazan PA-C    Please note: Voice recognition software may have been used in preparing this note, unintended word substitutions may be present.

## 2024-11-20 ENCOUNTER — TELEPHONE (OUTPATIENT)
Dept: PODIATRY | Facility: CLINIC | Age: 59
End: 2024-11-20
Payer: COMMERCIAL

## 2024-11-20 NOTE — TELEPHONE ENCOUNTER
After reviewing the chart, I would recommend that the patient either go to the acute diagnostic services ([facility]) he or to the emergency department.  Based on worsening of symptoms after being on antibiotics and the fact that he is diabetic, I am concerned for worsening infection of his limb.    Will send to primary care RN to follow-up with patient and give him his options for treatment.    Sending to Ortho RN to let them know that we will address with patient.    Minh Benavides MD

## 2024-11-20 NOTE — TELEPHONE ENCOUNTER
Patient was seen by Michelle Almazan PA-C 11/1/24 and again on 11/15/24 for cellulitis of his right foot. At his most recent visit, he was prescribed a 7 day course of cephalexin and mupirocin ointment. A priority podiatry referral was placed at this visit as well. Patient was scheduled for 12/3/24 by central scheduling team. Called patient to offer appointment with Dr. Resendiz today in [place]. Patient declined, stating that this would not work with his schedule. Patient states that he feels the redness to his foot has increased since he was last seen even though he is taking his antibiotics as prescribed. He denies pain and fever at this time.     Dr. Resendiz does not have any availability this week and will be out of clinic next week. Talked with Dr. Galeana as he is the only orthopedist in this week, he advised that he is unable to see patient. Spoke with Sonja in family practice who will speak to patient's primary care provider to discuss working patient in.     Nya Phillip RN   [facility]

## 2024-11-20 NOTE — TELEPHONE ENCOUNTER
Spoke with patient, advised on message below. He is reluctant to go to the ER. Education given to patient on importance per message and recommendation below from MD.    Priscila Balderas, RN, BSN

## 2024-11-21 ENCOUNTER — OFFICE VISIT (OUTPATIENT)
Dept: PODIATRY | Facility: CLINIC | Age: 59
End: 2024-11-21
Payer: COMMERCIAL

## 2024-11-21 ENCOUNTER — ANCILLARY PROCEDURE (OUTPATIENT)
Dept: GENERAL RADIOLOGY | Facility: CLINIC | Age: 59
End: 2024-11-21
Attending: PODIATRIST
Payer: COMMERCIAL

## 2024-11-21 VITALS
WEIGHT: 199 LBS | HEIGHT: 66 IN | SYSTOLIC BLOOD PRESSURE: 114 MMHG | TEMPERATURE: 97.4 F | DIASTOLIC BLOOD PRESSURE: 70 MMHG | BODY MASS INDEX: 31.98 KG/M2

## 2024-11-21 DIAGNOSIS — L03.115 CELLULITIS OF RIGHT FOOT: Primary | ICD-10-CM

## 2024-11-21 DIAGNOSIS — L03.115 CELLULITIS OF RIGHT FOOT: ICD-10-CM

## 2024-11-21 ASSESSMENT — PAIN SCALES - GENERAL: PAINLEVEL_OUTOF10: NO PAIN (0)

## 2024-11-21 NOTE — PROGRESS NOTES
HPI:  Da Akins is a 59 year old male who is seen in consultation at the request of Minh Benavides MD    Pt presents for eval of:   (Onset, Location, L/R, Character, Treatments, Injury if yes)    XR Right foot 11/21/2024    DM type 2     Onset May 2024, lesion dorsal lateral aspect Right foot caused by a grass 'thorn'. Area is tender at times.  11/1/2024 ERIC [facility] - removed a foreign body 'thorn like object'.     11/15/24 started Keflex 500 mg, 1 capsule 2 times daily for 7 days along with applying bactroman 2% ointment 3 times daily.    Self employed .      Review of Systems:  Patient denies fever, chills, rash, wound, stiffness, limping, numbness, weakness, heart burn, blood in stool, chest pain with activity, calf pain when walking, shortness of breath with activity, chronic cough, easy bleeding/bruising, swelling of ankles, excessive thirst, fatigue, depression, anxiety.  Patient admits only to symptoms noted in history.     Patient Active Problem List   Diagnosis    Benign essential hypertension    Primary osteoarthritis of both hips    Femoral acetabular impingement    Mixed hyperlipidemia    Type 2 diabetes mellitus without complication, without long-term current use of insulin (H)     PAST MEDICAL HISTORY:   Past Medical History:   Diagnosis Date    Arthritis 2017    Arthritis in right hip    Healthy adult     Hypertension 2005    Take prescription    Seasonal allergies     Type 2 diabetes mellitus with hyperglycemia, without long-term current use of insulin (H) 7/29/2023     PAST SURGICAL HISTORY:   Past Surgical History:   Procedure Laterality Date    ANKLE SURGERY      COLONOSCOPY N/A 1/23/2023    Procedure: COLONOSCOPY;  Surgeon: Jose Montanez MD;  Location: [facility]    gnaglion cyst remove Right      MEDICATIONS:   Current Outpatient Medications:     aspirin 81 MG EC tablet, Take 1 tablet (81 mg) by mouth daily, Disp: , Rfl:     atorvastatin (LIPITOR) 40 MG tablet,  Take 1 tablet (40 mg) by mouth daily, Disp: 90 tablet, Rfl: 4    blood glucose (NO BRAND SPECIFIED) lancets standard, Use to test blood sugar once times daily or as directed., Disp: 100 each, Rfl: 11    blood glucose (NO BRAND SPECIFIED) test strip, Use to test blood sugar 1 times daily or as directed., Disp: 100 strip, Rfl: 11    blood glucose monitoring (NO BRAND SPECIFIED) meter device kit, Use to test blood sugar 1 times daily or as directed., Disp: 1 kit, Rfl: 0    carvedilol (COREG) 6.25 MG tablet, Take 1 tablet (6.25 mg) by mouth 2 times daily (with meals), Disp: 180 tablet, Rfl: 4    cephALEXin (KEFLEX) 500 MG capsule, Take 1 capsule (500 mg) by mouth 2 times daily for 7 days., Disp: 14 capsule, Rfl: 0    chlorthalidone (HYGROTON) 25 MG tablet, Take 1 tablet (25 mg) by mouth daily, Disp: 90 tablet, Rfl: 4    losartan (COZAAR) 100 MG tablet, Take 1 tablet (100 mg) by mouth daily, Disp: 90 tablet, Rfl: 4    metFORMIN (GLUCOPHAGE XR) 500 MG 24 hr tablet, Take 2 tablets (1,000 mg) by mouth 2 times daily (with meals), Disp: 360 tablet, Rfl: 1    mupirocin (BACTROBAN) 2 % external ointment, Apply topically 3 times daily., Disp: 30 g, Rfl: 2    ipratropium (ATROVENT) 0.03 % nasal spray, Spray 2 sprays into both nostrils every 12 hours (Patient not taking: Reported on 11/21/2024), Disp: 30 mL, Rfl: 1    ketoconazole (NIZORAL) 2 % external cream, Apply topically daily (Patient not taking: Reported on 11/21/2024), Disp: 60 g, Rfl: 3    triamcinolone (KENALOG) 0.5 % external ointment, , Disp: , Rfl:   ALLERGIES:    Allergies   Allergen Reactions    Seasonal Allergies      SOCIAL HISTORY:   Social History     Socioeconomic History    Marital status: Single     Spouse name: Not on file    Number of children: 3    Years of education: Not on file    Highest education level: Not on file   Occupational History    Not on file   Tobacco Use    Smoking status: Never    Smokeless tobacco: Never   Vaping Use    Vaping status:  Never Used   Substance and Sexual Activity    Alcohol use: Yes    Drug use: No    Sexual activity: Yes     Partners: Female     Birth control/protection: Male Surgical     Comment: 3 children.   Other Topics Concern    Parent/sibling w/ CABG, MI or angioplasty before 65F 55M? No   Social History Narrative    Not on file     Social Drivers of Health     Financial Resource Strain: Low Risk  (8/7/2024)    Financial Resource Strain     Within the past 12 months, have you or your family members you live with been unable to get utilities (heat, electricity) when it was really needed?: No   Food Insecurity: Low Risk  (8/7/2024)    Food Insecurity     Within the past 12 months, did you worry that your food would run out before you got money to buy more?: No     Within the past 12 months, did the food you bought just not last and you didn t have money to get more?: No   Transportation Needs: Low Risk  (8/7/2024)    Transportation Needs     Within the past 12 months, has lack of transportation kept you from medical appointments, getting your medicines, non-medical meetings or appointments, work, or from getting things that you need?: No   Physical Activity: Insufficiently Active (8/7/2024)    Exercise Vital Sign     Days of Exercise per Week: 1 day     Minutes of Exercise per Session: 20 min   Stress: No Stress Concern Present (8/7/2024)    [employer] - Occupational Stress Questionnaire     Feeling of Stress : Not at all   Social Connections: Unknown (8/7/2024)    Social Connection and Isolation Panel [NHANES]     Frequency of Communication with Friends and Family: Not on file     Frequency of Social Gatherings with Friends and Family: Once a week     Attends Mandaeism Services: Not on file     Active Member of Clubs or Organizations: Not on file     Attends Club or Organization Meetings: Not on file     Marital Status: Not on file   Interpersonal Safety: Low Risk  (11/1/2024)    Interpersonal  "Safety     Do you feel physically and emotionally safe where you currently live?: Yes     Within the past 12 months, have you been hit, slapped, kicked or otherwise physically hurt by someone?: No     Within the past 12 months, have you been humiliated or emotionally abused in other ways by your partner or ex-partner?: No   Housing Stability: Low Risk  (8/7/2024)    Housing Stability     Do you have housing? : Yes     Are you worried about losing your housing?: No     FAMILY HISTORY:   Family History   Problem Relation Age of Onset    Colon Cancer Mother 72    Diabetes Father     Hypertension Father     Cerebrovascular Disease Father     Colon Cancer Father 50    Cerebrovascular Disease Maternal Grandfather     Coronary Artery Disease No family hx of     Hyperlipidemia No family hx of     Prostate Cancer No family hx of      EXAM:Vitals: /70 (BP Location: Left arm, Patient Position: Sitting, Cuff Size: Adult Large)   Temp 97.4  F (36.3  C) (Temporal)   Ht 1.664 m (5' 5.5\")   Wt 90.3 kg (199 lb)   BMI 32.61 kg/m    BMI= Body mass index is 32.61 kg/m .    General appearance: Patient is alert and fully cooperative with history & exam.  No sign of distress is noted during the visit.    Psychiatric: Affect is pleasant & appropriate.  Patient appears motivated to improve health.    Respiratory: Breathing is regular & unlabored while sitting.    HEENT: Hearing is intact to spoken word.  Speech is clear.  No gross evidence of visual impairment that would impact ambulation.    Vascular: DP ***/4 & PT ***/4 left & right.  CFT delayed with dependent rubor noted about the digits.  Diminished hair growth distal to mid tibia and no hair about the foot and toes.  Temperature changes noted, warm to cool proximal to distal.  Hemosiderin pigmentation noted with multiple varicosities legs and feet bilateral. Generalized edema bilateral legs and feet.  Pt denies claudication history.     Neurologic: Normal plantar response " bilateral.  Intact *** Loss of protective threshold plus 10/10 applications of a 5.07 monofilament.  Pt admits *** no burning and paraesthesias about the feet and toes with palpation.    Dermatologic: All 10 nails are thickened, elongated, discolored and painful with palpation and debridement.  Diminished texture turgor and tone about the integument.  Skin is thin & shiny.  No *** Pre ulcerative hyperkeratosis noted***.  No abscess or full thickness ulcerations noted.    Musculoskeletal: Patient is ambulatory without assistive device or brace.  There is semi reducible contracture of the lesser digits.    Hemoglobin A1C (%)   Date Value   08/08/2024 7.9 (H)   02/08/2024 7.5 (H)   10/23/2023 8.6 (H)   07/20/2023 8.5 (H)   06/09/2022 6.4 (H)   11/08/2018 6.2 (H)     Creatinine (mg/dL)   Date Value   02/08/2024 1.14   11/21/2023 1.56 (H)   10/23/2023 1.69 (H)   09/29/2023 1.82 (H)   07/27/2023 1.10   07/23/2023 0.95   01/19/2021 0.89   03/24/2020 0.86   08/09/2019 0.86   10/11/2017 0.96   04/15/2016 0.99       ASSESSMENT:     ICD-10-CM    1. Cellulitis of right foot  L03.115 XR Foot Right G/E 3 Views        PLAN:    11/21/2024  All 10 nails were debrided with a nail nipper.   I sharply debrided *** pre ulcerative hyperkeratotic lesions to the level of the dermis as noted above with a 15 blade.    We discussed risk factors and preventive measures.    We discussed appropriate hygiene, shoe gear, daily foot exam, and reinforced management of weight, diet, activity goals and HA1C goal for diabetic patients.    Dispensed written foot care instructions.    All questions were answered to their satisfaction.    RTC *** as needed with questions or concerns.    Stanton Resendiz DPM

## 2024-11-21 NOTE — TELEPHONE ENCOUNTER
We see the patient didn't go to the ED or [facility].  Dr. Resendiz had a cancellation so I called both his land line and cell phone to see if he could come in today at 3:15 pm.  No answer on land line but I got to leave a message on his cell phone.

## 2024-12-03 ENCOUNTER — OFFICE VISIT (OUTPATIENT)
Dept: PODIATRY | Facility: CLINIC | Age: 59
End: 2024-12-03
Payer: COMMERCIAL

## 2024-12-03 VITALS
SYSTOLIC BLOOD PRESSURE: 124 MMHG | BODY MASS INDEX: 31.66 KG/M2 | TEMPERATURE: 96.5 F | HEIGHT: 66 IN | DIASTOLIC BLOOD PRESSURE: 70 MMHG | WEIGHT: 197 LBS

## 2024-12-03 DIAGNOSIS — E11.9 TYPE 2 DIABETES MELLITUS WITHOUT COMPLICATION, WITHOUT LONG-TERM CURRENT USE OF INSULIN (H): ICD-10-CM

## 2024-12-03 DIAGNOSIS — M79.5 RESIDUAL FOREIGN BODY IN SOFT TISSUE: Primary | ICD-10-CM

## 2024-12-03 DIAGNOSIS — L03.115 CELLULITIS OF RIGHT LOWER EXTREMITY: ICD-10-CM

## 2024-12-03 PROCEDURE — 99213 OFFICE O/P EST LOW 20 MIN: CPT | Performed by: PODIATRIST

## 2024-12-03 ASSESSMENT — PAIN SCALES - GENERAL: PAINLEVEL_OUTOF10: NO PAIN (0)

## 2024-12-03 NOTE — PROGRESS NOTES
Chief Complaint   Patient presents with    RECHECK     Cellulitis Right foot, onset May 2024; XR R foot 11/21/2024; LOV 11/21/2024    Diabetes     Type 2     HPI:  Da Akins is a 59 year old male who is seen in consultation at the request of Minh Benavides MD    DM type 2     Onset May 2024, lesion dorsal lateral aspect Right foot fifth metatarsal caused by a grass 'thorn' or pine needle in his front door rug.  The area remained tender over time and would off-and-on create a tiny spot of drainage all summer long.  11/1/2024 ERIC Cooper - removed a foreign body 'thorn like object'.   11/15/24 started Keflex 500 mg, 1 capsule 2 times daily for 7 days along with applying bactroman 2% ointment 3 times daily.  11/21/2024 patient presented to me and I incised the area.  I was unable to find any specific foreign material.  But left the area open to drain.  Self employed .      Review of Systems:  Patient denies fever, chills, rash, wound, stiffness, limping, numbness, weakness, heart burn, blood in stool, chest pain with activity, calf pain when walking, shortness of breath with activity, chronic cough, easy bleeding/bruising, swelling of ankles, excessive thirst, fatigue, depression, anxiety.  Patient admits only to symptoms noted in history.     Patient Active Problem List   Diagnosis    Benign essential hypertension    Primary osteoarthritis of both hips    Femoral acetabular impingement    Mixed hyperlipidemia    Type 2 diabetes mellitus without complication, without long-term current use of insulin (H)     PAST MEDICAL HISTORY:   Past Medical History:   Diagnosis Date    Arthritis 2017    Arthritis in right hip    Healthy adult     Hypertension 2005    Take prescription    Seasonal allergies     Type 2 diabetes mellitus with hyperglycemia, without long-term current use of insulin (H) 7/29/2023     PAST SURGICAL HISTORY:   Past Surgical History:   Procedure Laterality Date    ANKLE  SURGERY      COLONOSCOPY N/A 1/23/2023    Procedure: COLONOSCOPY;  Surgeon: Jose Montanez MD;  Location: [facility]    gnaglion cyst remove Right      MEDICATIONS:   Current Outpatient Medications:     aspirin 81 MG EC tablet, Take 1 tablet (81 mg) by mouth daily, Disp: , Rfl:     atorvastatin (LIPITOR) 40 MG tablet, Take 1 tablet (40 mg) by mouth daily, Disp: 90 tablet, Rfl: 4    blood glucose (NO BRAND SPECIFIED) lancets standard, Use to test blood sugar once times daily or as directed., Disp: 100 each, Rfl: 11    blood glucose (NO BRAND SPECIFIED) test strip, Use to test blood sugar 1 times daily or as directed., Disp: 100 strip, Rfl: 11    blood glucose monitoring (NO BRAND SPECIFIED) meter device kit, Use to test blood sugar 1 times daily or as directed., Disp: 1 kit, Rfl: 0    carvedilol (COREG) 6.25 MG tablet, Take 1 tablet (6.25 mg) by mouth 2 times daily (with meals), Disp: 180 tablet, Rfl: 4    chlorthalidone (HYGROTON) 25 MG tablet, Take 1 tablet (25 mg) by mouth daily, Disp: 90 tablet, Rfl: 4    losartan (COZAAR) 100 MG tablet, Take 1 tablet (100 mg) by mouth daily, Disp: 90 tablet, Rfl: 4    metFORMIN (GLUCOPHAGE XR) 500 MG 24 hr tablet, Take 2 tablets (1,000 mg) by mouth 2 times daily (with meals), Disp: 360 tablet, Rfl: 1    mupirocin (BACTROBAN) 2 % external ointment, Apply topically 3 times daily., Disp: 30 g, Rfl: 2    ipratropium (ATROVENT) 0.03 % nasal spray, Spray 2 sprays into both nostrils every 12 hours (Patient not taking: Reported on 12/3/2024), Disp: 30 mL, Rfl: 1    ketoconazole (NIZORAL) 2 % external cream, Apply topically daily (Patient not taking: Reported on 12/3/2024), Disp: 60 g, Rfl: 3    triamcinolone (KENALOG) 0.5 % external ointment, , Disp: , Rfl:   ALLERGIES:    Allergies   Allergen Reactions    Seasonal Allergies      SOCIAL HISTORY:   Social History     Socioeconomic History    Marital status: Single     Spouse name: Not on file    Number of children: 3    Years of  education: Not on file    Highest education level: Not on file   Occupational History    Not on file   Tobacco Use    Smoking status: Never    Smokeless tobacco: Never   Vaping Use    Vaping status: Never Used   Substance and Sexual Activity    Alcohol use: Yes    Drug use: No    Sexual activity: Yes     Partners: Female     Birth control/protection: Male Surgical     Comment: 3 children.   Other Topics Concern    Parent/sibling w/ CABG, MI or angioplasty before 65F 55M? No   Social History Narrative    Not on file     Social Drivers of Health     Financial Resource Strain: Low Risk  (8/7/2024)    Financial Resource Strain     Within the past 12 months, have you or your family members you live with been unable to get utilities (heat, electricity) when it was really needed?: No   Food Insecurity: Low Risk  (8/7/2024)    Food Insecurity     Within the past 12 months, did you worry that your food would run out before you got money to buy more?: No     Within the past 12 months, did the food you bought just not last and you didn t have money to get more?: No   Transportation Needs: Low Risk  (8/7/2024)    Transportation Needs     Within the past 12 months, has lack of transportation kept you from medical appointments, getting your medicines, non-medical meetings or appointments, work, or from getting things that you need?: No   Physical Activity: Insufficiently Active (8/7/2024)    Exercise Vital Sign     Days of Exercise per Week: 1 day     Minutes of Exercise per Session: 20 min   Stress: No Stress Concern Present (8/7/2024)    [employer] - Occupational Stress Questionnaire     Feeling of Stress : Not at all   Social Connections: Unknown (8/7/2024)    Social Connection and Isolation Panel [NHANES]     Frequency of Communication with Friends and Family: Not on file     Frequency of Social Gatherings with Friends and Family: Once a week     Attends Hoahaoism Services: Not on file     Active  "Member of Clubs or Organizations: Not on file     Attends Club or Organization Meetings: Not on file     Marital Status: Not on file   Interpersonal Safety: Low Risk  (11/1/2024)    Interpersonal Safety     Do you feel physically and emotionally safe where you currently live?: Yes     Within the past 12 months, have you been hit, slapped, kicked or otherwise physically hurt by someone?: No     Within the past 12 months, have you been humiliated or emotionally abused in other ways by your partner or ex-partner?: No   Housing Stability: Low Risk  (8/7/2024)    Housing Stability     Do you have housing? : Yes     Are you worried about losing your housing?: No     FAMILY HISTORY:   Family History   Problem Relation Age of Onset    Colon Cancer Mother 72    Diabetes Father     Hypertension Father     Cerebrovascular Disease Father     Colon Cancer Father 50    Cerebrovascular Disease Maternal Grandfather     Coronary Artery Disease No family hx of     Hyperlipidemia No family hx of     Prostate Cancer No family hx of      EXAM:Vitals: /70 (BP Location: Left arm, Patient Position: Sitting, Cuff Size: Adult Large)   Temp (!) 96.5  F (35.8  C) (Temporal)   Ht 1.664 m (5' 5.5\")   Wt 89.4 kg (197 lb)   BMI 32.28 kg/m    BMI= Body mass index is 32.28 kg/m .    General appearance: Patient is alert and fully cooperative with history & exam.  No sign of distress is noted during the visit.    Psychiatric: Affect is pleasant & appropriate.  Patient appears motivated to improve health.    Respiratory: Breathing is regular & unlabored while sitting.    HEENT: Hearing is intact to spoken word.  Speech is clear.  No gross evidence of visual impairment that would impact ambulation.    Vascular: DP 2/4 & PT 2/4 left & right.  CFT delayed with dependent rubor noted about the digits.  Diminished hair growth distal to mid tibia and no hair about the foot and toes.  Temperature changes noted, warm to cool proximal to distal.  " Hemosiderin pigmentation noted with multiple varicosities legs and feet bilateral. Generalized edema bilateral legs and feet.  Pt denies claudication history.     Neurologic: Normal plantar response bilateral.  Intact  protective threshold plus 10/10 applications of a 5.07 monofilament.  Pt admits no burning and paraesthesias about the feet and toes with palpation.    Dermatologic: Adequate texture turgor tone about the integument.  On the lateral dorsal aspect of the right fifth metatarsal just proximal to the metatarsal phalangeal joint the area that was previously incised is a dry eschar now.  There is no erythema or heat only minimal tenderness.    Musculoskeletal: Patient is ambulatory without assistive device or brace.  There is semi reducible contracture of the lesser digits.    Radiographs:  3 views 11/21/2024 demonstrate foreign radiopaque material consistent with a small radiopaque fiber about the lateral fifth metatarsal head.                Hemoglobin A1C (%)   Date Value   08/08/2024 7.9 (H)   02/08/2024 7.5 (H)   10/23/2023 8.6 (H)   07/20/2023 8.5 (H)   06/09/2022 6.4 (H)   11/08/2018 6.2 (H)     Creatinine (mg/dL)   Date Value   02/08/2024 1.14   11/21/2023 1.56 (H)   10/23/2023 1.69 (H)   09/29/2023 1.82 (H)   07/27/2023 1.10   07/23/2023 0.95   01/19/2021 0.89   03/24/2020 0.86   08/09/2019 0.86   10/11/2017 0.96   04/15/2016 0.99       ASSESSMENT:     ICD-10-CM    1. Residual foreign body in soft tissue  M79.5 XR Foot Right G/E 3 Views      2. Type 2 diabetes mellitus without complication, without long-term current use of insulin (H)  E11.9 Orthopedic  Referral     XR Foot Right G/E 3 Views      3. Cellulitis of right lower extremity  L03.115 Orthopedic  Referral     XR Foot Right G/E 3 Views        PLAN:    11/21/2024  Informed consent was obtained to explore the area as it was open and a radiodense foreign object is identified on radiographs.  It has been open off and on since  May 2024.  I prepped with Betadine.  Discussed options.  I incised the area with a 15 blade scalpel and palpated throughout the lateral right fifth metatarsal but could not identify any metallic object.  Bleeding was controlled with direct pressure and Lumicain.  About 2 mL of bleeding noted throughout.  Patient is already on antibiotics that he was started by primary care, Keflex.  Follow-up in 2 weeks and if any inflammation edema or irritation remains or any risk of off-and-on wounds continue would recommend open evaluation in the OR with FluoroScan.    Patient is planning a hip replacement right side with Dr. Vieyra in 1/8/24 so we want to make certain this is resolved closed not going to be continued problem for his hip replacement.    12/3/2024  We discussed removing it versus monitoring.    I then reviewed the case with Dr. Rodriguez orthopedic surgeon who will be replacing his hip in January.  Dr. Vieyra arthroplasty would like to move forward with removing the fiber if possible to make certain there is no wound that could create problems or risk of osteomyelitis following the hip implant.  Therefore I ordered repeat radiographs and requested the patient follow-up to obtain the radiographs and then I will call him if there is still foreign material present and consider excising it.  This was discussed with the patient and all questions were answered.    After reviewing the radiographs the fibers still present and it would pose a risk of continued ulceration.  We discussed with the patient via telephone delaying the hip and waiting to see if the wound heals or surgically removing the foreign material that has been keeping his foot open and draining since May.  He would like to move forward with removing this we will get this scheduled as soon as possible and hopefully be able to remove it completely and get the wound closed before his scheduled hip and all questions were answered.  We will move forward with  scheduling.      Stanton Resendiz DPM    Please schedule for surgery, pre op H&P, and post ops.    Surgery:  Patient Name:  Da Akins ([id number])  Procedure:   Excision foreign body right foot.  Diagnosis:    Foreign body right foot  Assistant: first assist  Surgeon:  Stanton Resendiz DPM  Anesthesia:  MAC  PT type:  Same Day Surgery  Time needed: 75 minutes  Patient position:  lying supine  With large black sandbag under hip to keep foot vertical  Mini fluoro:  Yes  C-arm:  no  Equipment: All soft tissue  Suture 3-0, 4-0 Vicryl, 4-0 prolene  Anticoagulation:  No  Vendor:  no  Surgeon Notes: Patient is having hip replaced with [facility] 1/8/2024    Post op appts:    5-7 days po  12-15 days po  approx 4 weeks    Expected work restrictions:  full weight bearing in post op shoe    [facility] Discussed:  NO    Urgency to Schedule: First available patient preference not medical preference.  The foreign material must be removed from his foot and healed before he can have his scheduled hip replacement with Dr. Vieyra 1/8/2024.    Specimens:   None        Allergies   Allergen Reactions    Seasonal Allergies      Risk of blood loss: less than 100mL    Dressings:  Adaptic, box of 4x4s, gauze roll, ACE compression postop shoe    Local:  0.5% marcaine plain for all MAC anesthesia    Tourniquet:  Ankle tourniquet if MAC anesthesia and it will not impede the expected incision placement.     Any communication needed to other team members?

## 2024-12-03 NOTE — LETTER
12/3/2024      Da Akins  [id number] [address] [place] [id number]      Dear Colleague,    Thank you for referring your patient, Da Akins, to the [facility]. Please see a copy of my visit note below.    Chief Complaint   Patient presents with     RECHECK     Cellulitis Right foot, onset May 2024; XR R foot 11/21/2024; LOV 11/21/2024     Diabetes     Type 2     HPI:  Da Akins is a 59 year old male who is seen in consultation at the request of Minh Benavides MD    DM type 2     Onset May 2024, lesion dorsal lateral aspect Right foot fifth metatarsal caused by a grass 'thorn' or pine needle in his front door rug.  The area remained tender over time and would off-and-on create a tiny spot of drainage all summer long.  11/1/2024 ERIC Cooper - removed a foreign body 'thorn like object'.   11/15/24 started Keflex 500 mg, 1 capsule 2 times daily for 7 days along with applying bactroman 2% ointment 3 times daily.  11/21/2024 patient presented to me and I incised the area.  I was unable to find any specific foreign material.  But left the area open to drain.  Self employed .      Review of Systems:  Patient denies fever, chills, rash, wound, stiffness, limping, numbness, weakness, heart burn, blood in stool, chest pain with activity, calf pain when walking, shortness of breath with activity, chronic cough, easy bleeding/bruising, swelling of ankles, excessive thirst, fatigue, depression, anxiety.  Patient admits only to symptoms noted in history.     Patient Active Problem List   Diagnosis     Benign essential hypertension     Primary osteoarthritis of both hips     Femoral acetabular impingement     Mixed hyperlipidemia     Type 2 diabetes mellitus without complication, without long-term current use of insulin (H)     PAST MEDICAL HISTORY:   Past Medical History:   Diagnosis Date     Arthritis 2017    Arthritis in right hip     Healthy adult      Hypertension  2005    Take prescription     Seasonal allergies      Type 2 diabetes mellitus with hyperglycemia, without long-term current use of insulin (H) 7/29/2023     PAST SURGICAL HISTORY:   Past Surgical History:   Procedure Laterality Date     ANKLE SURGERY       COLONOSCOPY N/A 1/23/2023    Procedure: COLONOSCOPY;  Surgeon: Jose Montanez MD;  Location: [facility]     gnaglion cyst remove Right      MEDICATIONS:   Current Outpatient Medications:      aspirin 81 MG EC tablet, Take 1 tablet (81 mg) by mouth daily, Disp: , Rfl:      atorvastatin (LIPITOR) 40 MG tablet, Take 1 tablet (40 mg) by mouth daily, Disp: 90 tablet, Rfl: 4     blood glucose (NO BRAND SPECIFIED) lancets standard, Use to test blood sugar once times daily or as directed., Disp: 100 each, Rfl: 11     blood glucose (NO BRAND SPECIFIED) test strip, Use to test blood sugar 1 times daily or as directed., Disp: 100 strip, Rfl: 11     blood glucose monitoring (NO BRAND SPECIFIED) meter device kit, Use to test blood sugar 1 times daily or as directed., Disp: 1 kit, Rfl: 0     carvedilol (COREG) 6.25 MG tablet, Take 1 tablet (6.25 mg) by mouth 2 times daily (with meals), Disp: 180 tablet, Rfl: 4     chlorthalidone (HYGROTON) 25 MG tablet, Take 1 tablet (25 mg) by mouth daily, Disp: 90 tablet, Rfl: 4     losartan (COZAAR) 100 MG tablet, Take 1 tablet (100 mg) by mouth daily, Disp: 90 tablet, Rfl: 4     metFORMIN (GLUCOPHAGE XR) 500 MG 24 hr tablet, Take 2 tablets (1,000 mg) by mouth 2 times daily (with meals), Disp: 360 tablet, Rfl: 1     mupirocin (BACTROBAN) 2 % external ointment, Apply topically 3 times daily., Disp: 30 g, Rfl: 2     ipratropium (ATROVENT) 0.03 % nasal spray, Spray 2 sprays into both nostrils every 12 hours (Patient not taking: Reported on 12/3/2024), Disp: 30 mL, Rfl: 1     ketoconazole (NIZORAL) 2 % external cream, Apply topically daily (Patient not taking: Reported on 12/3/2024), Disp: 60 g, Rfl: 3     triamcinolone (KENALOG) 0.5 %  external ointment, , Disp: , Rfl:   ALLERGIES:    Allergies   Allergen Reactions     Seasonal Allergies      SOCIAL HISTORY:   Social History     Socioeconomic History     Marital status: Single     Spouse name: Not on file     Number of children: 3     Years of education: Not on file     Highest education level: Not on file   Occupational History     Not on file   Tobacco Use     Smoking status: Never     Smokeless tobacco: Never   Vaping Use     Vaping status: Never Used   Substance and Sexual Activity     Alcohol use: Yes     Drug use: No     Sexual activity: Yes     Partners: Female     Birth control/protection: Male Surgical     Comment: 3 children.   Other Topics Concern     Parent/sibling w/ CABG, MI or angioplasty before 65F 55M? No   Social History Narrative     Not on file     Social Drivers of Health     Financial Resource Strain: Low Risk  (8/7/2024)    Financial Resource Strain      Within the past 12 months, have you or your family members you live with been unable to get utilities (heat, electricity) when it was really needed?: No   Food Insecurity: Low Risk  (8/7/2024)    Food Insecurity      Within the past 12 months, did you worry that your food would run out before you got money to buy more?: No      Within the past 12 months, did the food you bought just not last and you didn t have money to get more?: No   Transportation Needs: Low Risk  (8/7/2024)    Transportation Needs      Within the past 12 months, has lack of transportation kept you from medical appointments, getting your medicines, non-medical meetings or appointments, work, or from getting things that you need?: No   Physical Activity: Insufficiently Active (8/7/2024)    Exercise Vital Sign      Days of Exercise per Week: 1 day      Minutes of Exercise per Session: 20 min   Stress: No Stress Concern Present (8/7/2024)    [employer] - Occupational Stress Questionnaire      Feeling of Stress : Not at all  "  Social Connections: Unknown (8/7/2024)    Social Connection and Isolation Panel [NHANES]      Frequency of Communication with Friends and Family: Not on file      Frequency of Social Gatherings with Friends and Family: Once a week      Attends [employer] Services: Not on file      Active Member of Clubs or Organizations: Not on file      Attends Club or Organization Meetings: Not on file      Marital Status: Not on file   Interpersonal Safety: Low Risk  (11/1/2024)    Interpersonal Safety      Do you feel physically and emotionally safe where you currently live?: Yes      Within the past 12 months, have you been hit, slapped, kicked or otherwise physically hurt by someone?: No      Within the past 12 months, have you been humiliated or emotionally abused in other ways by your partner or ex-partner?: No   Housing Stability: Low Risk  (8/7/2024)    Housing Stability      Do you have housing? : Yes      Are you worried about losing your housing?: No     FAMILY HISTORY:   Family History   Problem Relation Age of Onset     Colon Cancer Mother 72     Diabetes Father      Hypertension Father      Cerebrovascular Disease Father      Colon Cancer Father 50     Cerebrovascular Disease Maternal Grandfather      Coronary Artery Disease No family hx of      Hyperlipidemia No family hx of      Prostate Cancer No family hx of      EXAM:Vitals: /70 (BP Location: Left arm, Patient Position: Sitting, Cuff Size: Adult Large)   Temp (!) 96.5  F (35.8  C) (Temporal)   Ht 1.664 m (5' 5.5\")   Wt 89.4 kg (197 lb)   BMI 32.28 kg/m    BMI= Body mass index is 32.28 kg/m .    General appearance: Patient is alert and fully cooperative with history & exam.  No sign of distress is noted during the visit.    Psychiatric: Affect is pleasant & appropriate.  Patient appears motivated to improve health.    Respiratory: Breathing is regular & unlabored while sitting.    HEENT: Hearing is intact to spoken word.  Speech is clear.  No gross " evidence of visual impairment that would impact ambulation.    Vascular: DP 2/4 & PT 2/4 left & right.  CFT delayed with dependent rubor noted about the digits.  Diminished hair growth distal to mid tibia and no hair about the foot and toes.  Temperature changes noted, warm to cool proximal to distal.  Hemosiderin pigmentation noted with multiple varicosities legs and feet bilateral. Generalized edema bilateral legs and feet.  Pt denies claudication history.     Neurologic: Normal plantar response bilateral.  Intact  protective threshold plus 10/10 applications of a 5.07 monofilament.  Pt admits no burning and paraesthesias about the feet and toes with palpation.    Dermatologic: Adequate texture turgor tone about the integument.  On the lateral dorsal aspect of the right fifth metatarsal just proximal to the metatarsal phalangeal joint the area that was previously incised is a dry eschar now.  There is no erythema or heat only minimal tenderness.    Musculoskeletal: Patient is ambulatory without assistive device or brace.  There is semi reducible contracture of the lesser digits.    Radiographs:  3 views 11/21/2024 demonstrate foreign radiopaque material consistent with a small radiopaque fiber about the lateral fifth metatarsal head.                Hemoglobin A1C (%)   Date Value   08/08/2024 7.9 (H)   02/08/2024 7.5 (H)   10/23/2023 8.6 (H)   07/20/2023 8.5 (H)   06/09/2022 6.4 (H)   11/08/2018 6.2 (H)     Creatinine (mg/dL)   Date Value   02/08/2024 1.14   11/21/2023 1.56 (H)   10/23/2023 1.69 (H)   09/29/2023 1.82 (H)   07/27/2023 1.10   07/23/2023 0.95   01/19/2021 0.89   03/24/2020 0.86   08/09/2019 0.86   10/11/2017 0.96   04/15/2016 0.99       ASSESSMENT:     ICD-10-CM    1. Residual foreign body in soft tissue  M79.5 XR Foot Right G/E 3 Views      2. Type 2 diabetes mellitus without complication, without long-term current use of insulin (H)  E11.9 Orthopedic  Referral     XR Foot Right G/E 3 Views       3. Cellulitis of right lower extremity  L03.115 Orthopedic  Referral     XR Foot Right G/E 3 Views        PLAN:    11/21/2024  Informed consent was obtained to explore the area as it was open and a radiodense foreign object is identified on radiographs.  It has been open off and on since May 2024.  I prepped with Betadine.  Discussed options.  I incised the area with a 15 blade scalpel and palpated throughout the lateral right fifth metatarsal but could not identify any metallic object.  Bleeding was controlled with direct pressure and Lumicain.  About 2 mL of bleeding noted throughout.  Patient is already on antibiotics that he was started by primary care, Keflex.  Follow-up in 2 weeks and if any inflammation edema or irritation remains or any risk of off-and-on wounds continue would recommend open evaluation in the OR with FluoroScan.    Patient is planning a hip replacement right side with Dr. Vieyra in 1/8/24 so we want to make certain this is resolved closed not going to be continued problem for his hip replacement.    12/3/2024  We discussed removing it versus monitoring.    I then reviewed the case with Dr. Rodriguez orthopedic surgeon who will be replacing his hip in January.  Dr. Vieyra arthroplasty would like to move forward with removing the fiber if possible to make certain there is no wound that could create problems or risk of osteomyelitis following the hip implant.  Therefore I ordered repeat radiographs and requested the patient follow-up to obtain the radiographs and then I will call him if there is still foreign material present and consider excising it.  This was discussed with the patient and all questions were answered.    After reviewing the radiographs the fibers still present and it would pose a risk of continued ulceration.  We discussed with the patient via telephone delaying the hip and waiting to see if the wound heals or surgically removing the foreign material that has  been keeping his foot open and draining since May.  He would like to move forward with removing this we will get this scheduled as soon as possible and hopefully be able to remove it completely and get the wound closed before his scheduled hip and all questions were answered.  We will move forward with scheduling.      Stanton Resendiz DPM    Please schedule for surgery, pre op H&P, and post ops.    Surgery:  Patient Name:  Da Akins ([id number])  Procedure:   Excision foreign body right foot.  Diagnosis:    Foreign body right foot  Assistant: first assist  Surgeon:  Stanton Resendiz DPM  Anesthesia:  MAC  PT type:  Same Day Surgery  Time needed: 75 minutes  Patient position:  lying supine  With large black sandbag under hip to keep foot vertical  Mini fluoro:  Yes  C-arm:  no  Equipment: All soft tissue  Suture 3-0, 4-0 Vicryl, 4-0 prolene  Anticoagulation:  No  Vendor:  no  Surgeon Notes: Patient is having hip replaced with [facility] 1/8/2024    Post op appts:    5-7 days po  12-15 days po  approx 4 weeks    Expected work restrictions:  full weight bearing in post op shoe    [facility] Discussed:  NO    Urgency to Schedule: First available patient preference not medical preference.  The foreign material must be removed from his foot and healed before he can have his scheduled hip replacement with Dr. Vieyra 1/8/2024.    Specimens:   None        Allergies   Allergen Reactions     Seasonal Allergies      Risk of blood loss: less than 100mL    Dressings:  Adaptic, box of 4x4s, gauze roll, ACE compression postop shoe    Local:  0.5% marcaine plain for all MAC anesthesia    Tourniquet:  Ankle tourniquet if MAC anesthesia and it will not impede the expected incision placement.     Any communication needed to other team members?                   Again, thank you for allowing me to participate in the care of your patient.        Sincerely,        Stanton Resendiz DPM

## 2024-12-04 ENCOUNTER — ANCILLARY PROCEDURE (OUTPATIENT)
Dept: GENERAL RADIOLOGY | Facility: CLINIC | Age: 59
End: 2024-12-04
Attending: PODIATRIST
Payer: COMMERCIAL

## 2024-12-05 ENCOUNTER — MYC MEDICAL ADVICE (OUTPATIENT)
Dept: FAMILY MEDICINE | Facility: CLINIC | Age: 59
End: 2024-12-05
Payer: COMMERCIAL

## 2024-12-05 NOTE — TELEPHONE ENCOUNTER
"Next Monday 12/9 at 9:40 \"provider's schedule time\"  this will cover the 30 days preoperative time for both procedures.    Will have staff notify patient.     Minh Benavides MD   "

## 2024-12-09 ENCOUNTER — OFFICE VISIT (OUTPATIENT)
Dept: FAMILY MEDICINE | Facility: CLINIC | Age: 59
End: 2024-12-09
Payer: COMMERCIAL

## 2024-12-09 VITALS
BODY MASS INDEX: 31.5 KG/M2 | HEIGHT: 66 IN | SYSTOLIC BLOOD PRESSURE: 116 MMHG | TEMPERATURE: 97.1 F | HEART RATE: 57 BPM | RESPIRATION RATE: 16 BRPM | DIASTOLIC BLOOD PRESSURE: 86 MMHG | OXYGEN SATURATION: 96 % | WEIGHT: 196 LBS

## 2024-12-09 DIAGNOSIS — M79.671 RIGHT FOOT PAIN: ICD-10-CM

## 2024-12-09 DIAGNOSIS — I10 BENIGN ESSENTIAL HYPERTENSION: ICD-10-CM

## 2024-12-09 DIAGNOSIS — M16.11 PRIMARY OSTEOARTHRITIS OF RIGHT HIP: ICD-10-CM

## 2024-12-09 DIAGNOSIS — E11.9 TYPE 2 DIABETES MELLITUS WITHOUT COMPLICATION, WITHOUT LONG-TERM CURRENT USE OF INSULIN (H): ICD-10-CM

## 2024-12-09 DIAGNOSIS — M25.859 FEMORAL ACETABULAR IMPINGEMENT: ICD-10-CM

## 2024-12-09 DIAGNOSIS — Z01.818 PREOP GENERAL PHYSICAL EXAM: Primary | ICD-10-CM

## 2024-12-09 DIAGNOSIS — M79.5 FOREIGN BODY (FB) IN SOFT TISSUE: ICD-10-CM

## 2024-12-09 LAB
ANION GAP SERPL CALCULATED.3IONS-SCNC: 12 MMOL/L (ref 7–15)
BUN SERPL-MCNC: 24.3 MG/DL (ref 6–20)
CALCIUM SERPL-MCNC: 9.6 MG/DL (ref 8.8–10.4)
CHLORIDE SERPL-SCNC: 100 MMOL/L (ref 98–107)
CREAT SERPL-MCNC: 1.22 MG/DL (ref 0.67–1.17)
EGFRCR SERPLBLD CKD-EPI 2021: 69 ML/MIN/1.73M2
ERYTHROCYTE [DISTWIDTH] IN BLOOD BY AUTOMATED COUNT: 13.2 % (ref 10–15)
EST. AVERAGE GLUCOSE BLD GHB EST-MCNC: 169 MG/DL
GLUCOSE SERPL-MCNC: 117 MG/DL (ref 70–99)
HBA1C MFR BLD: 7.5 %
HCO3 SERPL-SCNC: 28 MMOL/L (ref 22–29)
HCT VFR BLD AUTO: 39.6 % (ref 40–53)
HGB BLD-MCNC: 13.5 G/DL (ref 13.3–17.7)
MCH RBC QN AUTO: 28.7 PG (ref 26.5–33)
MCHC RBC AUTO-ENTMCNC: 34.1 G/DL (ref 31.5–36.5)
MCV RBC AUTO: 84 FL (ref 78–100)
PLATELET # BLD AUTO: 262 10E3/UL (ref 150–450)
POTASSIUM SERPL-SCNC: 3.6 MMOL/L (ref 3.4–5.3)
RBC # BLD AUTO: 4.71 10E6/UL (ref 4.4–5.9)
SODIUM SERPL-SCNC: 140 MMOL/L (ref 135–145)
WBC # BLD AUTO: 8.6 10E3/UL (ref 4–11)

## 2024-12-09 PROCEDURE — 99214 OFFICE O/P EST MOD 30 MIN: CPT | Performed by: FAMILY MEDICINE

## 2024-12-09 PROCEDURE — 85027 COMPLETE CBC AUTOMATED: CPT | Performed by: FAMILY MEDICINE

## 2024-12-09 PROCEDURE — 80048 BASIC METABOLIC PNL TOTAL CA: CPT | Performed by: FAMILY MEDICINE

## 2024-12-09 PROCEDURE — 83036 HEMOGLOBIN GLYCOSYLATED A1C: CPT | Performed by: FAMILY MEDICINE

## 2024-12-09 PROCEDURE — G2211 COMPLEX E/M VISIT ADD ON: HCPCS | Performed by: FAMILY MEDICINE

## 2024-12-09 PROCEDURE — 36415 COLL VENOUS BLD VENIPUNCTURE: CPT | Performed by: FAMILY MEDICINE

## 2024-12-09 ASSESSMENT — PAIN SCALES - GENERAL: PAINLEVEL_OUTOF10: NO PAIN (0)

## 2024-12-09 NOTE — PATIENT INSTRUCTIONS
How to Take Your Medication Before Surgery  Preoperative Medication Instructions   Antiplatelet or Anticoagulation Medication Instructions   - aspirin: Discontinue aspirin 7-10 days prior to procedure to reduce bleeding risk. It should be resumed postoperatively.     Additional Medication Instructions  Take all scheduled medications on the day of surgery EXCEPT for modifications listed below:   - ACE/ARB: (losartan) DO NOT TAKE on day of surgery (minimum 11 hours for general anesthesia).   - Diuretics: (chlorthalidone) DO NOT TAKE on the day of surgery.   - metformin: DO NOT TAKE day of surgery.       Patient Education   Preparing for Your Surgery  For Adults  Getting started  In most cases, a nurse will call to review your health history and instructions. They will give you an arrival time based on your scheduled surgery time. Please be ready to share:  Your doctor's clinic name and phone number  Your medical, surgical, and anesthesia history  A list of allergies and sensitivities  A list of medicines, including herbal treatments and over-the-counter drugs  Whether the patient has a legal guardian (ask how to send us the papers in advance)  Note: You may not receive a call if you were seen at our PAC (Preoperative Assessment Center).  Please tell us if you're pregnant--or if there's any chance you might be pregnant. Some surgeries may injure a fetus (unborn baby), so they require a pregnancy test. Surgeries that are safe for a fetus don't always need a test, and you can choose whether to have one.   Preparing for surgery  Within 10 to 30 days of surgery: Have a pre-op exam (sometimes called an H&P, or History and Physical). This can be done at a clinic or pre-operative center.  If you're having a , you may not need this exam. Talk to your care team.  At your pre-op exam, talk to your care team about all medicines you take. (This includes CBD oil and any drugs, such as THC, marijuana, and other forms of  cannabis.) If you need to stop any medicine before surgery, ask when to start taking it again.  This is for your safety. Many medicines and drugs can make you bleed too much during surgery. Some change how well surgery (anesthesia) drugs work.  Call your insurance company to let them know you're having surgery. (If you don't have insurance, call [phone].)  Call your clinic if there's any change in your health. This includes a scrape or scratch near the surgery site, or any signs of a cold (sore throat, runny nose, cough, rash, fever).  Eating and drinking guidelines  For your safety: Unless your surgeon tells you otherwise, follow the guidelines below.  Eat and drink as normal until 8 hours before you arrive for surgery. After that, no food or milk. You can spit out gum when you arrive.  Drink clear liquids until 2 hours before you arrive. These are liquids you can see through, like water, Gatorade, and Propel Water. They also include plain black coffee and tea (no cream or milk).  No alcohol for 24 hours before you arrive. The night before surgery, stop any drinks that contain THC.  If your care team tells you to take medicine on the morning of surgery, it's okay to take it with a sip of water. No other medicines or drugs are allowed (including CBD oil)--follow your care team's instructions.  If you have questions the day of surgery, call your hospital or surgery center.   Preventing infection  Shower or bathe the night before and the morning of surgery. Follow the instructions your clinic gave you. (If no instructions, use regular soap.)  Don't shave or clip hair near your surgery site. We'll remove the hair if needed.  Don't smoke or vape the morning of surgery. No chewing tobacco for 6 hours before you arrive. A nicotine patch is okay. You may spit out nicotine gum when you arrive.  For some surgeries, the surgeon will tell you to fully quit smoking and nicotine.  We will make every effort to keep you safe  from infection. We will:  Clean our hands often with soap and water (or an alcohol-based hand rub).  Clean the skin at your surgery site with a special soap that kills germs.  Give you a special gown to keep you warm. (Cold raises the risk of infection.)  Wear hair covers, masks, gowns, and gloves during surgery.  Give antibiotic medicine, if prescribed. Not all surgeries need this medicine.  What to bring on the day of surgery  Photo ID and insurance card  Copy of your health care directive, if you have one  Glasses and hearing aids (bring cases)  You can't wear contacts during surgery  Inhaler and eye drops, if you use them (tell us about these when you arrive)  CPAP machine or breathing device, if you use them  A few personal items, if spending the night  If you have . . .  A pacemaker, ICD (cardiac defibrillator), or other implant: Bring the ID card.  An implanted stimulator: Bring the remote control.  A legal guardian: Bring a copy of the certified (court-stamped) guardianship papers.  Please remove any jewelry, including body piercings. Leave jewelry and other valuables at home.  If you're going home the day of surgery  You must have a responsible adult drive you home. They should stay with you overnight as well.  If you don't have someone to stay with you, and you aren't safe to go home alone, we may keep you overnight. Insurance often won't pay for this.  After surgery  If it's hard to control your pain or you need more pain medicine, please call your surgeon's office.  Questions?   If you have any questions for your care team, list them here:   ____________________________________________________________________________________________________________________________________________________________________________________________________________________________________________________________  For informational purposes only. Not to replace the advice of your health care provider. Copyright   2003, 2019  [facility]. All rights reserved. Clinically reviewed by Vega Love MD. [facility] 982003 - REV 08/24.

## 2024-12-09 NOTE — PROGRESS NOTES
Preoperative Evaluation  [address] [address]  Phone: [phone]  Fax: [phone]  Primary Provider: Minh Benavides MD  Pre-op Performing Provider: Minh Benavides MD  Dec 9, 2024             12/8/2024   Surgical Information   What procedure is being done? Right foot Surgert on foot and hip replacement    Facility or Hospital where procedure/surgery will be performed: [facility]    Who is doing the procedure / surgery? Dr. Astrid Resendiz  /selene winter    Date of surgery / procedure: 12/13/24 12/13  and  1/8    Time of surgery / procedure: 12:30pm 1230  right foot removing sliver    Where do you plan to recover after surgery? At home with family at home with family        Patient-reported     Fax number for surgical facility: Note does not need to be faxed, will be available electronically in Epic.    Assessment & Plan     The proposed surgical procedure is considered INTERMEDIATE risk.    ASSESSMENT/ORDERS:    ICD-10-CM    1. Preop general physical exam  Z01.818 Basic metabolic panel     Hemoglobin A1c     CBC with platelets     CBC with platelets     Hemoglobin A1c     Basic metabolic panel      2. Foreign body (FB) in soft tissue  M79.5       3. Femoral acetabular impingement  M25.859       4. Right foot pain  M79.671       5. Primary osteoarthritis of right hip  M16.11       6. Benign essential hypertension  I10 Basic metabolic panel     CBC with platelets     CBC with platelets     Basic metabolic panel      7. Type 2 diabetes mellitus without complication, without long-term current use of insulin (H)  E11.9 Basic metabolic panel     Hemoglobin A1c     CBC with platelets     CBC with platelets     Hemoglobin A1c     Basic metabolic panel        PLAN:   Flu and COVID-19 declined.  Risks discussed.    The longitudinal plan of care for the diagnosis(es)/condition(s) as documented were addressed during this visit. Due to the added  complexity in care, I will continue to support Da in the subsequent management and with ongoing continuity of care.        - No identified additional risk factors other than previously addressed    Preoperative Medication Instructions  Antiplatelet or Anticoagulation Medication Instructions   - aspirin: Discontinue aspirin 7-10 days prior to procedure to reduce bleeding risk. It should be resumed postoperatively.     Additional Medication Instructions  Take all scheduled medications on the day of surgery EXCEPT for modifications listed below:   - ACE/ARB: (losartan) DO NOT TAKE on day of surgery (minimum 11 hours for general anesthesia).   - Diuretics: (chlorthalidone) DO NOT TAKE on the day of surgery.   - metformin: DO NOT TAKE day of surgery.    Recommendation  Approval given to proceed with proposed procedure, without further diagnostic evaluation.    Subjective   Da is a 59 year old, presenting for the following:  Pre-Op Exam (DOS 12/13/24, right foot, Dr Resendiz [place])          12/9/2024     9:22 AM   Additional Questions   Roomed by Eva ROBERTSON related to upcoming procedure: has foreign body in right foot causing infection.    Recommended for right total hip arthroplasty next month due to osteoarthritis.        12/8/2024   Pre-Op Questionnaire   Have you ever had a heart attack or stroke? No    Have you ever had surgery on your heart or blood vessels, such as a stent placement, a coronary artery bypass, or surgery on an artery in your head, neck, heart, or legs? No    Do you have chest pain with activity? No    Do you have a history of heart failure? No    Do you currently have a cold, bronchitis or symptoms of other infection? No    Do you have a cough, shortness of breath, or wheezing? No    Do you or anyone in your family have previous history of blood clots? No    Do you or does anyone in your family have a serious bleeding problem such as prolonged bleeding following surgeries or cuts? No     Have you ever had problems with anemia or been told to take iron pills? No    Have you had any abnormal blood loss such as black, tarry or bloody stools? No    Have you ever had a blood transfusion? No    Are you willing to have a blood transfusion if it is medically needed before, during, or after your surgery? Yes    Have you or any of your relatives ever had problems with anesthesia? No    Do you have sleep apnea, excessive snoring or daytime drowsiness? No    Do you have any artifical heart valves or other implanted medical devices like a pacemaker, defibrillator, or continuous glucose monitor? No    Do you have artificial joints? No    Are you allergic to latex? No        Patient-reported     Health Care Directive  Patient does not have a Health Care Directive: Discussed advance care planning with patient; information given to patient to review.    Preoperative Review of    reviewed - no record of controlled substances prescribed.      Status of Chronic Conditions:  DIABETES - Patient has a longstanding history of DiabetesType Type II . Patient is being treated with oral agents and denies significant side effects. Control has been good. Complicating factors include but are not limited to: hypertension and hyperlipidemia.     HYPERTENSION - Patient has longstanding history of HTN , currently denies any symptoms referable to elevated blood pressure. Specifically denies chest pain, palpitations, dyspnea, orthopnea, PND or peripheral edema. Blood pressure readings have been in normal range. Current medication regimen is as listed below. Patient denies any side effects of medication.     Patient Active Problem List    Diagnosis Date Noted    Type 2 diabetes mellitus without complication, without long-term current use of insulin (H) 07/29/2023     Priority: Medium    Mixed hyperlipidemia 07/03/2022     Priority: Medium    Primary osteoarthritis of both hips 06/30/2022     Priority: Medium    Femoral  acetabular impingement 06/30/2022     Priority: Medium    Benign essential hypertension 10/11/2017     Priority: Medium      Past Medical History:   Diagnosis Date    Arthritis 2017    Arthritis in right hip    Healthy adult     Hypertension 2005    Take prescription    Seasonal allergies     Type 2 diabetes mellitus with hyperglycemia, without long-term current use of insulin (H) 7/29/2023     Past Surgical History:   Procedure Laterality Date    ANKLE SURGERY      COLONOSCOPY N/A 1/23/2023    Procedure: COLONOSCOPY;  Surgeon: Jose Montanez MD;  Location: [facility]    gnaglion cyst remove Right      Current Outpatient Medications   Medication Sig Dispense Refill    aspirin 81 MG EC tablet Take 1 tablet (81 mg) by mouth daily      atorvastatin (LIPITOR) 40 MG tablet Take 1 tablet (40 mg) by mouth daily 90 tablet 4    blood glucose (NO BRAND SPECIFIED) lancets standard Use to test blood sugar once times daily or as directed. 100 each 11    blood glucose (NO BRAND SPECIFIED) test strip Use to test blood sugar 1 times daily or as directed. 100 strip 11    blood glucose monitoring (NO BRAND SPECIFIED) meter device kit Use to test blood sugar 1 times daily or as directed. 1 kit 0    carvedilol (COREG) 6.25 MG tablet Take 1 tablet (6.25 mg) by mouth 2 times daily (with meals) 180 tablet 4    chlorthalidone (HYGROTON) 25 MG tablet Take 1 tablet (25 mg) by mouth daily 90 tablet 4    losartan (COZAAR) 100 MG tablet Take 1 tablet (100 mg) by mouth daily 90 tablet 4    metFORMIN (GLUCOPHAGE XR) 500 MG 24 hr tablet Take 2 tablets (1,000 mg) by mouth 2 times daily (with meals) 360 tablet 1    mupirocin (BACTROBAN) 2 % external ointment Apply topically 3 times daily. 30 g 2       Allergies   Allergen Reactions    Seasonal Allergies         Social History     Tobacco Use    Smoking status: Never    Smokeless tobacco: Never   Substance Use Topics    Alcohol use: Yes       History   Drug Use No               Objective    BP  "116/86   Pulse 57   Temp 97.1  F (36.2  C) (Temporal)   Resp 16   Ht 1.664 m (5' 5.5\")   Wt 88.9 kg (196 lb)   SpO2 96%   BMI 32.12 kg/m     Estimated body mass index is 32.12 kg/m  as calculated from the following:    Height as of this encounter: 1.664 m (5' 5.5\").    Weight as of this encounter: 88.9 kg (196 lb).  Physical Exam  GENERAL: alert and no distress  EYES: Eyes grossly normal to inspection, PERRL and conjunctivae and sclerae normal  HENT: ear canals and TM's normal, nose and mouth without ulcers or lesions  NECK: no adenopathy, no asymmetry, masses, or scars  RESP: lungs clear to auscultation - no rales, rhonchi or wheezes  CV: regular rate and rhythm, normal S1 S2, no S3 or S4, no murmur, click or rub, no peripheral edema  ABDOMEN: soft, nontender, no hepatosplenomegaly, no masses and bowel sounds normal  MS: no gross musculoskeletal defects noted, no edema except for healing sore on right lateral dorsal foot and obvious pain with right hip flexion.  SKIN: no suspicious lesions or rashes  NEURO: Normal strength and tone, mentation intact and speech normal  PSYCH: mentation appears normal, affect normal/bright    Recent Labs   Lab Test 12/09/24  0922 08/08/24  1458 02/08/24  1405   HGB 13.5  --   --      --   --    NA  --   --  140   POTASSIUM  --   --  3.9   CR  --   --  1.14   A1C  --  7.9* 7.5*           Diagnostics  Recent Results (from the past 24 hours)   CBC with platelets    Collection Time: 12/09/24  9:22 AM   Result Value Ref Range    WBC Count 8.6 4.0 - 11.0 10e3/uL    RBC Count 4.71 4.40 - 5.90 10e6/uL    Hemoglobin 13.5 13.3 - 17.7 g/dL    Hematocrit 39.6 (L) 40.0 - 53.0 %    MCV 84 78 - 100 fL    MCH 28.7 26.5 - 33.0 pg    MCHC 34.1 31.5 - 36.5 g/dL    RDW 13.2 10.0 - 15.0 %    Platelet Count 262 150 - 450 10e3/uL   Hemoglobin A1c    Collection Time: 12/09/24  9:22 AM   Result Value Ref Range    Estimated Average Glucose 169 (H) <117 mg/dL    Hemoglobin A1C 7.5 (H) <5.7 % "   Basic metabolic panel    Collection Time: 12/09/24  9:22 AM   Result Value Ref Range    Sodium 140 135 - 145 mmol/L    Potassium 3.6 3.4 - 5.3 mmol/L    Chloride 100 98 - 107 mmol/L    Carbon Dioxide (CO2) 28 22 - 29 mmol/L    Anion Gap 12 7 - 15 mmol/L    Urea Nitrogen 24.3 (H) 6.0 - 20.0 mg/dL    Creatinine 1.22 (H) 0.67 - 1.17 mg/dL    GFR Estimate 69 >60 mL/min/1.73m2    Calcium 9.6 8.8 - 10.4 mg/dL    Glucose 117 (H) 70 - 99 mg/dL      No EKG required, no history of coronary heart disease, significant arrhythmia, peripheral arterial disease or other structural heart disease.    Revised Cardiac Risk Index (RCRI)  The patient has the following serious cardiovascular risks for perioperative complications:   - No serious cardiac risks = 0 points     RCRI Interpretation: 0 points: Class I (very low risk - 0.4% complication rate)         Signed Electronically by: Minh Benavides MD  A copy of this evaluation report is provided to the requesting physician.

## 2024-12-13 ENCOUNTER — APPOINTMENT (OUTPATIENT)
Dept: GENERAL RADIOLOGY | Facility: CLINIC | Age: 59
End: 2024-12-13
Attending: PODIATRIST
Payer: COMMERCIAL

## 2024-12-13 ENCOUNTER — HOSPITAL ENCOUNTER (OUTPATIENT)
Facility: CLINIC | Age: 59
Discharge: HOME | End: 2024-12-13
Attending: PODIATRIST | Admitting: PODIATRIST
Payer: COMMERCIAL

## 2024-12-13 VITALS
OXYGEN SATURATION: 94 % | SYSTOLIC BLOOD PRESSURE: 106 MMHG | RESPIRATION RATE: 16 BRPM | HEART RATE: 61 BPM | TEMPERATURE: 98.2 F | DIASTOLIC BLOOD PRESSURE: 74 MMHG

## 2024-12-13 DIAGNOSIS — M16.0 PRIMARY OSTEOARTHRITIS OF BOTH HIPS: ICD-10-CM

## 2024-12-13 DIAGNOSIS — M25.859 FEMORAL ACETABULAR IMPINGEMENT: ICD-10-CM

## 2024-12-13 DIAGNOSIS — E11.9 TYPE 2 DIABETES MELLITUS WITHOUT COMPLICATION, WITHOUT LONG-TERM CURRENT USE OF INSULIN (H): Primary | ICD-10-CM

## 2024-12-13 DIAGNOSIS — M79.5 RESIDUAL FOREIGN BODY IN SOFT TISSUE: ICD-10-CM

## 2024-12-13 DIAGNOSIS — I10 BENIGN ESSENTIAL HYPERTENSION: ICD-10-CM

## 2024-12-13 DIAGNOSIS — E78.2 MIXED HYPERLIPIDEMIA: ICD-10-CM

## 2024-12-13 LAB — GLUCOSE BLDC GLUCOMTR-MCNC: 132 MG/DL (ref 70–99)

## 2024-12-13 PROCEDURE — 82962 GLUCOSE BLOOD TEST: CPT

## 2024-12-13 PROCEDURE — 272N000001 HC OR GENERAL SUPPLY STERILE: Performed by: PODIATRIST

## 2024-12-13 PROCEDURE — 999N000141 HC STATISTIC PRE-PROCEDURE NURSING ASSESSMENT: Performed by: PODIATRIST

## 2024-12-13 PROCEDURE — 710N000012 HC RECOVERY PHASE 2, PER MINUTE: Performed by: PODIATRIST

## 2024-12-13 PROCEDURE — 360N000075 HC SURGERY LEVEL 2, PER MIN: Performed by: PODIATRIST

## 2024-12-13 PROCEDURE — 28190 REMOVAL OF FOOT FOREIGN BODY: CPT | Mod: RT | Performed by: PODIATRIST

## 2024-12-13 PROCEDURE — 250N000011 HC RX IP 250 OP 636: Performed by: PODIATRIST

## 2024-12-13 PROCEDURE — 370N000017 HC ANESTHESIA TECHNICAL FEE, PER MIN: Performed by: PODIATRIST

## 2024-12-13 PROCEDURE — 999N000179 XR SURGERY CARM FLUORO LESS THAN 5 MIN W STILLS: Mod: TC

## 2024-12-13 RX ORDER — CEFAZOLIN SODIUM/WATER 2 G/20 ML
2 SYRINGE (ML) INTRAVENOUS SEE ADMIN INSTRUCTIONS
Status: DISCONTINUED | OUTPATIENT
Start: 2024-12-13 | End: 2024-12-13 | Stop reason: HOSPADM

## 2024-12-13 RX ORDER — ONDANSETRON 4 MG/1
4 TABLET, ORALLY DISINTEGRATING ORAL EVERY 30 MIN PRN
Status: DISCONTINUED | OUTPATIENT
Start: 2024-12-13 | End: 2024-12-13 | Stop reason: HOSPADM

## 2024-12-13 RX ORDER — NALOXONE HYDROCHLORIDE 0.4 MG/ML
0.1 INJECTION, SOLUTION INTRAMUSCULAR; INTRAVENOUS; SUBCUTANEOUS
Status: DISCONTINUED | OUTPATIENT
Start: 2024-12-13 | End: 2024-12-13 | Stop reason: HOSPADM

## 2024-12-13 RX ORDER — OXYCODONE HYDROCHLORIDE 5 MG/1
5 TABLET ORAL
Status: DISCONTINUED | OUTPATIENT
Start: 2024-12-13 | End: 2024-12-13 | Stop reason: HOSPADM

## 2024-12-13 RX ORDER — DEXAMETHASONE SODIUM PHOSPHATE 10 MG/ML
4 INJECTION, SOLUTION INTRAMUSCULAR; INTRAVENOUS
Status: DISCONTINUED | OUTPATIENT
Start: 2024-12-13 | End: 2024-12-13 | Stop reason: HOSPADM

## 2024-12-13 RX ORDER — BUPIVACAINE HYDROCHLORIDE 5 MG/ML
INJECTION, SOLUTION PERINEURAL PRN
Status: DISCONTINUED | OUTPATIENT
Start: 2024-12-13 | End: 2024-12-13 | Stop reason: HOSPADM

## 2024-12-13 RX ORDER — ONDANSETRON 2 MG/ML
4 INJECTION INTRAMUSCULAR; INTRAVENOUS EVERY 30 MIN PRN
Status: DISCONTINUED | OUTPATIENT
Start: 2024-12-13 | End: 2024-12-13 | Stop reason: HOSPADM

## 2024-12-13 RX ORDER — FENTANYL CITRATE 50 UG/ML
25 INJECTION, SOLUTION INTRAMUSCULAR; INTRAVENOUS
Status: DISCONTINUED | OUTPATIENT
Start: 2024-12-13 | End: 2024-12-13 | Stop reason: HOSPADM

## 2024-12-13 RX ORDER — CEFAZOLIN SODIUM/WATER 2 G/20 ML
2 SYRINGE (ML) INTRAVENOUS
Status: COMPLETED | OUTPATIENT
Start: 2024-12-13 | End: 2024-12-13

## 2024-12-13 RX ORDER — LIDOCAINE 40 MG/G
CREAM TOPICAL
Status: DISCONTINUED | OUTPATIENT
Start: 2024-12-13 | End: 2024-12-13 | Stop reason: HOSPADM

## 2024-12-13 RX ORDER — OXYCODONE HYDROCHLORIDE 5 MG/1
10 TABLET ORAL
Status: DISCONTINUED | OUTPATIENT
Start: 2024-12-13 | End: 2024-12-13 | Stop reason: HOSPADM

## 2024-12-13 RX ORDER — OXYCODONE HYDROCHLORIDE 5 MG/1
5 TABLET ORAL EVERY 4 HOURS PRN
Qty: 15 TABLET | Refills: 0 | Status: SHIPPED | OUTPATIENT
Start: 2024-12-13 | End: 2024-12-18

## 2024-12-13 ASSESSMENT — ACTIVITIES OF DAILY LIVING (ADL)
ADLS_ACUITY_SCORE: 35

## 2024-12-13 NOTE — OP NOTE
Procedure date: 12/13/2024     SURGEON:  Stanton Resendiz DPM     ASSISTANT:  None.    PREOPERATIVE DIAGNOSIS:  Type 2 diabetes mellitus without complication, without long-term current use of insulin (H) [E11.9]  Cellulitis of right lower extremity [L03.115]  Residual foreign body in soft tissue [M79.5].     POSTOPERATIVE DIAGNOSIS:    Type 2 diabetes mellitus without complication, without long-term current use of insulin (H) [E11.9]  Cellulitis of right lower extremity [L03.115]  Residual foreign body in soft tissue [M79.5].     PLANNED PROCEDURE:    Procedure(s):  Excision foreign body right foot.    Estimated Blood Loss: None      Specimens:  none     DESCRIPTION OF PROCEDURE:  In the preoperative holding area I obtained informed consent to treat Type 2 diabetes mellitus without complication, without long-term current use of insulin (H) [E11.9]  Cellulitis of right lower extremity [L03.115]  Residual foreign body in soft tissue [M79.5] with treatment of Procedure(s):  Excision foreign body right foot.  I discussed with the patient potential risks and complications as well as alternative treatment options and post op care requirements.  I answered all questions for the patient.  No guarantees were given or implied.  They wish to proceed with surgical treatment.  They have been NPO consistent with current facility guidelines.  No contraindications to surgical treatment are identified at this time.      The patient was brought into the operating room and placed on the operating table in a supine position.  The anesthesia team induced MAC anesthesia.  A well-padded right ankle tourniquet was applied with copious amounts of Webril padding and secured with foam tape.  The patient was positioned lying supine throughout the procedure.  The extremity was prepped and draped in the usual sterile fashion.  A timeout was performed. The extremity was exsanguinated with an Esmarch and the right ankle tourniquet was inflated to  250 mmHg.   A total of 20 mL of half percent Marcaine plain will were injected about the surgical site throughout the entire procedure.    A 15 blade was utilized to make a 2 cm incision over the lateral fifth metatarsal after triangulating the foreign material with 2 25-gauge needles and FluoroScan.  Self-retaining finger retractors were utilized for retraction of all soft tissue throughout the procedure.  Metzenbaum scissors and [place] elevator were utilized for blunt dissection.  Small areas of bleeding were cauterized utilizing a Bovie.  A metallic fiber was identified just beneath the skin in this and surrounding soft tissue was excised with a 15 blade scalpel and pickup.  The FluoroScan was then utilized to confirm all components of radiodense material was removed from the area.      The surgical site was flushed with copious amounts of sterile saline.  Soft tissue structures were closed with 4-0 Vicryl, and skin closed with a 4-0 Prolene.      Adaptic and a bulky bolstered sterile dressing was applied to the surgical site.  Compression dressing was applied.  The tourniquet was released after approximately 20 to minutes followed by immediate hyperemia to all 5 digits of the extremity.  The patient tolerated the procedure and anesthesia well and was brought back to recovery room with vital signs stable and vascular status intact to the extremity.     Stanton Resendiz DPM

## 2024-12-13 NOTE — PROGRESS NOTES
No chief complaint on file.    HPI:  Da Akins is a 59 year old male who is seen in consultation at the request of Minh Benavides MD    DM type 2     Onset May 2024, lesion dorsal lateral aspect Right foot fifth metatarsal caused by a grass 'thorn' or pine needle in his front door rug.  The area remained tender over time and would off-and-on create a tiny spot of drainage all summer long.  11/1/2024 ERIC Cooper - removed a foreign body 'thorn like object'.   11/15/24 started Keflex 500 mg, 1 capsule 2 times daily for 7 days along with applying bactroman 2% ointment 3 times daily.  11/21/2024 patient presented to me and I incised the area.  I was unable to find any specific foreign material.  But left the area open to drain.  Self employed .      Review of Systems:  Patient denies fever, chills, rash, wound, stiffness, limping, numbness, weakness, heart burn, blood in stool, chest pain with activity, calf pain when walking, shortness of breath with activity, chronic cough, easy bleeding/bruising, swelling of ankles, excessive thirst, fatigue, depression, anxiety.  Patient admits only to symptoms noted in history.     Patient Active Problem List   Diagnosis    Benign essential hypertension    Primary osteoarthritis of both hips    Femoral acetabular impingement    Mixed hyperlipidemia    Type 2 diabetes mellitus without complication, without long-term current use of insulin (H)     PAST MEDICAL HISTORY:   Past Medical History:   Diagnosis Date    Arthritis 2017    Arthritis in right hip    Healthy adult     Hypertension 2005    Take prescription    Seasonal allergies     Type 2 diabetes mellitus with hyperglycemia, without long-term current use of insulin (H) 7/29/2023     PAST SURGICAL HISTORY:   Past Surgical History:   Procedure Laterality Date    ANKLE SURGERY      COLONOSCOPY N/A 1/23/2023    Procedure: COLONOSCOPY;  Surgeon: Jose Montanez MD;  Location: [facility]  cyst remove Right      MEDICATIONS:   Current Facility-Administered Medications:     ceFAZolin Sodium (ANCEF) injection 2 g, 2 g, Intravenous, Pre-Op/Pre-procedure x 1 dose, Stanton Resendiz DPM    ceFAZolin Sodium (ANCEF) injection 2 g, 2 g, Intravenous, See Admin Instructions, Stanton Resendiz DPM    lidocaine (LMX4) cream, , Topical, Q1H PRN, Rey Pineda APRN CRNA    lidocaine 1 % 0.1-1 mL, 0.1-1 mL, Other, Q1H PRN, Rey Pineda APRN CRNA    sodium chloride (PF) 0.9% PF flush 3 mL, 3 mL, Intracatheter, Q8H, Rey Pineda APRN CRNA    sodium chloride (PF) 0.9% PF flush 3 mL, 3 mL, Intracatheter, q1 min prn, Rey Pineda A APRN CRNA  ALLERGIES:    Allergies   Allergen Reactions    Seasonal Allergies      SOCIAL HISTORY:   Social History     Socioeconomic History    Marital status: Single     Spouse name: Not on file    Number of children: 3    Years of education: Not on file    Highest education level: Not on file   Occupational History    Not on file   Tobacco Use    Smoking status: Never    Smokeless tobacco: Never   Vaping Use    Vaping status: Never Used   Substance and Sexual Activity    Alcohol use: Yes    Drug use: No    Sexual activity: Yes     Partners: Female     Birth control/protection: Male Surgical     Comment: 3 children.   Other Topics Concern    Parent/sibling w/ CABG, MI or angioplasty before 65F 55M? No   Social History Narrative    Not on file     Social Drivers of Health     Financial Resource Strain: Low Risk  (8/7/2024)    Financial Resource Strain     Within the past 12 months, have you or your family members you live with been unable to get utilities (heat, electricity) when it was really needed?: No   Food Insecurity: Low Risk  (8/7/2024)    Food Insecurity     Within the past 12 months, did you worry that your food would run out before you got money to buy more?: No     Within the past 12 months, did the food you bought just not last and you didn t have money to  get more?: No   Transportation Needs: Low Risk  (8/7/2024)    Transportation Needs     Within the past 12 months, has lack of transportation kept you from medical appointments, getting your medicines, non-medical meetings or appointments, work, or from getting things that you need?: No   Physical Activity: Insufficiently Active (8/7/2024)    Exercise Vital Sign     Days of Exercise per Week: 1 day     Minutes of Exercise per Session: 20 min   Stress: No Stress Concern Present (8/7/2024)    [place] [employer] - Occupational Stress Questionnaire     Feeling of Stress : Not at all   Social Connections: Unknown (8/7/2024)    Social Connection and Isolation Panel [NHANES]     Frequency of Communication with Friends and Family: Not on file     Frequency of Social Gatherings with Friends and Family: Once a week     Attends [employer] Services: Not on file     Active Member of Clubs or Organizations: Not on file     Attends Club or Organization Meetings: Not on file     Marital Status: Not on file   Interpersonal Safety: Low Risk  (11/1/2024)    Interpersonal Safety     Do you feel physically and emotionally safe where you currently live?: Yes     Within the past 12 months, have you been hit, slapped, kicked or otherwise physically hurt by someone?: No     Within the past 12 months, have you been humiliated or emotionally abused in other ways by your partner or ex-partner?: No   Housing Stability: Low Risk  (8/7/2024)    Housing Stability     Do you have housing? : Yes     Are you worried about losing your housing?: No     FAMILY HISTORY:   Family History   Problem Relation Age of Onset    Colon Cancer Mother 72    Diabetes Father     Hypertension Father     Cerebrovascular Disease Father     Colon Cancer Father 50    Cerebrovascular Disease Maternal Grandfather     Coronary Artery Disease No family hx of     Hyperlipidemia No family hx of     Prostate Cancer No family hx of      EXAM:Vitals: /81    Temp 98.2  F (36.8  C) (Oral)   Resp 16   SpO2 95%   BMI= There is no height or weight on file to calculate BMI.    General appearance: Patient is alert and fully cooperative with history & exam.  No sign of distress is noted during the visit.    Psychiatric: Affect is pleasant & appropriate.  Patient appears motivated to improve health.    Respiratory: Breathing is regular & unlabored while sitting.    HEENT: Hearing is intact to spoken word.  Speech is clear.  No gross evidence of visual impairment that would impact ambulation.    Vascular: DP 2/4 & PT 2/4 left & right.  CFT delayed with dependent rubor noted about the digits.  Diminished hair growth distal to mid tibia and no hair about the foot and toes.  Temperature changes noted, warm to cool proximal to distal.  Hemosiderin pigmentation noted with multiple varicosities legs and feet bilateral. Generalized edema bilateral legs and feet.  Pt denies claudication history.     Neurologic: Normal plantar response bilateral.  Intact  protective threshold plus 10/10 applications of a 5.07 monofilament.  Pt admits no burning and paraesthesias about the feet and toes with palpation.    Dermatologic: Adequate texture turgor tone about the integument.  On the lateral dorsal aspect of the right fifth metatarsal just proximal to the metatarsal phalangeal joint the area that was previously incised is a dry eschar now.  There is no erythema or heat only minimal tenderness.    Musculoskeletal: Patient is ambulatory without assistive device or brace.  There is semi reducible contracture of the lesser digits.    Radiographs:  3 views 11/21/2024 demonstrate foreign radiopaque material consistent with a small radiopaque fiber about the lateral fifth metatarsal head.                Hemoglobin A1C (%)   Date Value   12/09/2024 7.5 (H)   08/08/2024 7.9 (H)   02/08/2024 7.5 (H)   10/23/2023 8.6 (H)   07/20/2023 8.5 (H)   06/09/2022 6.4 (H)   11/08/2018 6.2 (H)     Creatinine  (mg/dL)   Date Value   12/09/2024 1.22 (H)   02/08/2024 1.14   11/21/2023 1.56 (H)   10/23/2023 1.69 (H)   09/29/2023 1.82 (H)   07/27/2023 1.10   01/19/2021 0.89   03/24/2020 0.86   08/09/2019 0.86   10/11/2017 0.96   04/15/2016 0.99       ASSESSMENT:     ICD-10-CM    1. Residual foreign body in soft tissue  M79.5 XR Surgery SANDRA L/T 5 Min Fluoro w Stills     XR Surgery SANDRA L/T 5 Min Fluoro w Stills        PLAN:    11/21/2024  Informed consent was obtained to explore the area as it was open and a radiodense foreign object is identified on radiographs.  It has been open off and on since May 2024.  I prepped with Betadine.  Discussed options.  I incised the area with a 15 blade scalpel and palpated throughout the lateral right fifth metatarsal but could not identify any metallic object.  Bleeding was controlled with direct pressure and Lumicain.  About 2 mL of bleeding noted throughout.  Patient is already on antibiotics that he was started by primary care, Keflex.  Follow-up in 2 weeks and if any inflammation edema or irritation remains or any risk of off-and-on wounds continue would recommend open evaluation in the OR with FluoroScan.    Patient is planning a hip replacement right side with Dr. Vieyra in 1/8/24 so we want to make certain this is resolved closed not going to be continued problem for his hip replacement.    12/3/2024  We discussed removing it versus monitoring.    I then reviewed the case with Dr. Rodriguez orthopedic surgeon who will be replacing his hip in January.  Dr. Vieyra arthroplasty would like to move forward with removing the fiber if possible to make certain there is no wound that could create problems or risk of osteomyelitis following the hip implant.  Therefore I ordered repeat radiographs and requested the patient follow-up to obtain the radiographs and then I will call him if there is still foreign material present and consider excising it.  This was discussed with the patient and  all questions were answered.    After reviewing the radiographs the fibers still present and it would pose a risk of continued ulceration.  We discussed with the patient via telephone delaying the hip and waiting to see if the wound heals or surgically removing the foreign material that has been keeping his foot open and draining since May.  He would like to move forward with removing this we will get this scheduled as soon as possible and hopefully be able to remove it completely and get the wound closed before his scheduled hip and all questions were answered.  We will move forward with scheduling.    12/13/2024  I obtained informed consent to treat Type 2 diabetes mellitus without complication, without long-term current use of insulin (H) [E11.9]  Cellulitis of right lower extremity [L03.115]  Residual foreign body in soft tissue [M79.5] with treatment of Procedure(s):  Excision foreign body right foot.  I discussed with the patient potential risks and complications as well as alternative treatment options and post op care requirements.   The patients clinical exam and indications are unchanged.  I answered all questions for the patient.  No guarantees were given or implied.  They wish to proceed with surgical treatment.  They have been NPO for 8 hours or more.  No contraindications to surgical treatment at this time.          Stanton Resendiz DPM    Please schedule for surgery, pre op H&P, and post ops.    Surgery:  Patient Name:  Da Akins ([id number])  Procedure:   Excision foreign body right foot.  Diagnosis:    Foreign body right foot  Assistant: first assist  Surgeon:  Stanton Resendiz DPM  Anesthesia:  MAC  PT type:  Same Day Surgery  Time needed: 75 minutes  Patient position:  lying supine  With large black sandbag under hip to keep foot vertical  Mini fluoro:  Yes  C-arm:  no  Equipment: All soft tissue  Suture 3-0, 4-0 Vicryl, 4-0 prolene  Anticoagulation:  No  Vendor:  no  Surgeon Notes: Patient is  having hip replaced with Jarrell 1/8/2024    Post op appts:    5-7 days po  12-15 days po  approx 4 weeks    Expected work restrictions:  full weight bearing in post op shoe    [facility] Discussed:  NO    Urgency to Schedule: First available patient preference not medical preference.  The foreign material must be removed from his foot and healed before he can have his scheduled hip replacement with Dr. Vieyra 1/8/2024.    Specimens:   None        Allergies   Allergen Reactions    Seasonal Allergies      Risk of blood loss: less than 100mL    Dressings:  Adaptic, box of 4x4s, gauze roll, ACE compression postop shoe    Local:  0.5% marcaine plain for all MAC anesthesia    Tourniquet:  Ankle tourniquet if MAC anesthesia and it will not impede the expected incision placement.     Any communication needed to other team members?

## 2024-12-18 ENCOUNTER — ALLIED HEALTH/NURSE VISIT (OUTPATIENT)
Dept: FAMILY MEDICINE | Facility: CLINIC | Age: 59
End: 2024-12-18
Payer: COMMERCIAL

## 2024-12-18 VITALS
DIASTOLIC BLOOD PRESSURE: 78 MMHG | HEART RATE: 59 BPM | RESPIRATION RATE: 18 BRPM | TEMPERATURE: 97.1 F | SYSTOLIC BLOOD PRESSURE: 126 MMHG | OXYGEN SATURATION: 97 %

## 2024-12-18 DIAGNOSIS — M79.5 RESIDUAL FOREIGN BODY IN SOFT TISSUE: ICD-10-CM

## 2024-12-18 DIAGNOSIS — Z48.01 ENCOUNTER FOR CHANGE OR REMOVAL OF SURGICAL WOUND DRESSING: Primary | ICD-10-CM

## 2024-12-18 DIAGNOSIS — E11.9 TYPE 2 DIABETES MELLITUS WITHOUT COMPLICATION, WITHOUT LONG-TERM CURRENT USE OF INSULIN (H): ICD-10-CM

## 2024-12-18 RX ORDER — OXYCODONE HYDROCHLORIDE 5 MG/1
5 TABLET ORAL EVERY 4 HOURS PRN
Qty: 15 TABLET | Refills: 0 | Status: SHIPPED | OUTPATIENT
Start: 2024-12-18

## 2024-12-18 ASSESSMENT — PAIN SCALES - GENERAL: PAINLEVEL_OUTOF10: NO PAIN (0)

## 2024-12-18 NOTE — PROGRESS NOTES
Da Akins is here today for a post-operative dressing change per the order of Dr. Stanton Resendiz.  The patient had an excision foreign body right foot on 12/13/24.  The original post-operative dressing is clean, dry, and intact.  The dressing was removed and the foot/leg was cleansed with soap and water.     The patient reports that they are are using pain medication. They do need a refill today.  Patient reporting their pain is on average a 1/10, increases with activity.      /78   Pulse 59   Temp 97.1  F (36.2  C)   Resp 18   SpO2 97%     The incision was cleansed with Microklenz.  The incision appears to be clean, dry, and intact. There is minimal swelling and bruising.  There is minimal old drainage.         The foot/leg was dressed with an adaptic over the incision, shingled guaze, kerlix, and ace wraps. Patient was placed back in post-op shoe.    The patient was educated about the sign and symptoms that would warrant calling or seeking emergent medical care.  The patient was also re-educated about the importance of resting, ice, elevation and the compression dressing. The patient expressed understanding.     The patient has an appointment set up with Dr. Resendiz next week.      Nya Phillip RN   [facility]

## 2024-12-18 NOTE — TELEPHONE ENCOUNTER
Patient was here for post op visit with RN, please see note for additional details. He is requesting a refill of his oxycodone at this time.     Nya Phillip RN   [facility]

## 2024-12-20 PROBLEM — M16.11 PRIMARY OSTEOARTHRITIS OF RIGHT HIP: Status: ACTIVE | Noted: 2024-12-20

## 2024-12-20 PROBLEM — Z96.641 HISTORY OF TOTAL RIGHT HIP REPLACEMENT: Status: ACTIVE | Noted: 2024-12-20

## 2024-12-24 ENCOUNTER — OFFICE VISIT (OUTPATIENT)
Dept: PODIATRY | Facility: CLINIC | Age: 59
End: 2024-12-24
Payer: COMMERCIAL

## 2024-12-24 VITALS
HEIGHT: 66 IN | WEIGHT: 196 LBS | DIASTOLIC BLOOD PRESSURE: 62 MMHG | BODY MASS INDEX: 31.5 KG/M2 | TEMPERATURE: 96.2 F | SYSTOLIC BLOOD PRESSURE: 106 MMHG

## 2024-12-24 DIAGNOSIS — M79.5 RESIDUAL FOREIGN BODY IN SOFT TISSUE: ICD-10-CM

## 2024-12-24 DIAGNOSIS — E11.9 TYPE 2 DIABETES MELLITUS WITHOUT COMPLICATION, WITHOUT LONG-TERM CURRENT USE OF INSULIN (H): Primary | ICD-10-CM

## 2024-12-24 ASSESSMENT — PAIN SCALES - GENERAL: PAINLEVEL_OUTOF10: NO PAIN (0)

## 2024-12-24 NOTE — LETTER
"12/24/2024      Da Akins  [id number] [address] [place] [id number]      Dear Colleague,    Thank you for referring your patient, Da Akins, to the [facility]. Please see a copy of my visit note below.    Chief Complaint   Patient presents with     Surgical Followup     (11d) WB w/post op shoe, no fevers; DOS 12/13/2024 - Excision foreign body right foot     Diabetes     Type 2       Subjective: Patient reports for followup of DOS 12/13/2024 - Excision foreign body right foot procedure.  Patient continues pain medication but is tapering off. Patient denies nausea vomiting fever or chills.     EXAM:  No apparent distress, patient is relaxed and comfortable.   Vitals: /62 (BP Location: Left arm, Patient Position: Sitting, Cuff Size: Adult Large)   Temp (!) 96.2  F (35.7  C) (Temporal)   Ht 1.664 m (5' 5.5\")   Wt 88.9 kg (196 lb)   BMI 32.12 kg/m    Vasc:  DP and PT pulses palpable bilateral, CFT immediate to all digits and surrounding the surgical site.  Neuro:  Light touch sensation intact about the digits. There is minimal to no appreciable numbness around the surgical incision with examination.  Derm: The incision is well approximated and dry. Sutures are intact. Mild edema as expected. There is no surrounding erythema, heat, drainage or other signs of infection or hematoma.  Musc: Adequate reduction of deformity identified. No complications.    Assessment:      ICD-10-CM    1. Type 2 diabetes mellitus without complication, without long-term current use of insulin (H)  E11.9       2. Residual foreign body in soft tissue  M79.5           Plan:    12/24/2024  The dressing was removed and surgical site inspected.   All sutures were removed.  A bulky sterile dressing was applied with compression.  He can return to regular bathing but no soaking or submersion for a week.  May return to regular moisturizing of the skin to prepare for his hip replacement in 2 or 3 weeks.  Patient " will follow-up in 2 weeks just before his hip replacement to confirm the wound is healed.    Stanton Resendiz DPM      Again, thank you for allowing me to participate in the care of your patient.        Sincerely,        Stanton Resendiz DPM    Electronically signed

## 2024-12-24 NOTE — PROGRESS NOTES
"Chief Complaint   Patient presents with    Surgical Followup     (11d) WB w/post op shoe, no fevers; DOS 12/13/2024 - Excision foreign body right foot    Diabetes     Type 2       Subjective: Patient reports for followup of DOS 12/13/2024 - Excision foreign body right foot procedure.  Patient continues pain medication but is tapering off. Patient denies nausea vomiting fever or chills.     EXAM:  No apparent distress, patient is relaxed and comfortable.   Vitals: /62 (BP Location: Left arm, Patient Position: Sitting, Cuff Size: Adult Large)   Temp (!) 96.2  F (35.7  C) (Temporal)   Ht 1.664 m (5' 5.5\")   Wt 88.9 kg (196 lb)   BMI 32.12 kg/m    Vasc:  DP and PT pulses palpable bilateral, CFT immediate to all digits and surrounding the surgical site.  Neuro:  Light touch sensation intact about the digits. There is minimal to no appreciable numbness around the surgical incision with examination.  Derm: The incision is well approximated and dry. Sutures are intact. Mild edema as expected. There is no surrounding erythema, heat, drainage or other signs of infection or hematoma.  Musc: Adequate reduction of deformity identified. No complications.    Assessment:      ICD-10-CM    1. Type 2 diabetes mellitus without complication, without long-term current use of insulin (H)  E11.9       2. Residual foreign body in soft tissue  M79.5           Plan:    12/24/2024  The dressing was removed and surgical site inspected.   All sutures were removed.  A bulky sterile dressing was applied with compression.  He can return to regular bathing but no soaking or submersion for a week.  May return to regular moisturizing of the skin to prepare for his hip replacement in 2 or 3 weeks.  Patient will follow-up in 2 weeks just before his hip replacement to confirm the wound is healed.    Stanton Resendiz DPM    "

## 2024-12-30 NOTE — PROGRESS NOTES
Ortho Navigator Note      Pre-op Date 12/9/24     Medical Clearance  CLEARED     Labs Stable      COVID Test Date No longer indicated      Skin  Intact      Activity: Ambulates independently      Equipment Need Patient will NOT likely need a walker for discharge. Defer to PT/OT for recs.       Meds to Hold Held all supplements 14 days prior to surgery      * Medication recommendations are not intended to be exhaustive; they are limited to common medications that are potentially dangerous if incorrectly managed   NPO Instructions  Instructed to stop solids 8 hr prior to arrival and clears 2 hr prior to arrival.       Pre-op Joint Education Complete? States he completed zoom class recently    Discharge Plan Patient has plan to discharge home on morning of POD 1.   Scarlett Soares will arrive at hospital at 0800 to participate in therapy and discharge education. They will then transport patient home    /Transportation Scarlett Soares will be  and transportation.  is physically able to care for patient.      Barriers to Discharge No barriers to discharge.      Additional Info/   Special Needs : Patient had an Excision of foreign body of right foot on 12/13/24 - Has follow up for this on 1/7/25 to ensure incision is closed and no s/s of infection - surgeon aware.         Post Op PT TBD in hospital               12/30/24 3604   Discharge Planning   Patient/Family Anticipates Transition to home with family   Concerns to be Addressed denies needs/concerns at this time   Living Arrangements   People in Home significant other   Type of Residence Private Residence   Is your private residence a single family home or apartment? Single family home   Number of Stairs, Within Home, Primary three;greater than 10 stairs   Stair Railings, Within Home, Primary none   Once home, are you able to live on one level? Yes   Which level? Main Level   Which rooms are not on the main level? Bedroom   Bathroom Shower/Tub Tub/Shower unit    Equipment Currently Used at Home walker, standard   Support System   Support Systems Spouse/Significant Other   Do you have someone available to stay with you one or two nights once you are home? Yes   Medical Clearance   Date of Physical 12/09/24   [facility]   It is recommended that you call and check with any specialty providers before surgery to see if you need surgical clearance.  Do you see any specialty providers outside of your primary care provider? No   Blood   Known Bleeding Disorder or Coagulopathy No   Does the patient have any Caodaism/cultural preferences related to blood products? No   Education   Has the patient scheduled or completed pre-op total joint education, either in class or online, in the last 12 months? Yes   Patient attended total joint pre-op class/received pre-op teaching  online   Relationship/Environment   Name(s) of People in Home Scarlett Soares

## 2025-01-07 ENCOUNTER — OFFICE VISIT (OUTPATIENT)
Dept: PODIATRY | Facility: CLINIC | Age: 60
End: 2025-01-07
Payer: COMMERCIAL

## 2025-01-07 VITALS
HEIGHT: 66 IN | SYSTOLIC BLOOD PRESSURE: 118 MMHG | TEMPERATURE: 96.4 F | BODY MASS INDEX: 31.5 KG/M2 | WEIGHT: 196 LBS | DIASTOLIC BLOOD PRESSURE: 60 MMHG

## 2025-01-07 DIAGNOSIS — E11.9 TYPE 2 DIABETES MELLITUS WITHOUT COMPLICATION, WITHOUT LONG-TERM CURRENT USE OF INSULIN (H): Primary | ICD-10-CM

## 2025-01-07 DIAGNOSIS — L03.115 CELLULITIS OF RIGHT LOWER EXTREMITY: ICD-10-CM

## 2025-01-07 DIAGNOSIS — M79.5 RESIDUAL FOREIGN BODY IN SOFT TISSUE: ICD-10-CM

## 2025-01-07 ASSESSMENT — PAIN SCALES - GENERAL: PAINLEVEL_OUTOF10: NO PAIN (0)

## 2025-01-07 NOTE — PROGRESS NOTES
"Chief Complaint   Patient presents with    Surgical Followup     (3w4d) presents with crocs 1/7/25, no fevers; DOS 12/13/2024 - Excision foreign body right foot; LOV 12/24/2024      Diabetes     Type 2     Subjective: Patient reports for followup of DOS 12/13/2024 - Excision foreign body right foot procedure.  Patient states he feels like his foot is now resolved and there is nothing left in his foot.  He is noted no drainage.  He has been monitoring daily and has return to regular bath or shower.    EXAM:  No apparent distress, patient is relaxed and comfortable.   Vitals: /60 (BP Location: Left arm, Patient Position: Sitting, Cuff Size: Adult Large)   Temp (!) 96.4  F (35.8  C) (Temporal)   Ht 1.664 m (5' 5.5\")   Wt 88.9 kg (196 lb)   BMI 32.12 kg/m    Vasc:  DP and PT pulses palpable bilateral, CFT immediate to all digits and surrounding the surgical site.  Neuro:  Light touch sensation intact about the digits. There is minimal to no appreciable numbness around the surgical incision with examination.  Derm: The incision is well approximated and dry.  Some flaky eschar is noted but any remaining eschar is tight and dry and minimal eschar remains.  No erythema or heat.  Musc: Adequate reduction of deformity identified. No complications.    Assessment:      ICD-10-CM    1. Type 2 diabetes mellitus without complication, without long-term current use of insulin (H)  E11.9       2. Residual foreign body in soft tissue  M79.5       3. Cellulitis of right lower extremity  L03.115         Plan:    12/24/2024  The dressing was removed and surgical site inspected.   All sutures were removed.  A bulky sterile dressing was applied with compression.  He can return to regular bathing but no soaking or submersion for a week.  May return to regular moisturizing of the skin to prepare for his hip replacement in 2 or 3 weeks.  Patient will follow-up in 2 weeks just before his hip replacement to confirm the wound is " healed.    1/7/2025  Recommended continuing moisturizing the skin now return to regular shoe gear regular activities no restrictions.  Follow-up as needed with any questions or concerns.      Stanton Resendiz DPM

## 2025-01-07 NOTE — LETTER
"1/7/2025      Da Akins  [address] [address] [place] [id number]      Dear Colleague,    Thank you for referring your patient, Da Akins, to the [facility]. Please see a copy of my visit note below.    Chief Complaint   Patient presents with     Surgical Followup     (3w4d) presents with crocs 1/7/25, no fevers; DOS 12/13/2024 - Excision foreign body right foot; LOV 12/24/2024       Diabetes     Type 2     Subjective: Patient reports for followup of DOS 12/13/2024 - Excision foreign body right foot procedure.  Patient states he feels like his foot is now resolved and there is nothing left in his foot.  He is noted no drainage.  He has been monitoring daily and has return to regular bath or shower.    EXAM:  No apparent distress, patient is relaxed and comfortable.   Vitals: /60 (BP Location: Left arm, Patient Position: Sitting, Cuff Size: Adult Large)   Temp (!) 96.4  F (35.8  C) (Temporal)   Ht 1.664 m (5' 5.5\")   Wt 88.9 kg (196 lb)   BMI 32.12 kg/m    Vasc:  DP and PT pulses palpable bilateral, CFT immediate to all digits and surrounding the surgical site.  Neuro:  Light touch sensation intact about the digits. There is minimal to no appreciable numbness around the surgical incision with examination.  Derm: The incision is well approximated and dry.  Some flaky eschar is noted but any remaining eschar is tight and dry and minimal eschar remains.  No erythema or heat.  Musc: Adequate reduction of deformity identified. No complications.    Assessment:      ICD-10-CM    1. Type 2 diabetes mellitus without complication, without long-term current use of insulin (H)  E11.9       2. Residual foreign body in soft tissue  M79.5       3. Cellulitis of right lower extremity  L03.115         Plan:    12/24/2024  The dressing was removed and surgical site inspected.   All sutures were removed.  A bulky sterile dressing was applied with compression.  He can return to regular bathing " but no soaking or submersion for a week.  May return to regular moisturizing of the skin to prepare for his hip replacement in 2 or 3 weeks.  Patient will follow-up in 2 weeks just before his hip replacement to confirm the wound is healed.    1/7/2025  Recommended continuing moisturizing the skin now return to regular shoe gear regular activities no restrictions.  Follow-up as needed with any questions or concerns.      Stanton Resendiz DPM          Again, thank you for allowing me to participate in the care of your patient.        Sincerely,        Stanton Resendiz DPM    Electronically signed

## 2025-01-08 ENCOUNTER — APPOINTMENT (OUTPATIENT)
Dept: GENERAL RADIOLOGY | Facility: CLINIC | Age: 60
End: 2025-01-08
Attending: ORTHOPAEDIC SURGERY
Payer: COMMERCIAL

## 2025-01-08 ENCOUNTER — ANESTHESIA (OUTPATIENT)
Dept: SURGERY | Facility: CLINIC | Age: 60
End: 2025-01-08
Payer: COMMERCIAL

## 2025-01-08 ENCOUNTER — APPOINTMENT (OUTPATIENT)
Dept: PHYSICAL THERAPY | Facility: CLINIC | Age: 60
End: 2025-01-08
Attending: ORTHOPAEDIC SURGERY
Payer: COMMERCIAL

## 2025-01-08 ENCOUNTER — HOSPITAL ENCOUNTER (OUTPATIENT)
Facility: CLINIC | Age: 60
Discharge: HOME | End: 2025-01-08
Attending: ORTHOPAEDIC SURGERY | Admitting: ORTHOPAEDIC SURGERY
Payer: COMMERCIAL

## 2025-01-08 ENCOUNTER — ANESTHESIA EVENT (OUTPATIENT)
Dept: SURGERY | Facility: CLINIC | Age: 60
End: 2025-01-08
Payer: COMMERCIAL

## 2025-01-08 VITALS
BODY MASS INDEX: 31.96 KG/M2 | WEIGHT: 198.85 LBS | HEART RATE: 73 BPM | HEIGHT: 66 IN | TEMPERATURE: 97.9 F | RESPIRATION RATE: 16 BRPM | DIASTOLIC BLOOD PRESSURE: 69 MMHG | SYSTOLIC BLOOD PRESSURE: 122 MMHG | OXYGEN SATURATION: 91 %

## 2025-01-08 DIAGNOSIS — Z96.641 HISTORY OF TOTAL RIGHT HIP REPLACEMENT: Primary | ICD-10-CM

## 2025-01-08 DIAGNOSIS — M16.11 PRIMARY OSTEOARTHRITIS OF RIGHT HIP: ICD-10-CM

## 2025-01-08 PROBLEM — Z96.649 S/P TOTAL HIP ARTHROPLASTY: Status: ACTIVE | Noted: 2025-01-08

## 2025-01-08 LAB
GLUCOSE BLDC GLUCOMTR-MCNC: 123 MG/DL (ref 70–99)
GLUCOSE BLDC GLUCOMTR-MCNC: 229 MG/DL (ref 70–99)

## 2025-01-08 PROCEDURE — 250N000009 HC RX 250: Performed by: ORTHOPAEDIC SURGERY

## 2025-01-08 PROCEDURE — 258N000003 HC RX IP 258 OP 636: Performed by: NURSE ANESTHETIST, CERTIFIED REGISTERED

## 2025-01-08 PROCEDURE — 250N000025 HC SEVOFLURANE, PER MIN: Performed by: ORTHOPAEDIC SURGERY

## 2025-01-08 PROCEDURE — 97530 THERAPEUTIC ACTIVITIES: CPT | Mod: GP | Performed by: PHYSICAL THERAPIST

## 2025-01-08 PROCEDURE — 370N000017 HC ANESTHESIA TECHNICAL FEE, PER MIN: Performed by: ORTHOPAEDIC SURGERY

## 2025-01-08 PROCEDURE — 272N000001 HC OR GENERAL SUPPLY STERILE: Performed by: ORTHOPAEDIC SURGERY

## 2025-01-08 PROCEDURE — 999N000065 XR PELVIS PORT 1/2 VIEWS

## 2025-01-08 PROCEDURE — 710N000010 HC RECOVERY PHASE 1, LEVEL 2, PER MIN: Performed by: ORTHOPAEDIC SURGERY

## 2025-01-08 PROCEDURE — 250N000011 HC RX IP 250 OP 636: Performed by: ORTHOPAEDIC SURGERY

## 2025-01-08 PROCEDURE — 82962 GLUCOSE BLOOD TEST: CPT

## 2025-01-08 PROCEDURE — 250N000009 HC RX 250: Performed by: NURSE ANESTHETIST, CERTIFIED REGISTERED

## 2025-01-08 PROCEDURE — 999N000141 HC STATISTIC PRE-PROCEDURE NURSING ASSESSMENT: Performed by: ORTHOPAEDIC SURGERY

## 2025-01-08 PROCEDURE — C1713 ANCHOR/SCREW BN/BN,TIS/BN: HCPCS | Performed by: ORTHOPAEDIC SURGERY

## 2025-01-08 PROCEDURE — 250N000011 HC RX IP 250 OP 636: Performed by: NURSE ANESTHETIST, CERTIFIED REGISTERED

## 2025-01-08 PROCEDURE — 360N000084 HC SURGERY LEVEL 4 W/ FLUORO, PER MIN: Performed by: ORTHOPAEDIC SURGERY

## 2025-01-08 PROCEDURE — 258N000001 HC RX 258: Performed by: ORTHOPAEDIC SURGERY

## 2025-01-08 PROCEDURE — C1776 JOINT DEVICE (IMPLANTABLE): HCPCS | Performed by: ORTHOPAEDIC SURGERY

## 2025-01-08 PROCEDURE — 250N000013 HC RX MED GY IP 250 OP 250 PS 637: Performed by: ORTHOPAEDIC SURGERY

## 2025-01-08 PROCEDURE — 97110 THERAPEUTIC EXERCISES: CPT | Mod: GP | Performed by: PHYSICAL THERAPIST

## 2025-01-08 PROCEDURE — 97162 PT EVAL MOD COMPLEX 30 MIN: CPT | Mod: GP | Performed by: PHYSICAL THERAPIST

## 2025-01-08 PROCEDURE — 258N000003 HC RX IP 258 OP 636: Performed by: ORTHOPAEDIC SURGERY

## 2025-01-08 PROCEDURE — 999N000179 XR SURGERY CARM FLUORO LESS THAN 5 MIN W STILLS

## 2025-01-08 PROCEDURE — 97116 GAIT TRAINING THERAPY: CPT | Mod: GP | Performed by: PHYSICAL THERAPIST

## 2025-01-08 DEVICE — IMPLANTABLE DEVICE: Type: IMPLANTABLE DEVICE | Site: HIP | Status: FUNCTIONAL

## 2025-01-08 DEVICE — IMP SCR ZIM 6.5X25MM ACET CUP SELF TAP 00-6250-065-25: Type: IMPLANTABLE DEVICE | Site: HIP | Status: FUNCTIONAL

## 2025-01-08 DEVICE — IMP SHELL BIOM G7 ACETAB PPS LIM HOLE 54MM SZ F 010000664: Type: IMPLANTABLE DEVICE | Site: HIP | Status: FUNCTIONAL

## 2025-01-08 RX ORDER — HYDROMORPHONE HCL IN WATER/PF 6 MG/30 ML
0.2 PATIENT CONTROLLED ANALGESIA SYRINGE INTRAVENOUS EVERY 5 MIN PRN
Status: DISCONTINUED | OUTPATIENT
Start: 2025-01-08 | End: 2025-01-08 | Stop reason: HOSPADM

## 2025-01-08 RX ORDER — HYDROXYZINE HYDROCHLORIDE 25 MG/1
25 TABLET, FILM COATED ORAL EVERY 6 HOURS PRN
Status: DISCONTINUED | OUTPATIENT
Start: 2025-01-08 | End: 2025-01-08 | Stop reason: HOSPADM

## 2025-01-08 RX ORDER — NALOXONE HYDROCHLORIDE 0.4 MG/ML
0.1 INJECTION, SOLUTION INTRAMUSCULAR; INTRAVENOUS; SUBCUTANEOUS
Status: DISCONTINUED | OUTPATIENT
Start: 2025-01-08 | End: 2025-01-08 | Stop reason: HOSPADM

## 2025-01-08 RX ORDER — FENTANYL CITRATE 50 UG/ML
50 INJECTION, SOLUTION INTRAMUSCULAR; INTRAVENOUS EVERY 5 MIN PRN
Status: DISCONTINUED | OUTPATIENT
Start: 2025-01-08 | End: 2025-01-08 | Stop reason: HOSPADM

## 2025-01-08 RX ORDER — OXYCODONE HYDROCHLORIDE 5 MG/1
5-10 TABLET ORAL EVERY 4 HOURS PRN
Qty: 26 TABLET | Refills: 0 | Status: SHIPPED | OUTPATIENT
Start: 2025-01-08

## 2025-01-08 RX ORDER — NALOXONE HYDROCHLORIDE 0.4 MG/ML
0.2 INJECTION, SOLUTION INTRAMUSCULAR; INTRAVENOUS; SUBCUTANEOUS
Status: DISCONTINUED | OUTPATIENT
Start: 2025-01-08 | End: 2025-01-08 | Stop reason: HOSPADM

## 2025-01-08 RX ORDER — SODIUM CHLORIDE, SODIUM LACTATE, POTASSIUM CHLORIDE, CALCIUM CHLORIDE 600; 310; 30; 20 MG/100ML; MG/100ML; MG/100ML; MG/100ML
INJECTION, SOLUTION INTRAVENOUS CONTINUOUS
Status: DISCONTINUED | OUTPATIENT
Start: 2025-01-08 | End: 2025-01-08 | Stop reason: HOSPADM

## 2025-01-08 RX ORDER — ACETAMINOPHEN 325 MG/1
975 TABLET ORAL ONCE
Status: COMPLETED | OUTPATIENT
Start: 2025-01-08 | End: 2025-01-08

## 2025-01-08 RX ORDER — HYDROMORPHONE HCL IN WATER/PF 6 MG/30 ML
0.2 PATIENT CONTROLLED ANALGESIA SYRINGE INTRAVENOUS
Status: DISCONTINUED | OUTPATIENT
Start: 2025-01-08 | End: 2025-01-08 | Stop reason: HOSPADM

## 2025-01-08 RX ORDER — FENTANYL CITRATE-0.9 % NACL/PF 10 MCG/ML
PLASTIC BAG, INJECTION (ML) INTRAVENOUS PRN
Status: DISCONTINUED | OUTPATIENT
Start: 2025-01-08 | End: 2025-01-08

## 2025-01-08 RX ORDER — CELECOXIB 100 MG/1
400 CAPSULE ORAL ONCE
Status: COMPLETED | OUTPATIENT
Start: 2025-01-08 | End: 2025-01-08

## 2025-01-08 RX ORDER — GLYCOPYRROLATE 0.2 MG/ML
INJECTION, SOLUTION INTRAMUSCULAR; INTRAVENOUS PRN
Status: DISCONTINUED | OUTPATIENT
Start: 2025-01-08 | End: 2025-01-08

## 2025-01-08 RX ORDER — CEFAZOLIN SODIUM/WATER 2 G/20 ML
2 SYRINGE (ML) INTRAVENOUS SEE ADMIN INSTRUCTIONS
Status: DISCONTINUED | OUTPATIENT
Start: 2025-01-08 | End: 2025-01-08 | Stop reason: HOSPADM

## 2025-01-08 RX ORDER — BISACODYL 10 MG
10 SUPPOSITORY, RECTAL RECTAL DAILY PRN
Status: DISCONTINUED | OUTPATIENT
Start: 2025-01-08 | End: 2025-01-08 | Stop reason: HOSPADM

## 2025-01-08 RX ORDER — HYDROMORPHONE HCL IN WATER/PF 6 MG/30 ML
0.4 PATIENT CONTROLLED ANALGESIA SYRINGE INTRAVENOUS
Status: DISCONTINUED | OUTPATIENT
Start: 2025-01-08 | End: 2025-01-08 | Stop reason: HOSPADM

## 2025-01-08 RX ORDER — PROPOFOL 10 MG/ML
INJECTION, EMULSION INTRAVENOUS PRN
Status: DISCONTINUED | OUTPATIENT
Start: 2025-01-08 | End: 2025-01-08

## 2025-01-08 RX ORDER — ONDANSETRON 2 MG/ML
INJECTION INTRAMUSCULAR; INTRAVENOUS PRN
Status: DISCONTINUED | OUTPATIENT
Start: 2025-01-08 | End: 2025-01-08

## 2025-01-08 RX ORDER — NALOXONE HYDROCHLORIDE 0.4 MG/ML
0.4 INJECTION, SOLUTION INTRAMUSCULAR; INTRAVENOUS; SUBCUTANEOUS
Status: DISCONTINUED | OUTPATIENT
Start: 2025-01-08 | End: 2025-01-08 | Stop reason: HOSPADM

## 2025-01-08 RX ORDER — TRANEXAMIC ACID 650 MG/1
1950 TABLET ORAL ONCE
Status: COMPLETED | OUTPATIENT
Start: 2025-01-08 | End: 2025-01-08

## 2025-01-08 RX ORDER — FENTANYL CITRATE 50 UG/ML
25 INJECTION, SOLUTION INTRAMUSCULAR; INTRAVENOUS EVERY 5 MIN PRN
Status: DISCONTINUED | OUTPATIENT
Start: 2025-01-08 | End: 2025-01-08 | Stop reason: HOSPADM

## 2025-01-08 RX ORDER — ASPIRIN 325 MG
325 TABLET, DELAYED RELEASE (ENTERIC COATED) ORAL 2 TIMES DAILY
Qty: 80 TABLET | Refills: 0 | Status: SHIPPED | OUTPATIENT
Start: 2025-01-08 | End: 2025-02-17

## 2025-01-08 RX ORDER — LIDOCAINE 40 MG/G
CREAM TOPICAL
Status: DISCONTINUED | OUTPATIENT
Start: 2025-01-08 | End: 2025-01-08 | Stop reason: HOSPADM

## 2025-01-08 RX ORDER — VANCOMYCIN HYDROCHLORIDE 1 G/20ML
INJECTION, POWDER, LYOPHILIZED, FOR SOLUTION INTRAVENOUS PRN
Status: DISCONTINUED | OUTPATIENT
Start: 2025-01-08 | End: 2025-01-08 | Stop reason: HOSPADM

## 2025-01-08 RX ORDER — LIDOCAINE HYDROCHLORIDE 10 MG/ML
INJECTION, SOLUTION INFILTRATION; PERINEURAL PRN
Status: DISCONTINUED | OUTPATIENT
Start: 2025-01-08 | End: 2025-01-08

## 2025-01-08 RX ORDER — ACETAMINOPHEN 325 MG/1
975 TABLET ORAL EVERY 8 HOURS PRN
Qty: 100 TABLET | Refills: 0 | Status: SHIPPED | OUTPATIENT
Start: 2025-01-08

## 2025-01-08 RX ORDER — HYDROMORPHONE HCL IN WATER/PF 6 MG/30 ML
0.4 PATIENT CONTROLLED ANALGESIA SYRINGE INTRAVENOUS EVERY 5 MIN PRN
Status: DISCONTINUED | OUTPATIENT
Start: 2025-01-08 | End: 2025-01-08 | Stop reason: HOSPADM

## 2025-01-08 RX ORDER — ONDANSETRON 4 MG/1
4 TABLET, ORALLY DISINTEGRATING ORAL EVERY 30 MIN PRN
Status: DISCONTINUED | OUTPATIENT
Start: 2025-01-08 | End: 2025-01-08 | Stop reason: HOSPADM

## 2025-01-08 RX ORDER — ASPIRIN 325 MG
325 TABLET, DELAYED RELEASE (ENTERIC COATED) ORAL 2 TIMES DAILY WITH MEALS
Status: DISCONTINUED | OUTPATIENT
Start: 2025-01-08 | End: 2025-01-08 | Stop reason: HOSPADM

## 2025-01-08 RX ORDER — KETAMINE HYDROCHLORIDE 10 MG/ML
INJECTION INTRAMUSCULAR; INTRAVENOUS PRN
Status: DISCONTINUED | OUTPATIENT
Start: 2025-01-08 | End: 2025-01-08

## 2025-01-08 RX ORDER — ONDANSETRON 2 MG/ML
4 INJECTION INTRAMUSCULAR; INTRAVENOUS EVERY 6 HOURS PRN
Status: DISCONTINUED | OUTPATIENT
Start: 2025-01-08 | End: 2025-01-08 | Stop reason: HOSPADM

## 2025-01-08 RX ORDER — FENTANYL CITRATE 50 UG/ML
INJECTION, SOLUTION INTRAMUSCULAR; INTRAVENOUS PRN
Status: DISCONTINUED | OUTPATIENT
Start: 2025-01-08 | End: 2025-01-08

## 2025-01-08 RX ORDER — OXYCODONE HYDROCHLORIDE 5 MG/1
5 TABLET ORAL EVERY 4 HOURS PRN
Status: DISCONTINUED | OUTPATIENT
Start: 2025-01-08 | End: 2025-01-08 | Stop reason: HOSPADM

## 2025-01-08 RX ORDER — POLYETHYLENE GLYCOL 3350 17 G/17G
17 POWDER, FOR SOLUTION ORAL DAILY
Status: DISCONTINUED | OUTPATIENT
Start: 2025-01-09 | End: 2025-01-08 | Stop reason: HOSPADM

## 2025-01-08 RX ORDER — ACETAMINOPHEN 325 MG/1
975 TABLET ORAL EVERY 8 HOURS
Status: DISCONTINUED | OUTPATIENT
Start: 2025-01-08 | End: 2025-01-08 | Stop reason: HOSPADM

## 2025-01-08 RX ORDER — ONDANSETRON 2 MG/ML
4 INJECTION INTRAMUSCULAR; INTRAVENOUS EVERY 30 MIN PRN
Status: DISCONTINUED | OUTPATIENT
Start: 2025-01-08 | End: 2025-01-08 | Stop reason: HOSPADM

## 2025-01-08 RX ORDER — DEXAMETHASONE SODIUM PHOSPHATE 10 MG/ML
INJECTION, SOLUTION INTRAMUSCULAR; INTRAVENOUS PRN
Status: DISCONTINUED | OUTPATIENT
Start: 2025-01-08 | End: 2025-01-08

## 2025-01-08 RX ORDER — CEFAZOLIN SODIUM/WATER 2 G/20 ML
2 SYRINGE (ML) INTRAVENOUS EVERY 8 HOURS
Status: DISCONTINUED | OUTPATIENT
Start: 2025-01-08 | End: 2025-01-08 | Stop reason: HOSPADM

## 2025-01-08 RX ORDER — AMOXICILLIN 250 MG
1 CAPSULE ORAL 2 TIMES DAILY
Status: DISCONTINUED | OUTPATIENT
Start: 2025-01-08 | End: 2025-01-08 | Stop reason: HOSPADM

## 2025-01-08 RX ORDER — OXYCODONE HCL 10 MG/1
10 TABLET, FILM COATED, EXTENDED RELEASE ORAL EVERY 8 HOURS
Status: COMPLETED | OUTPATIENT
Start: 2025-01-08 | End: 2025-01-08

## 2025-01-08 RX ORDER — ONDANSETRON 4 MG/1
4 TABLET, ORALLY DISINTEGRATING ORAL EVERY 6 HOURS PRN
Status: DISCONTINUED | OUTPATIENT
Start: 2025-01-08 | End: 2025-01-08 | Stop reason: HOSPADM

## 2025-01-08 RX ORDER — PROCHLORPERAZINE MALEATE 5 MG/1
10 TABLET ORAL EVERY 6 HOURS PRN
Status: DISCONTINUED | OUTPATIENT
Start: 2025-01-08 | End: 2025-01-08 | Stop reason: HOSPADM

## 2025-01-08 RX ORDER — DEXAMETHASONE SODIUM PHOSPHATE 10 MG/ML
4 INJECTION, SOLUTION INTRAMUSCULAR; INTRAVENOUS
Status: DISCONTINUED | OUTPATIENT
Start: 2025-01-08 | End: 2025-01-08 | Stop reason: HOSPADM

## 2025-01-08 RX ORDER — CEFAZOLIN SODIUM/WATER 2 G/20 ML
2 SYRINGE (ML) INTRAVENOUS
Status: COMPLETED | OUTPATIENT
Start: 2025-01-08 | End: 2025-01-08

## 2025-01-08 RX ORDER — OXYCODONE HYDROCHLORIDE 5 MG/1
10 TABLET ORAL EVERY 4 HOURS PRN
Status: DISCONTINUED | OUTPATIENT
Start: 2025-01-08 | End: 2025-01-08 | Stop reason: HOSPADM

## 2025-01-08 RX ORDER — AMOXICILLIN 250 MG
1-2 CAPSULE ORAL 2 TIMES DAILY
Qty: 20 TABLET | Refills: 0 | Status: SHIPPED | OUTPATIENT
Start: 2025-01-08

## 2025-01-08 RX ADMIN — ROCURONIUM BROMIDE 20 MG: 50 INJECTION, SOLUTION INTRAVENOUS at 08:31

## 2025-01-08 RX ADMIN — LIDOCAINE HYDROCHLORIDE 50 MG: 10 INJECTION, SOLUTION INFILTRATION; PERINEURAL at 07:34

## 2025-01-08 RX ADMIN — ASPIRIN 325 MG: 325 TABLET ORAL at 17:55

## 2025-01-08 RX ADMIN — FENTANYL CITRATE 50 MCG: 50 INJECTION INTRAMUSCULAR; INTRAVENOUS at 07:31

## 2025-01-08 RX ADMIN — Medication 2 G: at 07:26

## 2025-01-08 RX ADMIN — SODIUM CHLORIDE, POTASSIUM CHLORIDE, SODIUM LACTATE AND CALCIUM CHLORIDE: 600; 310; 30; 20 INJECTION, SOLUTION INTRAVENOUS at 06:56

## 2025-01-08 RX ADMIN — PROPOFOL 50 MG: 10 INJECTION, EMULSION INTRAVENOUS at 08:12

## 2025-01-08 RX ADMIN — Medication 100 MCG: at 10:39

## 2025-01-08 RX ADMIN — Medication 100 MCG: at 10:36

## 2025-01-08 RX ADMIN — SODIUM CHLORIDE, POTASSIUM CHLORIDE, SODIUM LACTATE AND CALCIUM CHLORIDE: 600; 310; 30; 20 INJECTION, SOLUTION INTRAVENOUS at 10:18

## 2025-01-08 RX ADMIN — ACETAMINOPHEN 975 MG: 325 TABLET, FILM COATED ORAL at 13:26

## 2025-01-08 RX ADMIN — MIDAZOLAM 2 MG: 1 INJECTION INTRAMUSCULAR; INTRAVENOUS at 07:26

## 2025-01-08 RX ADMIN — SODIUM CHLORIDE, POTASSIUM CHLORIDE, SODIUM LACTATE AND CALCIUM CHLORIDE: 600; 310; 30; 20 INJECTION, SOLUTION INTRAVENOUS at 12:46

## 2025-01-08 RX ADMIN — ACETAMINOPHEN 975 MG: 325 TABLET, FILM COATED ORAL at 06:25

## 2025-01-08 RX ADMIN — Medication 200 MCG: at 10:45

## 2025-01-08 RX ADMIN — GLYCOPYRROLATE 0.2 MG: 0.2 INJECTION, SOLUTION INTRAMUSCULAR; INTRAVENOUS at 08:15

## 2025-01-08 RX ADMIN — TRANEXAMIC ACID 1950 MG: 650 TABLET ORAL at 06:25

## 2025-01-08 RX ADMIN — PROPOFOL 200 MG: 10 INJECTION, EMULSION INTRAVENOUS at 07:34

## 2025-01-08 RX ADMIN — HYDROMORPHONE HYDROCHLORIDE 0.25 MG: 1 INJECTION, SOLUTION INTRAMUSCULAR; INTRAVENOUS; SUBCUTANEOUS at 09:13

## 2025-01-08 RX ADMIN — HYDROMORPHONE HYDROCHLORIDE 0.25 MG: 1 INJECTION, SOLUTION INTRAMUSCULAR; INTRAVENOUS; SUBCUTANEOUS at 09:56

## 2025-01-08 RX ADMIN — HYDROMORPHONE HYDROCHLORIDE 0.5 MG: 1 INJECTION, SOLUTION INTRAMUSCULAR; INTRAVENOUS; SUBCUTANEOUS at 08:40

## 2025-01-08 RX ADMIN — HYDROMORPHONE HYDROCHLORIDE 0.25 MG: 1 INJECTION, SOLUTION INTRAMUSCULAR; INTRAVENOUS; SUBCUTANEOUS at 11:07

## 2025-01-08 RX ADMIN — OXYCODONE HYDROCHLORIDE 10 MG: 10 TABLET, FILM COATED, EXTENDED RELEASE ORAL at 06:25

## 2025-01-08 RX ADMIN — Medication 200 MCG: at 10:54

## 2025-01-08 RX ADMIN — ROCURONIUM BROMIDE 30 MG: 50 INJECTION, SOLUTION INTRAVENOUS at 09:13

## 2025-01-08 RX ADMIN — CELECOXIB 400 MG: 100 CAPSULE ORAL at 06:25

## 2025-01-08 RX ADMIN — DEXAMETHASONE SODIUM PHOSPHATE 5 MG: 10 INJECTION, SOLUTION INTRAMUSCULAR; INTRAVENOUS at 07:56

## 2025-01-08 RX ADMIN — Medication 100 MCG: at 08:54

## 2025-01-08 RX ADMIN — Medication 30 MG: at 08:05

## 2025-01-08 RX ADMIN — Medication 200 MG: at 11:08

## 2025-01-08 RX ADMIN — CEFAZOLIN 2 G: 10 INJECTION, POWDER, FOR SOLUTION INTRAVENOUS at 13:27

## 2025-01-08 RX ADMIN — ROCURONIUM BROMIDE 50 MG: 50 INJECTION, SOLUTION INTRAVENOUS at 07:35

## 2025-01-08 RX ADMIN — PROPOFOL 50 MG: 10 INJECTION, EMULSION INTRAVENOUS at 09:06

## 2025-01-08 RX ADMIN — Medication 10 MG: at 08:31

## 2025-01-08 RX ADMIN — HYDROMORPHONE HYDROCHLORIDE 0.25 MG: 1 INJECTION, SOLUTION INTRAMUSCULAR; INTRAVENOUS; SUBCUTANEOUS at 09:32

## 2025-01-08 RX ADMIN — FENTANYL CITRATE 50 MCG: 50 INJECTION INTRAMUSCULAR; INTRAVENOUS at 07:56

## 2025-01-08 RX ADMIN — ROCURONIUM BROMIDE 30 MG: 50 INJECTION, SOLUTION INTRAVENOUS at 10:17

## 2025-01-08 RX ADMIN — Medication 10 MG: at 09:06

## 2025-01-08 RX ADMIN — SODIUM CHLORIDE, POTASSIUM CHLORIDE, SODIUM LACTATE AND CALCIUM CHLORIDE: 600; 310; 30; 20 INJECTION, SOLUTION INTRAVENOUS at 13:59

## 2025-01-08 RX ADMIN — ONDANSETRON 4 MG: 2 INJECTION INTRAMUSCULAR; INTRAVENOUS at 08:08

## 2025-01-08 ASSESSMENT — ACTIVITIES OF DAILY LIVING (ADL)
ADLS_ACUITY_SCORE: 21
ADLS_ACUITY_SCORE: 23
ADLS_ACUITY_SCORE: 18
ADLS_ACUITY_SCORE: 23
ADLS_ACUITY_SCORE: 18
ADLS_ACUITY_SCORE: 18
ADLS_ACUITY_SCORE: 21
ADLS_ACUITY_SCORE: 18
ADLS_ACUITY_SCORE: 23
ADLS_ACUITY_SCORE: 18
ADLS_ACUITY_SCORE: 23
ADLS_ACUITY_SCORE: 23
ADLS_ACUITY_SCORE: 18

## 2025-01-08 NOTE — PROGRESS NOTES
S-(situation): Patient discharged to home via wheelchair with wife    B-(background): right hip surgery    A-(assessment): vss, denies pain, RA    R-(recommendations): Discharge instructions reviewed with patient. Listed belongings gathered and returned to patient.          Discharge Nursing Criteria:   Patient Education given and documented: (STROKE, CHF, Diabetes):  { NA    Care Plan and Patient education resolved: Yes    New Medications- pt has been educated about purpose and side effects: Yes    Vaccines  Influenza status verified at discharge:  Yes    MISC  Prescriptions if needed, hard copies sent with patient  Yes  Home medications returned to patient: NA  Medication Bin checked and emptied on discharge Yes  Patient reports post-discharge pain management plan is effective: Yes

## 2025-01-08 NOTE — OP NOTE
DATE OF SERVICE: 1/8/2025     SURGEON: BIJAN CLIFTON MD     ASSISTANT:  Praveen Diaz PA-C     Assistance of Praveen Diaz PA-C was medically necessary given the technical complexity of the case; with assistance with patient positioning, retraction and hip joint exposure, limb manipulation, assistance with implant placement, trial and final arthroplasty hip implant reduction, and wound closure.     PREOPERATIVE DIAGNOSIS:  right hip primary osteoarthritis.     POSTOPERATIVE DIAGNOSIS:  right hip primary osteoarthritis.     PROCEDURE:  right total hip arthroplasty. Direct anterior approach     COMPONENTS IMPLANTED:         Elizabeth G7 cup size 54.     Biomet Taperloc stem size 10      Liner Neutral E-poly     Head ceramic 36 mms with -3 mms neck length.      ANESTHESIA:General ; in the supine position on the [facility] surgical table.    ESTIMATED BLOOD LOSS:  500 ml.    ANTIBIOTICS:  cefazolin prior to incision.    Tranexamic Acid: 1950 mg oral 90 minutes preop    DRAINS:  None.    COMPLICATIONS:  None.     SPECIMENS: none      FINDINGS: advanced hip osteoarthritis, eburnated femoral head with marginal osteophytes. Acetabulum with impinging marginal osteophytes.    INDICATIONS FOR PROCEDURE: Da Akins is a pleasant 59 year old male who has had ongoing significant pain in the right hip. Xrays with advanced hip degenerative arthrosis with loss of joint space and marginal osteophytes. The risk/benefit analysis of the above right total hip arthroplasty was explained. Alternate bearing technology and the additional risks of a press fit hip were explained, including fracture. Risks discussed to include, but not be limited to: wear, loosening, infection, bleeding, pain, scar, thromboembolic disease, neurovascular damage with temporary or permanent nerve damage, limb length inequality, implant failure, implant dislocation, gisel-prosthetic fracture, need for further surgery, risks of anesthesia and death.  Despite these  risks, the patient elected to proceed and, with the patient's permission, was taken to the operating room.       DESCRIPTION OF PROCEDURE:  The patient was identified in the preoperative holding area.  After confirming with the patient the correct procedure and procedure site, the right hip was marked with an indelible marker by myself, the attending surgeon.  After again reviewing the risks and perceived benefits of surgery, questions were addressed, he elected to proceed and written informed consent was obtained and reviewed.     The patient was then taken to the operating room and placed supine on the operating surgical [place] table.  After adequate anesthesia, a Perea catheter was placed.  The lower extremities were placed in the boots and suspended.  The arms were well positioned and padded to be comfortable.  The right lower extremity was then prepped and draped in the usual sterile fashion.     A timeout was then performed confirming the correct patient, procedure, procedure site, availability of instruments and implants, the administration of prophylactic antibiotics as well as a review of the patient's allergies by all surgical staff.    An anterior based incision was made approximately two fingerbreadths lateral (3cm) and two fingerbreadths distal to the ASIS in an oblique fashion extending posterior-distally towards the anterior aspect of the femur approximately 10-12 cm in length, sharply through skin.  We then used electrocautery through the subcutaneous tissues, performing hemostasis.  Perforating vessels were cauterized. The fascia over the tensor was then incised longitudinally.  I then proceeded to dissect the tensor muscle off the  undersurface of the fascia anteriorly and medially, exposing the underlying tissues and capsule as well as the rectus femoris medially.  A retractor was placed laterally over the neck as well as inferiorly over the femoral neck and anteriorly over the acetabulum rim deep  "to the rectus.  We then proceeded to use a Tata retractor laterally and a vascular bundle of the circumflex vessels was identified.  Using the Aquamantys, these vessels were then cauterized.  Good exposure of the anterior aspect  of the femoral neck capsule was obtained.  I then proceeded to make a capsulotomy an inverted \"T\" fashion, starting at the superolateral aspect of the acetabulum and femoral head, along the superior aspect of the femoral neck down to the intertrochanteric ridge and then distally in an oblique fashion along the intertrochanteric ridge towards the lesser trochanter.  This anterior capsule was then tagged. I also tagged the superolateral capsule at the saddle of the trochanter and gently released this as well.  This exposed the underlying femoral head and neck. There were prominent marginal osteophytes. The retractors were then placed intracapsular.  Further  capsular releases were performed inferiorly along the medial calcar to get to the base of the lesser trochanter as well as laterally into the trochanteric saddle.     I then used a C-arm to get positioning with making the femoral neck cut.  After determining adequate cut to the templated length, a first cut was made at the intended final cut.  Care was taken to avoid the posterior hanging greater trochanter.   Using a corkscrew into the femoral head, using a twisting motion,  the femoral head was then removed in line with the fibers of the tensor muscle with care taken to protect the tensor.  The wound was then irrigated.  I then proceeded to place a retractor anteriorly over the anterior/inferior acetabulum, inferiorly over the transverse acetabular ligament as well as posteriorly within the capsule, giving good exposure to the acetabulum.  I then proceeded using either a long-handled knife or electrocautery to remove labral tissue as well as the  pulvinar at the acetabular floor. Overhanging osteophytes were removed.  There was " significant medial acetabular osteophyte.  Once the soft tissue was cleared out, I then proceeded to ream.  We were using the Scurri system.  Using a 47 mm reamer and using a C-arm, the acetabulum was medialized to the teardrop, removing the medial osteophyte.  I then sequentially increased reamer sizes by 2 mm, up to a 53, maintaining correct abduction and version, which was confirmed with the C-arm.  We trialed a size 53 cup.  This  was a very good and tight fit.  I then proceeded to place the 54 mms G7 acetabular shell using the C-arm to guide the abduction angle as well as anteversion, which was approximately 20-25 degrees.  1 screw was added.  Once this was confirmed, the final neutral E-poly acetabular polyethylene liner was placed.  Spurs were removed from anterior and inferior socket.The wound was irrigated.     I then proceeded to the femur.  A hook retractor was placed along the lateral aspect of the trochanter just distal to the vastus tubercle. Using my hand, I pulled the femur laterally taking care that it was not caught under the acetabulum. We then proceeded to lower the limb down to the floor and slightly adduct, externally rotating the foot to 120 degrees (femur about 90 degrees), giving good femoral exposure.  A retractor was placed medially along the calcar.  I then proceeded to release the lateral capsule along the inner aspect of the greater trochanter, taking care of  the underlying rotators; these tendons were exposed and kept intact.  This gave better exposure of the femur. A shepards hook was placed over the tip of the greater trochanter.  I then proceeded to connect the femoral hook to the lift and using my hand to lift the femur, proceeded to elevate the lift until there was firm pressure against my hand.  This gave us adequate exposure of the femur for broaching.  A cookie cutter was placed laterally to remove some of the remnant of the lateral neck as well as a  rongeur.  A canal finding  awl was placed down the femur.  I then proceeded to broach for the Taperloc, first starting with the chili pepper broach followed by sequential broaching up to the 9 size.  This provided a good fit.  This was calcar planed.  The wound was irrigated.  I then proceeded to do a trial reduction,  Starting off with a 0 femoral head, releasing the femoral hook and removing the retractors and bringing the leg back up to a neutral position,  the hip was easily reduced.  Using C-arm, we assess length using the overlay technique with printed images.  It looked like the leg was a bit long and the stem could be bigger.  I advanced to the 10 stem broach and the -3 neck length.  Once the final length was determined trialing various head sizes using the overlay technique, we proceed to dislocate the trial and proceed with final femoral stem placement. The 10 Taperloc femoral stem was then placed.  This provided good fixation on the femoral component.  I then proceeded to trial once again the previous trialed femoral head and this was reduced, with final images showing good length. It appeared to be a good fit and stable.  This trial was then dislocated and the  final 36 mms ceramic head with - 3 neck length was placed.  The hip was reduced.  Joint block was injected.  The wound was then copiously irrigated starting with a liter of dilute Betadine followed by three liters of antibiotic normal saline.  The  capsule was repaired side-to-side with #2 Ethibond and #2 vicryl.  One gram of vancomycin powder was placed deep in the wound.  The fascia over the tensor was closed side-to-side with #0 Vicryl.  The subcutaneous and dermal tissues were then approximated with 2-0 Vicryl and 3-0 Monocryl.  3-0 Monocryl subcuticular suture was used to maintain good skin eversion and apposition with Dermabond to seal the skin edges.  A sterile waterproof  dressing was applied over the incision.  The patient was then awakened and extubated and taken  to the recovery room without difficulty in stable condition.  There were no apparent complications.       A postoperative pelvis x-ray will be obtained in recovery.      The postoperative plan will be to weight bear as tolerated.  No hip precautions.  Twenty-three hours of IV antibiotics.  Anticoagulation will proceed for 6 weeks postoperative and will include twice daily 325 mg aspirin.     There were no apparent complications.    Dr. Ronald Vieyra

## 2025-01-08 NOTE — DISCHARGE INSTRUCTIONS
Total Hip Replacement, Direct Anterior Approach by Dr. Vieyra   Discharge Instructions    [phone]  Bone and Joint Service Line for issues or concerns    General Care:  After surgery you may feel tired/sleepy. This is normal. If you have any question along the way please contact the office. If you feel it is an issue cannot wait for normal office hours, contact the on-call physician. You should not drive or operate machines/equipment until released by your physician to do so.     Bandages:   It s normal to have some blood-tinged fluid on your bandages, this may occur for a few days after being sent home from the hospital. This is a water resistant dressing so you may shower over this. Leave the dressing placed until follow up.  If you would like, you can also take it off on the 10th day, and then do daily dressing changes with gauze and tape. You may also notice a few drops of blood from your incision as you begin to move more this is normal. Keep the area clean and dry.      Bathing:  Do not submerge your incision in water such as a bath or pool. It is ok to shower when you get home over the aquacell water resistant bandage.  You may find in comfortable to get a shower chair for the first month while you continue to heal and get stronger.     Follow up:  Your follow up appointment should already be scheduled. If it s not, please call the office to verify an appointment 10-14 days after surgery.    Diet:  You may not have a full appetite at discharge this should get better. Progress to bland foods such as crackers and bread and finally to your normal diet if you have no problems.  Avoid alcohol when taking narcotic pain medications.      Pain control:  Take your pain medications as prescribed. These medications may make you sleepy. Do not drive, operate equipment, or drink alcohol when taking these.  You may take Tylenol (Generic name is acetaminophen) as directed on the bottle for additional relief or in  place of the prescribed pain medications as your pain gets better.  If the medications cause a reaction such as nausea or skin rash, stop taking them and contact your doctor. Please plan accordingly, pain medications will not be re-filled on the weekends or at night. Call the office during the day if you need more medications.    Blood thinner:  It is very important to take the Aspirin 325 mg twice a day to help prevent blood clots. No medication is perfect, so if you notice a sudden onset of pain/swelling in your calf area call your doctor. If you notice a sudden onset of troubles breathing and/or chest pain call 911.     Icing:  It is common for some swelling, aching and stiffness to occur for awhile after a hip replacement. Apply ice for 20 minutes at a time. For the first 1-2 weeks apply ice 2-3 x a day or more after therapy/exercises.    Walker/crutches:  Use a walker/crutches when you go home. You will transition to the use of a cane and finally to no additional support.     Physical Therapy:  The success of your hip replacement is based on doing physical therapy. You will have some pain and discomfort along the way. If you feel your pain is limiting your progress make sure to take some pain medication prior to your therapy session. If your pain medications are not working talk to your surgeon.   For the first 1-2 weeks after going home you will have in-home physical therapy. The goal is to work on walking and feeling steady.  Usually after your first post-operative follow up you will move to out-patient physical therapy.   If you are going to transitional care, they will work on physical therapy there then you will have home physical therapy when discharged to home, then outpatient physical therapy after a few weeks as explained above.     Activity:  Unless otherwise instructed, you can weight bear as tolerated with a walker/cane.   For 6 weeks follow these listed precautions:   Do not cross the midline of  your body with the operated leg.    Normal findings after surgery:  Numbness and tenderness around the incisions.   You may have bruising around the incisions and down the thigh.   Low grade fevers less than 100.5 degrees Fahrenheit are normal.   You will have some increased pain after your therapy sessions.     When to call the Office:  Temperature greater than 101.5 degrees Fahreheit.  Fever, chills, and increasing pain in the hip.  Excessive drainage from the incisions that include bright red blood.  Drainage from the incisions sites that appear yellow, pus-like, or foul smelling.  Increasing pain the hip not relieved by the prescribed pain medications or ice.  Persistent nausea or vomiting not helped by the Zofran.  Increased pain or swelling in your calf area (in back above your ankle) that wasn t there when in the hospital.  Any other effects you feel are significant.  Call 911 if you experience any chest pain and/or shortness or breath.

## 2025-01-08 NOTE — PROGRESS NOTES
"ORTHO PROGRESS NOTE    POD # 0  Procedure(s):  ARTHROPLASTY, RIGHT HIP, TOTAL, DIRECT ANTERIOR APPROACH - on 2025    Pain:  mild    /69 (BP Location: Left arm, Cuff Size: Adult Regular)   Pulse 73   Temp 97.9  F (36.6  C) (Oral)   Resp 16   Ht 1.676 m (5' 6\")   Wt 90.2 kg (198 lb 13.7 oz)   SpO2 91%   BMI 32.10 kg/m      Temp (24hrs), Av.6  F (37  C), Min:97.6  F (36.4  C), Max:99.3  F (37.4  C)      Recent Labs   Lab Test 24  0922   HGB 13.5                 Circulation intact.   Sensation intact.   Calves soft and nontender.   Incision is covered      PT  walked 180 feet.  Did stairs         ASSESSMENT:  right total hip arthroplasty doing well.    PLAN:  He will be free to discharge when he is able to void.  Discharge today.  Aspirin: Take 1 tablet (325 mg) by mouth twice daily for blood thinning for 6 weeks.   Tylenol 1000 mg three times daily for pain.  May stop when you don't have pain.  Oxycodone as needed for severe pain.  No flexion limit.  Do not cross your knees.  Use pillow between knees if sleeping on your side.  Weight bearing as tolerated.  Use walker as needed.  Return to clinic 10-14 days.     Ronald Vieyra M.D.  Department of Orthopaedic Surgery  [facility]  Pager: [phone]      "

## 2025-01-08 NOTE — BRIEF OP NOTE
[facility]    Brief Operative Note    Pre-operative diagnosis: Primary osteoarthritis of right hip [M16.11]  Post-operative diagnosis Same as pre-operative diagnosis    Procedure: ARTHROPLASTY, RIGHT HIP, TOTAL, DIRECT ANTERIOR APPROACH, Right - Hip    Surgeon: Surgeons and Role:     * Ronald Vieyra MD - Primary     * Praveen Diaz PA-C - Assisting  Anesthesia: General with Block   Estimated Blood Loss: 500 mL from 1/8/2025  7:29 AM to 1/8/2025 11:22 AM      Drains: None  Specimens:   ID Type Source Tests Collected by Time Destination   A : RIGHT Hip Bone Fragments. Bone Fragments Hip, Right OR DOCUMENTATION ONLY Ronald Vieyra MD 1/8/2025 10:27 AM      Findings:   Right hip primary osteoarthritis .  Complications: None .  Implants:   Implant Name Type Inv. Item Serial No.  Lot No. LRB No. Used Action   IMP SHELL BIOM G7 ACETAB PPS GODFREY HOLE 54MM SZ F [id number] - [id number] Total Joint Component/Insert IMP SHELL BIOM G7 ACETAB PPS GODFREY HOLE 54MM SZ  [id number]  [employer] [id number] Right 1 Implanted   IMP SCR ZIM 6.5X25MM ACET CUP SELF TAP -992-25 - [id number] Metallic Hardware/[place] IMP SCR ZIM 6.5X25MM ACET CUP SELF TAP -750-25  [employer] [id number] Right 1 Implanted   LINER ACTB G7 F 36MM VIVACIT-E HIP STRL LUM LF [id number] - [id number] Total Joint Component/Insert LINER ACTB G7 F 36MM VIVACIT-E HIP STRL LUM LF [id number]  [employer] [id number] Right 1 Implanted   IMP STEM FEM BIOM TAPERLOC STD OFFSET TYPE 1 SZ10 [id number] - [id number] Total Joint Component/Insert IMP STEM FEM BIOM TAPERLOC STD OFFSET TYPE 1 SZ10 [id number]  [employer] [id number] Right 1 Implanted   HEAD FEM -3MM OFST 36MM HIP ACTB MDLR TY 1 BLX D [id number] - [id number] Total Joint Component/Insert HEAD FEM -3MM OFST 36MM HIP ACTB MDLR TY 1 BLX D [id number]  [employer] [id number] Right 1 Implanted

## 2025-01-08 NOTE — PROGRESS NOTES
Transfer from  PACU to Room 274    Transferred to m/s via cart (Glyder Mat,Transfer Boar,Slider Sheet)    S: 60 y/o male  S/P right ANAI       Anesthesia Type:  general       Surgeon:  Dr. Vieyra       Allergies:  See Medication Reconciliation Record       DNR: No  (Yes,No)    B:  Pertinent Medical History:   Past Medical History:   Diagnosis Date    Arthritis 2017    Arthritis in right hip    Healthy adult     Hypertension 2005    Take prescription    Seasonal allergies     Type 2 diabetes mellitus with hyperglycemia, without long-term current use of insulin (H) 7/29/2023       (CHF; Heart Disease; Lung Disease; Chronic Pain; Diabetes; Other (Comment)          Surgical History:    Past Surgical History:   Procedure Laterality Date    ANKLE SURGERY      COLONOSCOPY N/A 1/23/2023    Procedure: COLONOSCOPY;  Surgeon: Jose Montanez MD;  Location: [facility]    gnaglion cyst remove Right     REMOVE FOREIGN BODY FOOT Right 12/13/2024    Procedure: Excision foreign body right foot;  Surgeon: Stanton Resendiz DPM;  Location: [facility] OR         A:  EBL: 500ml        IVF:  1500ml in OR, 100ml in PACU        UOP:  due to void.  Bladder scan 88ml        NPO:  ___Yes _X_No         Vomiting:  ___Yes _X_No         Drainage: none on aquacel        Skin Integrity: surgical incision right hip (Normal; Pressure Ulcer (Location)        RFO: ___Yes_X_No (identify item if present)        SSI Patient?  _X_Yes___No (if yes, see checklist for actions)        Brace/sling/equipment:  ___Yes_X_No (identify item if present)         See PACU record for ongoing assessment, vital signs and pain assessment.    R: Post-Op vitals and assessments as ordered/indicated per patient's condition.       Follow Post-Op orders and notify Physician prn.       Continue to involve patient/family in plan of care and discharge planning.       Reinforce Pre-Operative education.       Implement skin safety interventions as appropriate.    Name:   Maria Fernanda Will  RN

## 2025-01-08 NOTE — PROGRESS NOTES
"[facility]    Post Op Check Note    59 year old male POD#0 s/p left total hip arthroplasty by Dr. Vieyra  S: Patient seen at bedside in no apparent distress, alert and pleasant by Dr. Vieyra, ice also stopped by but he was trying to urinate at the time.  Dr. Vieyra discussed discharge with the patient.  O: No exam done at this time.    Vital signs:  Temp: 97.9  F (36.6  C) Temp src: Oral BP: 122/69 Pulse: 73   Resp: 16 SpO2: 91 % O2 Device: None (Room air) Oxygen Delivery: 1 LPM Height: 167.6 cm (5' 6\") Weight: 90.2 kg (198 lb 13.7 oz)  Estimated body mass index is 32.1 kg/m  as calculated from the following:    Height as of this encounter: 1.676 m (5' 6\").    Weight as of this encounter: 90.2 kg (198 lb 13.7 oz).      Hemoglobin   Date Value Ref Range Status   12/09/2024 13.5 13.3 - 17.7 g/dL Final   11/30/2015 15.0 13.3 - 17.7 g/dL Final     No results found for: \"INR\"      A/P:  1. Pain-controlled   2. DVT Prophylaxis-Asprin 325mg BID x 6 weeks   3. Weight Bearing Status-WBAT LLE  4. Dispo-discharge to home with wife.    5. Plan-discharge tonight after urination.  Outpatient physical therapy set up for 1/13/2025 and follow-up with Dr. Vieyra 1/23/2025.    Praveen Diaz PA-C  1/8/2025         "

## 2025-01-08 NOTE — PROGRESS NOTES
S-(situation): Patient arrives to room 274 via cart from PACU    B-(background): right hip surgery    A-(assessment): vss, denies pain, 2L o2 NC    R-(recommendations): Orders reviewed with patient. Will monitor patient per MD orders.     Inpatient nursing criteria listed below were met:    Health care directives status obtained and documented: Yes  VTE ordered/documented: Yes  Skin issues/needs documented:Yes  Isolation addressed and Signage used: Yes  Fall Prevention: Care plan updated Yes Education given and documented Yes  Care Plan initiated and Co-Morbidities added: Yes  Education Assessment documented:Yes  Admission Education Documented: Yes  If present CAUTI/CLABI Education done: Yes  New medication patient education completed and documented (Possible Side Effects of Common Medications handout): Yes  Allergies Reviewed: Yes  Admission Medication Reconciliation completed: Yes  Home medications if not able to send immediately home with family stored here: NA  Reminder note placed in discharge instructions regarding home meds: NA  Individualized care needs/preferences addressed and charted: Yes  Provider Notified that patient has arrived to the unit: Yes

## 2025-01-08 NOTE — ANESTHESIA PROCEDURE NOTES
Airway       Patient location during procedure: OR       Procedure Start/Stop Times: 1/8/2025 7:37 AM  Staff -        CRNA: Obed Faustin APRN CRNA       Performed By: CRNA  Consent for Airway        Urgency: elective  Indications and Patient Condition       Indications for airway management: gisel-procedural       Induction type:intravenous       Mask difficulty assessment: 2 - vent by mask + OA or adjuvant +/- NMBA    Final Airway Details       Final airway type: endotracheal airway       Successful airway: ETT - single  Endotracheal Airway Details        ETT size (mm): 7.5       Cuffed: yes       Successful intubation technique: direct laryngoscopy       DL Blade Type: MAC 3       Grade View of Cords: 2       Adjucts: stylet       Position: Right       Measured from: lips       Secured at (cm): 23       Bite block used: None    Post intubation assessment        Placement verified by: capnometry, equal breath sounds and chest rise        Number of attempts at approach: 1       Number of other approaches attempted: 0       Secured with: tape       Ease of procedure: easy       Dentition: Intact and Unchanged    Medication(s) Administered   Medication Administration Time: 1/8/2025 7:37 AM

## 2025-01-08 NOTE — ANESTHESIA PREPROCEDURE EVALUATION
Anesthesia Pre-Procedure Evaluation    Patient: Da Akins   MRN: [id number] : 1965        Procedure : Procedure(s):  ARTHROPLASTY, HIP, TOTAL, DIRECT ANTERIOR APPROACH          Past Medical History:   Diagnosis Date    Arthritis 2017    Arthritis in right hip    Healthy adult     Hypertension 2005    Take prescription    Seasonal allergies     Type 2 diabetes mellitus with hyperglycemia, without long-term current use of insulin (H) 2023      Past Surgical History:   Procedure Laterality Date    ANKLE SURGERY      COLONOSCOPY N/A 2023    Procedure: COLONOSCOPY;  Surgeon: Jose Montnaez MD;  Location: [facility]    gnaglion cyst remove Right     REMOVE FOREIGN BODY FOOT Right 2024    Procedure: Excision foreign body right foot;  Surgeon: Stanton Resendiz DPM;  Location: [facility] OR      Allergies   Allergen Reactions    Seasonal Allergies       Social History     Tobacco Use    Smoking status: Never    Smokeless tobacco: Never   Substance Use Topics    Alcohol use: Yes      Wt Readings from Last 1 Encounters:   25 88.9 kg (196 lb)        Anesthesia Evaluation   Pt has had prior anesthetic. Type: General and MAC.    No history of anesthetic complications       ROS/MED HX  ENT/Pulmonary:     (+)     NAS risk factors,  hypertension,                                 Neurologic:  - neg neurologic ROS     Cardiovascular:     (+)  hypertension- -   -  - -                                      METS/Exercise Tolerance:     Hematologic:  - neg hematologic  ROS     Musculoskeletal:   (+)  arthritis,             GI/Hepatic:  - neg GI/hepatic ROS     Renal/Genitourinary:  - neg Renal ROS     Endo:     (+)  type II DM, Last HgA1c: 7.5, date: 24, Not using insulin,          Obesity,       Psychiatric/Substance Use:  - neg psychiatric ROS     Infectious Disease:  - neg infectious disease ROS     Malignancy:  - neg malignancy ROS     Other:  - neg other ROS          Physical Exam    Airway  airway  "exam normal      Mallampati: III   TM distance: > 3 FB   Neck ROM: full   Mouth opening: > 3 cm    Respiratory Devices and Support         Dental       (+) Modest Abnormalities - crowns, retainers, 1 or 2 missing teeth      Cardiovascular   cardiovascular exam normal       Rhythm and rate: regular and normal     Pulmonary   pulmonary exam normal        breath sounds clear to auscultation           OUTSIDE LABS:  CBC:   Lab Results   Component Value Date    WBC 8.6 12/09/2024    WBC 9.9 11/30/2015    HGB 13.5 12/09/2024    HGB 15.0 11/30/2015    HCT 39.6 (L) 12/09/2024    HCT 43.4 11/30/2015     12/09/2024     11/30/2015     BMP:   Lab Results   Component Value Date     12/09/2024     02/08/2024    POTASSIUM 3.6 12/09/2024    POTASSIUM 3.9 02/08/2024    CHLORIDE 100 12/09/2024    CHLORIDE 102 02/08/2024    CO2 28 12/09/2024    CO2 27 02/08/2024    BUN 24.3 (H) 12/09/2024    BUN 17.1 02/08/2024    CR 1.22 (H) 12/09/2024    CR 1.14 02/08/2024     (H) 12/13/2024     (H) 12/09/2024     COAGS: No results found for: \"PTT\", \"INR\", \"FIBR\"  POC: No results found for: \"BGM\", \"HCG\", \"HCGS\"  HEPATIC:   Lab Results   Component Value Date    ALBUMIN 4.2 10/23/2023    PROTTOTAL 6.8 10/23/2023    ALT 32 10/23/2023    AST 23 10/23/2023    ALKPHOS 97 10/23/2023    BILITOTAL 0.3 10/23/2023     OTHER:   Lab Results   Component Value Date    A1C 7.5 (H) 12/09/2024    ORTEGA 9.6 12/09/2024    TSH 1.61 09/29/2023       Anesthesia Plan    ASA Status:  2    NPO Status:  NPO Appropriate    Anesthesia Type: General.     - Airway: ETT   Induction: Intravenous, Propofol.   Maintenance: Inhalation.        Consents    Anesthesia Plan(s) and associated risks, benefits, and realistic alternatives discussed. Questions answered and patient/representative(s) expressed understanding.     - Discussed: Risks, Benefits and Alternatives for the PROCEDURE were discussed     - Discussed with:  Patient      - Extended " "Intubation/Ventilatory Support Discussed: No.      - Patient is DNR/DNI Status: No     Use of blood products discussed: No .     Postoperative Care    Pain management: IV analgesics, Oral pain medications, Multi-modal analgesia.   PONV prophylaxis: Ondansetron (or other 5HT-3), Dexamethasone or Solumedrol     Comments:               SEVEN Jay CRNA    I have reviewed the pertinent notes and labs in the chart from the past 30 days and (re)examined the patient.  Any updates or changes from those notes are reflected in this note.             # Drug Induced Platelet Defect: home medication list includes an antiplatelet medication   # Hypertension: Noted on problem list          # DMII: A1C = 7.5 % (Ref range: <5.7 %) within past 6 months    # Obesity: Estimated body mass index is 32.12 kg/m  as calculated from the following:    Height as of 1/7/25: 1.664 m (5' 5.5\").    Weight as of 1/7/25: 88.9 kg (196 lb).             "

## 2025-01-08 NOTE — ANESTHESIA CARE TRANSFER NOTE
Patient: Da Akins    Procedure: Procedure(s):  ARTHROPLASTY, RIGHT HIP, TOTAL, DIRECT ANTERIOR APPROACH       Diagnosis: Primary osteoarthritis of right hip [M16.11]  Diagnosis Additional Information: No value filed.    Anesthesia Type:   General     Note:    Oropharynx: oropharynx clear of all foreign objects and spontaneously breathing  Level of Consciousness: drowsy  Oxygen Supplementation: face mask  Level of Supplemental Oxygen (L/min / FiO2): 6  Independent Airway: airway patency satisfactory and stable  Dentition: dentition unchanged  Vital Signs Stable: post-procedure vital signs reviewed and stable  Report to RN Given: handoff report given  Patient transferred to: PACU    Handoff Report: Identifed the Patient, Identified the Reponsible Provider, Reviewed the pertinent medical history, Discussed the surgical course, Reviewed Intra-OP anesthesia mangement and issues during anesthesia, Set expectations for post-procedure period and Allowed opportunity for questions and acknowledgement of understanding  Vitals:  Vitals Value Taken Time   BP 95/58 01/08/25 1130   Temp 99.14  F (37.3  C) 01/08/25 1135   Pulse 67 01/08/25 1135   Resp 8 01/08/25 1135   SpO2 96 % 01/08/25 1135   Vitals shown include unfiled device data.    Electronically Signed By: SEVEN Thakkar CRNA  January 8, 2025  11:36 AM

## 2025-01-08 NOTE — PROGRESS NOTES
01/08/25 1434   Appointment Info   Signing Clinician's Name / Credentials (PT) Trina Huizar PT, DPT, ATC, LAT   Rehab Comments (PT) PT eval and treat post op hip. Activity orders: up in chair, up with assist, ambulate with assist, ice, no adduction, WBAT   Living Environment   People in Home spouse;child(mendel), dependent   Current Living Arrangements house   Home Accessibility stairs to enter home;stairs within home   Number of Stairs, Main Entrance 3   Stair Railings, Main Entrance none   Number of Stairs, Within Home, Primary greater than 10 stairs   Stair Railings, Within Home, Primary railing on left side (ascending)   Transportation Anticipated family or friend will provide   Living Environment Comments wife able to support at home, main level living (with sunken living room) or upper level bedroom. bathroom on each level, step over tub   Self-Care   Usual Activity Tolerance moderate   Current Activity Tolerance fair   Regular Exercise No   Equipment Currently Used at Home none   Fall history within last six months no   Activity/Exercise/Self-Care Comment has a walker and crutches  for use at HI   General Information   Onset of Illness/Injury or Date of Surgery 01/08/25   Referring Physician Ronald Vieyra MD   Patient/Family Therapy Goals Statement (PT) home with family   Pertinent History of Current Problem (include personal factors and/or comorbidities that impact the POC) Patient is a 59 year old male, day 0 status post right total hip arthroplasty, direct anterior approach, by Dr. Vieyra, 500mL EBL,  general anesthesia. Patient with a previous medical history HLD, HTN, DM type 2.   Existing Precautions/Restrictions fall;no hip ADD past midline   Weight-Bearing Status - LUE full weight-bearing   Weight-Bearing Status - RUE full weight-bearing   Weight-Bearing Status - LLE full weight-bearing   Weight-Bearing Status - RLE weight-bearing as tolerated   General Observations saline locked by RN,  on room air, up in recliner, wife and 3 children present. see vitals signs for orthostatic BP assessment   Cognition   Affect/Mental Status (Cognition) WFL   Orientation Status (Cognition) oriented x 4   Follows Commands (Cognition) WFL   Pain Assessment   Patient Currently in Pain No   Integumentary/Edema   Integumentary/Edema Comments post op dresing CDI   Posture    Posture Not impaired   Range of Motion (ROM)   ROM Comment right hip 0-90*   Strength (Manual Muscle Testing)   Strength (Manual Muscle Testing) Able to perform R SLR;Able to perform L SLR   Bed Mobility   Bed Mobility supine-sit;sit-supine   Supine-Sit [place] (Bed Mobility) verbal cues;supervision   Sit-Supine [place] (Bed Mobility) verbal cues;supervision   Impairments Contributing to Impaired Bed Mobility decreased strength;decreased ROM   Transfers   Transfers sit-stand transfer   Maintains Weight-bearing Status (Transfers) able to maintain   Sit-Stand Transfer   Sit-Stand [place] (Transfers) verbal cues;supervision   Assistive Device (Sit-Stand Transfers) walker, front-wheeled   Gait/Stairs (Locomotion)   [place] Level (Gait) supervision;verbal cues   Assistive Device (Gait) walker, front-wheeled   Distance in Feet (Gait) 10   Pattern (Gait) step-to   Deviations/Abnormal Patterns (Gait) antalgic;base of support, narrow;gait speed decreased;stride length decreased   Balance   Balance Comments IND sitting balance. IND standing balance with walker support, no LOB with any mobility   Sensory Examination   Sensory Perception patient reports no sensory changes   Coordination   Coordination no deficits were identified   Muscle Tone   Muscle Tone no deficits were identified   Clinical Impression   Criteria for Skilled Therapeutic Intervention Yes, treatment indicated   PT Diagnosis (PT) impaired mobility, muscle weakness, joint stiffness   Influenced by the following impairments day 0 status post ANAI   Functional limitations due to  impairments use of walker, fatigue, pain   Clinical Presentation (PT Evaluation Complexity) evolving   Clinical Presentation Rationale post op acuity, medical status, clinical judgement   Clinical Decision Making (Complexity) moderate complexity   Planned Therapy Interventions (PT) balance training;bed mobility training;gait training;home exercise program;ROM (range of motion);stair training;patient/family education;strengthening;transfer training;progressive activity/exercise;home program guidelines   Risk & Benefits of therapy have been explained evaluation/treatment results reviewed;participants voiced agreement with care plan;participants included;patient   Clinical Impression Comments Following today's functional mobility training, education and functional performance patient is able to complete mobility necessary to manage the home environment with assistance available at home. Anticipate the patient will do well with discharge home with progression to outpatient physical therapy in order to progress towards desired level of function in accordance with the surgeon's protocol. No skilled IP OT needs identified and no additional IP PT needs.   PT Total Evaluation Time   PT Eval, Moderate Complexity Minutes (23008) 12   Physical Therapy Goals   PT Frequency One time eval and treatment only   PT Predicted Duration/Target Date for Goal Attainment 01/08/25   PT Goals Bed Mobility;Transfers;Gait;Stairs   PT: Bed Mobility Independent;Supine to/from sit;Within precautions   PT: Transfers Sit to/from stand;Assistive device;Within precautions;Modified independent   PT: Gait Modified independent;Rolling walker;100 feet;Within precautions   PT: Stairs Supervision/stand-by assist;2 stairs;Assistive device;Within precautions   PT Discharge Planning   PT Plan no IP PT or OT needs   PT Discharge Recommendation (DC Rec) home with assist;home with outpatient physical therapy   PT Rationale for DC Rec Patient from home with  family support, no AD at baseline, good home set up with main level living. Following today's functional mobility training, education and functional performance patient is able to complete mobility necessary to manage the home environment with assistance available at home. Anticipate the patient will do well with discharge home with progression to outpatient physical therapy in order to progress towards desired level of function in accordance with the surgeon's protocol. No skilled IP OT needs identified and no additional IP PT needs.   PT Brief overview of current status IND bed mobility. MOD IND sit to/from stand with walker. MOD IND ambulation with 2WW 180'. SBA 8 stairs with various support. HEP well tolerated.   PT Total Distance Amb During Session (feet) 170   Physical Therapy Time and Intention   Total Session Time (sum of timed and untimed services) 12     Thank you for your referral.  Trina Huizar, PT, DPT, ATC, LAT    [facility]  O: [phone]  E: [email or web address]     in reach, no questions for PT. Agreeable to DC home today.   Gait Training   Gait Training Minutes (91715) 12   Symptoms Noted During/After Treatment (Gait Training) fatigue;increased pain   Treatment Detail/Skilled Intervention PT: patient completed pregait with good stability. Patient completed pivot transfer in room for chair to bed transfer. Following bed completed ambulation with walker with SBA to MOD IND, with no safety concerns. Patient progressed from step to gait to step through. Turning with good mechanics and proper marching. Patient completed 2 stairs with bilat hand rials, 4 stairs with left hand rail and 4 stairs with bilateral crutches. Completed retro descent x 2. All tasks with good control and no safety concerns. Ambulated back to room with walker, MOD IND and wife comfortable with current mobility.   Distance in Feet 170   [place] Level (Gait Training) independent   Physical Assistance Level (Gait Training) supervision;verbal cues   Weight Bearing (Gait Training) weight-bearing as tolerated   Assistive Device (Gait Training) rolling walker   Pattern Analysis (Gait Training) swing-to gait   Gait Analysis Deviations decreased toe-to-floor clearance;decreased stride length;decreased step length;increased time in double stance   Impairments (Gait Analysis/Training) pain;strength decreased   Stair Railings other (see comments)   Physical Assist/Nonphysical Assist (Stairs) supervision;verbal cues   Level of [place] (Stairs) stand-by assist   PT Discharge Planning   PT Plan no IP PT or OT needs   PT Discharge Recommendation (DC Rec) home with assist;home with outpatient physical therapy   PT Rationale for DC Rec Patient from home with family support, no AD at baseline, good home set up with main level living. Following today's functional mobility training, education and functional performance patient is able to complete mobility necessary to manage the home environment with assistance  available at home. Anticipate the patient will do well with discharge home with progression to outpatient physical therapy in order to progress towards desired level of function in accordance with the surgeon's protocol. No skilled IP OT needs identified and no additional IP PT needs.   PT Brief overview of current status IND bed mobility. MOD IND sit to/from stand with walker. MOD IND ambulation with 2WW 180'. SBA 8 stairs with various support. HEP well tolerated.   PT Total Distance Amb During Session (feet) 180   Physical Therapy Time and Intention   Timed Code Treatment Minutes 40   Total Session Time (sum of timed and untimed services) 52                                                                                 [facility]      OUTPATIENT PHYSICAL THERAPY EVALUATION  PLAN OF TREATMENT FOR OUTPATIENT REHABILITATION  (COMPLETE FOR INITIAL CLAIMS ONLY)  Patient's Last Name, First Name, M.I.  YOB: 1965  Da Akins                        Provider's Name  [facility] Medical Record No.  [id number]                               Onset Date:  01/08/25   Start of Care Date:  01/08/25      Type:     _X_PT   ___OT   ___SLP Medical Diagnosis:  s/p R ANAI                        PT Diagnosis:  impaired mobility, muscle weakness, joint stiffness   Visits from SOC:  1   _________________________________________________________________________________  Plan of Treatment/Functional Goals    Planned Interventions: balance training, bed mobility training, gait training, home exercise program, ROM (range of motion), stair training, patient/family education, strengthening, transfer training, progressive activity/exercise, home program guidelines     Goals: See Physical Therapy Goals on Care Plan in [facility] electronic health record.    Therapy Frequency: One time eval and treatment only  Predicted Duration of Therapy Intervention:  01/08/25  _________________________________________________________________________________    I CERTIFY THE NEED FOR THESE SERVICES FURNISHED UNDER        THIS PLAN OF TREATMENT AND WHILE UNDER MY CARE     (Physician attestation of this document indicates review and certification of the therapy plan).              Certification date from: 01/08/25, Certification date to: 01/08/25    Referring Physician: Ronald Vieyra MD            Initial Assessment        See Physical Therapy evaluation dated 01/08/25 in Epic electronic health record.    Thank you for your referral.  Trina Huizar, PT, DPT, ATC, [facility]  O: [phone]  E: [email or web address]

## 2025-01-09 NOTE — ANESTHESIA POSTPROCEDURE EVALUATION
Patient: Da Akins    Procedure: Procedure(s):  ARTHROPLASTY, RIGHT HIP, TOTAL, DIRECT ANTERIOR APPROACH       Anesthesia Type:  General    Note:  Disposition: Outpatient   Postop Pain Control: Uneventful            Sign Out: Well controlled pain   PONV: No   Neuro/Psych: Uneventful            Sign Out: Acceptable/Baseline neuro status   Airway/Respiratory: Uneventful            Sign Out: Acceptable/Baseline resp. status   CV/Hemodynamics: Uneventful            Sign Out: Acceptable CV status; No obvious hypovolemia; No obvious fluid overload   Other NRE: NONE   DID A NON-ROUTINE EVENT OCCUR? No           Last vitals:  Vitals Value Taken Time   /80 01/08/25 1220   Temp 98.6  F (37  C) 01/08/25 1225   Pulse 87 01/08/25 1225   Resp 10 01/08/25 1225   SpO2 94 % 01/08/25 1225       Electronically Signed By: SEVEN Jay CRNA  January 9, 2025  10:34 AM

## 2025-01-19 ASSESSMENT — HOOS JR
GOING UP OR DOWN STAIRS: MILD
HOOS JR TOTAL INTERVAL SCORE: 76.78
WALKING ON UNEVEN SURFACE: MILD
LYING IN BED (TURNING OVER, MAINTAINING HIP POSITION): MILD
RISING FROM SITTING: MILD

## 2025-01-21 ENCOUNTER — OFFICE VISIT (OUTPATIENT)
Dept: ORTHOPEDICS | Facility: CLINIC | Age: 60
End: 2025-01-21
Payer: COMMERCIAL

## 2025-01-21 VITALS
WEIGHT: 196 LBS | BODY MASS INDEX: 31.5 KG/M2 | DIASTOLIC BLOOD PRESSURE: 83 MMHG | HEIGHT: 66 IN | HEART RATE: 92 BPM | SYSTOLIC BLOOD PRESSURE: 146 MMHG

## 2025-01-21 DIAGNOSIS — Z96.641 HISTORY OF TOTAL RIGHT HIP REPLACEMENT: Primary | ICD-10-CM

## 2025-01-21 PROCEDURE — 99024 POSTOP FOLLOW-UP VISIT: CPT | Performed by: ORTHOPAEDIC SURGERY

## 2025-01-21 NOTE — PROGRESS NOTES
Follow up right total hip arthroplasty on 1/8/25 with direct anterior approach .    He has no complaints. He did have significant swelling of thigh, but this has gone down.  Wound is healing well.  There is not a raw area at upper wound.  No numbness.  No warmth or erythema.  He is using no aids to walk.      Assessment:  right total hip arthroplasty doing well.  Plan:  No flexion restrictions.  Start scar massage with vitamin-E cream.   Use aspirin for 4 more weeks.  Medication renewal:  None # 0.  Return to clinic 4 weeks with xray - low AP pelvis and lateral right hip.

## 2025-01-21 NOTE — PATIENT INSTRUCTIONS
Da to follow up with Primary Care provider regarding elevated blood pressure.     right total hip arthroplasty doing well.  Plan:  No flexion restrictions.  Start scar massage with vitamin-E cream.   Use aspirin for 4 more weeks.  Medication renewal:  None # 0.  Return to clinic 4 weeks with xray - low AP pelvis and lateral right hip.

## 2025-01-21 NOTE — LETTER
January 21, 2025      Da Akins  [address] [address] [place] [id number]        To Whom It May Concern:    Da Akins was seen in our clinic for surgical follow up. Patient may return to work on 01/29/25 without restrictions.     Sincerely,          Ronald Vieyra

## 2025-01-21 NOTE — LETTER
1/21/2025      Da Akins  [address] [address] [place] [id number]      Dear Colleague,    Thank you for referring your patient, aD Akins, to the [facility]. Please see a copy of my visit note below.    Follow up right total hip arthroplasty on 1/8/25 with direct anterior approach .    He has no complaints. He did have significant swelling of thigh, but this has gone down.  Wound is healing well.  There is not a raw area at upper wound.  No numbness.  No warmth or erythema.  He is using no aids to walk.      Assessment:  right total hip arthroplasty doing well.  Plan:  No flexion restrictions.  Start scar massage with vitamin-E cream.   Use aspirin for 4 more weeks.  Medication renewal:  None # 0.  Return to clinic 4 weeks with xray - low AP pelvis and lateral right hip.       Again, thank you for allowing me to participate in the care of your patient.        Sincerely,        Ronald Vieyra MD    Electronically signed

## 2025-02-05 DIAGNOSIS — Z96.641 HISTORY OF TOTAL RIGHT HIP REPLACEMENT: ICD-10-CM

## 2025-02-05 DIAGNOSIS — M16.11 PRIMARY OSTEOARTHRITIS OF RIGHT HIP: ICD-10-CM

## 2025-02-06 RX ORDER — ASPIRIN 325 MG
325 TABLET, DELAYED RELEASE (ENTERIC COATED) ORAL 2 TIMES DAILY
Qty: 80 TABLET | Refills: 0 | Status: SHIPPED | OUTPATIENT
Start: 2025-02-06

## 2025-02-18 ENCOUNTER — OFFICE VISIT (OUTPATIENT)
Dept: FAMILY MEDICINE | Facility: CLINIC | Age: 60
End: 2025-02-18
Attending: FAMILY MEDICINE
Payer: COMMERCIAL

## 2025-02-18 VITALS
BODY MASS INDEX: 30.18 KG/M2 | HEART RATE: 67 BPM | HEIGHT: 66 IN | RESPIRATION RATE: 16 BRPM | SYSTOLIC BLOOD PRESSURE: 124 MMHG | TEMPERATURE: 98.5 F | OXYGEN SATURATION: 100 % | WEIGHT: 187.8 LBS | DIASTOLIC BLOOD PRESSURE: 78 MMHG

## 2025-02-18 DIAGNOSIS — I10 BENIGN ESSENTIAL HYPERTENSION: ICD-10-CM

## 2025-02-18 DIAGNOSIS — E11.9 TYPE 2 DIABETES MELLITUS WITHOUT COMPLICATION, WITHOUT LONG-TERM CURRENT USE OF INSULIN (H): ICD-10-CM

## 2025-02-18 PROCEDURE — 99214 OFFICE O/P EST MOD 30 MIN: CPT | Performed by: FAMILY MEDICINE

## 2025-02-18 PROCEDURE — G2211 COMPLEX E/M VISIT ADD ON: HCPCS | Performed by: FAMILY MEDICINE

## 2025-02-18 RX ORDER — CARVEDILOL 12.5 MG/1
12.5 TABLET ORAL 2 TIMES DAILY WITH MEALS
Qty: 60 TABLET | Refills: 2 | Status: SHIPPED | OUTPATIENT
Start: 2025-02-18

## 2025-02-18 RX ORDER — METFORMIN HYDROCHLORIDE 500 MG/1
1000 TABLET, EXTENDED RELEASE ORAL 2 TIMES DAILY WITH MEALS
Qty: 360 TABLET | Refills: 1 | Status: SHIPPED | OUTPATIENT
Start: 2025-02-18

## 2025-02-18 ASSESSMENT — PAIN SCALES - GENERAL: PAINLEVEL_OUTOF10: NO PAIN (0)

## 2025-02-18 NOTE — PROGRESS NOTES
"  Assessment & Plan     ASSESSMENT/ORDERS:    ICD-10-CM    1. Benign essential hypertension  I10 carvedilol (COREG) 12.5 MG tablet      2. Type 2 diabetes mellitus without complication, without long-term current use of insulin (H)  E11.9 metFORMIN (GLUCOPHAGE XR) 500 MG 24 hr tablet     FOOT EXAM     semaglutide (OZEMPIC) 2 MG/3ML pen     semaglutide (OZEMPIC) 2 MG/3ML pen        PLAN:  Started on Ozempic (semaglutide) for imprvoed diabetes control. Ramping dose. He will contact us when he is near end of 0.5 mg dose and will increase to 1 mg dose. Warned of possible hypoglycemia with metformin and should contact us if noticing this.   Nurse blood pressure recheck in 2 weeks with home meter check.  Medications refilled for all other above noted stable conditions.  Labs ordered as noted above.   The longitudinal plan of care for the diagnosis(es)/condition(s) as documented were addressed during this visit. Due to the added complexity in care, I will continue to support Da in the subsequent management and with ongoing continuity of care.          BMI  Estimated body mass index is 30.78 kg/m  as calculated from the following:    Height as of this encounter: 1.664 m (5' 5.5\").    Weight as of this encounter: 85.2 kg (187 lb 12.8 oz).             Subjective   Da is a 59 year old, presenting for the following health issues:  Diabetes        2/18/2025    10:45 AM   Additional Questions   Roomed by Kesha ARELLANO     History of Present Illness               Diabetes and hypertension follow-up.  Is interested in improving glycemic control with GLP-1 agonist.    Lab Results   Component Value Date    A1C 7.5 12/09/2024    A1C 7.9 08/08/2024    A1C 7.5 02/08/2024    A1C 8.6 10/23/2023    A1C 8.5 07/20/2023    A1C 6.2 11/08/2018      On losartan, coreg for hypertension. Blood pressure at goal today, but has been high on home meter.        Objective    /78   Pulse 67   Temp 98.5  F (36.9  C) (Temporal)   Resp 16   Ht " "1.664 m (5' 5.5\")   Wt 85.2 kg (187 lb 12.8 oz)   SpO2 100%   BMI 30.78 kg/m    Body mass index is 30.78 kg/m .  Physical Exam  Constitutional:       Appearance: Normal appearance. He is well-developed.   Cardiovascular:      Rate and Rhythm: Normal rate and regular rhythm.      Heart sounds: Normal heart sounds, S1 normal and S2 normal. No murmur heard.  Pulmonary:      Effort: Pulmonary effort is normal. No respiratory distress.      Breath sounds: Normal breath sounds. No wheezing, rhonchi or rales.   Musculoskeletal:      Comments: FEET:  monofilament exam normal bilaterally.  Good hair growth.  Dorsalis pedis pulses 2+ bilaterally.  Feet warm.    Feet:      Right foot:      Skin integrity: No ulcer, blister, skin breakdown or erythema.      Left foot:      Skin integrity: No ulcer, blister, skin breakdown or erythema.   Neurological:      Mental Status: He is alert.   Psychiatric:         Behavior: Behavior is cooperative.                    Signed Electronically by: Minh Benavides MD    "

## 2025-02-18 NOTE — PATIENT INSTRUCTIONS
Message Dr. Benavides in 2 months to let him know how Ozempic (semaglutide) is going and to ask for 1 mg dose refill.

## 2025-02-24 ASSESSMENT — HOOS JR
HOOS JR TOTAL INTERVAL SCORE: 92.34
BENDING TO THE FLOOR TO PICK UP OBJECT: MILD

## 2025-02-25 ENCOUNTER — OFFICE VISIT (OUTPATIENT)
Dept: ORTHOPEDICS | Facility: CLINIC | Age: 60
End: 2025-02-25
Payer: COMMERCIAL

## 2025-02-25 ENCOUNTER — ANCILLARY PROCEDURE (OUTPATIENT)
Dept: GENERAL RADIOLOGY | Facility: CLINIC | Age: 60
End: 2025-02-25
Attending: ORTHOPAEDIC SURGERY
Payer: COMMERCIAL

## 2025-02-25 VITALS — HEIGHT: 66 IN | RESPIRATION RATE: 18 BRPM | WEIGHT: 185 LBS | BODY MASS INDEX: 29.73 KG/M2

## 2025-02-25 DIAGNOSIS — Z96.641 HISTORY OF TOTAL RIGHT HIP REPLACEMENT: Primary | ICD-10-CM

## 2025-02-25 DIAGNOSIS — Z96.641 HISTORY OF TOTAL RIGHT HIP REPLACEMENT: ICD-10-CM

## 2025-02-25 PROCEDURE — 99024 POSTOP FOLLOW-UP VISIT: CPT | Performed by: ORTHOPAEDIC SURGERY

## 2025-02-25 PROCEDURE — 73502 X-RAY EXAM HIP UNI 2-3 VIEWS: CPT | Mod: TC | Performed by: INTERNAL MEDICINE

## 2025-02-25 RX ORDER — AMOXICILLIN 500 MG/1
2000 CAPSULE ORAL ONCE
Qty: 12 CAPSULE | Refills: 4 | Status: SHIPPED | OUTPATIENT
Start: 2025-02-25 | End: 2025-02-25

## 2025-02-25 NOTE — PROGRESS NOTES
6 week follow up right total hip arthroplasty on 1/8/25 with direct anterior approach.    He has no complaints  He  is using no aids  to walk.    Wound is healed well.  He has no abductor lurch.  Range of motion internal rotation 15 degrees, external rotation 45 degrees, rest of ROM normal. This matches left hip range of motion.    Xray: Low AP pelvis and lateral of right hip was performed today.   The position of components is excellent    Leg length :nearly equal.      Medication renewal:  None .    Assessment:  6 weeks status post right total hip arthroplasty.  Plan:  no flexion limits.  Start abductor strengthening.  Exercises demonstrated.  Use cane if there is any  limp.  Return to clinic 4-6 weeks to check walking.

## 2025-02-25 NOTE — LETTER
2/25/2025      Da Akins  [address] [address] [place] [id number]      Dear Colleague,    Thank you for referring your patient, Da Akins, to the [facility]. Please see a copy of my visit note below.    6 week follow up right total hip arthroplasty on 1/8/25 with direct anterior approach.    He has no complaints  He  is using no aids  to walk.    Wound is healed well.  He has no abductor lurch.  Range of motion internal rotation 15 degrees, external rotation 45 degrees, rest of ROM normal. This matches left hip range of motion.    Xray: Low AP pelvis and lateral of right hip was performed today.   The position of components is excellent    Leg length :nearly equal.      Medication renewal:  None .    Assessment:  6 weeks status post right total hip arthroplasty.  Plan:  no flexion limits.  Start abductor strengthening.  Exercises demonstrated.  Use cane if there is any  limp.  Return to clinic 4-6 weeks to check walking.      Again, thank you for allowing me to participate in the care of your patient.        Sincerely,        Ronald Vieyra MD    Electronically signed

## 2025-03-04 ENCOUNTER — ALLIED HEALTH/NURSE VISIT (OUTPATIENT)
Dept: FAMILY MEDICINE | Facility: CLINIC | Age: 60
End: 2025-03-04
Payer: COMMERCIAL

## 2025-03-04 VITALS — SYSTOLIC BLOOD PRESSURE: 128 MMHG | DIASTOLIC BLOOD PRESSURE: 78 MMHG

## 2025-03-04 DIAGNOSIS — I10 BENIGN ESSENTIAL HYPERTENSION: Primary | ICD-10-CM

## 2025-03-04 PROCEDURE — 99207 PR NO CHARGE NURSE ONLY: CPT

## 2025-03-04 NOTE — PROGRESS NOTES
Da Akins is a 59 year old patient who comes in today for a Blood Pressure check.  Initial BP:  /78      Data Unavailable  Disposition: follow-up as previously indicated by provider and results routed to provider    Emma Quiles MA 3/4/2025

## 2025-03-22 ENCOUNTER — HEALTH MAINTENANCE LETTER (OUTPATIENT)
Age: 60
End: 2025-03-22

## 2025-03-24 ENCOUNTER — MYC MEDICAL ADVICE (OUTPATIENT)
Dept: FAMILY MEDICINE | Facility: CLINIC | Age: 60
End: 2025-03-24
Payer: COMMERCIAL

## 2025-03-24 DIAGNOSIS — E11.9 TYPE 2 DIABETES MELLITUS WITHOUT COMPLICATION, WITHOUT LONG-TERM CURRENT USE OF INSULIN (H): Primary | ICD-10-CM

## 2025-04-03 ENCOUNTER — PATIENT OUTREACH (OUTPATIENT)
Dept: GASTROENTEROLOGY | Facility: CLINIC | Age: 60
End: 2025-04-03
Payer: COMMERCIAL

## 2025-04-22 ENCOUNTER — OFFICE VISIT (OUTPATIENT)
Dept: ORTHOPEDICS | Facility: CLINIC | Age: 60
End: 2025-04-22
Payer: COMMERCIAL

## 2025-04-22 ENCOUNTER — MYC REFILL (OUTPATIENT)
Dept: FAMILY MEDICINE | Facility: CLINIC | Age: 60
End: 2025-04-22

## 2025-04-22 VITALS — RESPIRATION RATE: 18 BRPM | HEIGHT: 66 IN | BODY MASS INDEX: 29.73 KG/M2 | WEIGHT: 185 LBS

## 2025-04-22 DIAGNOSIS — E11.9 TYPE 2 DIABETES MELLITUS WITHOUT COMPLICATION, WITHOUT LONG-TERM CURRENT USE OF INSULIN (H): ICD-10-CM

## 2025-04-22 DIAGNOSIS — Z96.641 HISTORY OF TOTAL RIGHT HIP REPLACEMENT: Primary | ICD-10-CM

## 2025-04-22 PROCEDURE — 99212 OFFICE O/P EST SF 10 MIN: CPT | Performed by: ORTHOPAEDIC SURGERY

## 2025-04-22 NOTE — PROGRESS NOTES
3 month follow up right total hip arthroplasty on 1/8/25.    He has no complaints  Pain is none  To walk he is using no aids.    Wound is healed well.  He has no abductor lurch.  He is doing abductor exercises appropriately.  Range of motion internal rotation 30 degrees, external rotation 45 degrees, rest of ROM normal.   Left hip has 20 degrees internal rotation, 50 degrees external rotation.      Assessment:  3 months status post right total hip arthroplasty.  Plan:  resume activity as tolerated.  Take amoxacillin 4 tablets before dental appointment. I would recommend this forever due to diabetes mellitus .  Return to clinic at 1 year from surgery ( January ) with low AP pelvis and right lateral hip x-rays.

## 2025-04-22 NOTE — LETTER
4/22/2025      Da Akins  [address] [address] [place] [id number]      Dear Colleague,    Thank you for referring your patient, Da Akins, to the [facility]. Please see a copy of my visit note below.        3 month follow up right total hip arthroplasty on 1/8/25.    He has no complaints  Pain is none  To walk he is using no aids.    Wound is healed well.  He has no abductor lurch.  He is doing abductor exercises appropriately.  Range of motion internal rotation 30 degrees, external rotation 45 degrees, rest of ROM normal.   Left hip has 20 degrees internal rotation, 50 degrees external rotation.      Assessment:  3 months status post right total hip arthroplasty.  Plan:  resume activity as tolerated.  Take amoxacillin 4 tablets before dental appointment. I would recommend this forever due to diabetes mellitus .  Return to clinic at 1 year from surgery ( January ) with low AP pelvis and right lateral hip x-rays.     Again, thank you for allowing me to participate in the care of your patient.        Sincerely,        Ronald Vieyra MD    Electronically signed

## 2025-05-20 ENCOUNTER — MYC MEDICAL ADVICE (OUTPATIENT)
Dept: FAMILY MEDICINE | Facility: CLINIC | Age: 60
End: 2025-05-20
Payer: COMMERCIAL

## 2025-05-20 DIAGNOSIS — E11.9 TYPE 2 DIABETES MELLITUS WITHOUT COMPLICATION, WITHOUT LONG-TERM CURRENT USE OF INSULIN (H): ICD-10-CM

## 2025-05-20 NOTE — TELEPHONE ENCOUNTER
Patient requesting next increase dose for Ozempic.     Currently on 1 mg dose. No side effects.     Routed to provider for review and approval/denial.     Lizabeth Carbajal RN on 5/20/2025 at 4:48 PM

## 2025-05-29 ENCOUNTER — OFFICE VISIT (OUTPATIENT)
Dept: FAMILY MEDICINE | Facility: CLINIC | Age: 60
End: 2025-05-29
Payer: COMMERCIAL

## 2025-05-29 ENCOUNTER — RESULTS FOLLOW-UP (OUTPATIENT)
Dept: FAMILY MEDICINE | Facility: CLINIC | Age: 60
End: 2025-05-29

## 2025-05-29 VITALS
TEMPERATURE: 98.4 F | SYSTOLIC BLOOD PRESSURE: 124 MMHG | WEIGHT: 187.2 LBS | OXYGEN SATURATION: 98 % | HEART RATE: 73 BPM | RESPIRATION RATE: 16 BRPM | BODY MASS INDEX: 30.68 KG/M2 | DIASTOLIC BLOOD PRESSURE: 72 MMHG

## 2025-05-29 DIAGNOSIS — I10 BENIGN ESSENTIAL HYPERTENSION: ICD-10-CM

## 2025-05-29 DIAGNOSIS — E11.9 TYPE 2 DIABETES MELLITUS WITHOUT COMPLICATION, WITHOUT LONG-TERM CURRENT USE OF INSULIN (H): ICD-10-CM

## 2025-05-29 PROBLEM — Z96.649 S/P TOTAL HIP ARTHROPLASTY: Status: RESOLVED | Noted: 2025-01-08 | Resolved: 2025-05-29

## 2025-05-29 PROBLEM — M16.12 PRIMARY OSTEOARTHRITIS OF LEFT HIP: Status: ACTIVE | Noted: 2022-06-30

## 2025-05-29 PROBLEM — M25.859 FEMORAL ACETABULAR IMPINGEMENT: Status: RESOLVED | Noted: 2022-06-30 | Resolved: 2025-05-29

## 2025-05-29 PROBLEM — Z96.641 HISTORY OF TOTAL RIGHT HIP REPLACEMENT: Status: RESOLVED | Noted: 2024-12-20 | Resolved: 2025-05-29

## 2025-05-29 PROBLEM — M16.11 PRIMARY OSTEOARTHRITIS OF RIGHT HIP: Status: RESOLVED | Noted: 2024-12-20 | Resolved: 2025-05-29

## 2025-05-29 LAB
EST. AVERAGE GLUCOSE BLD GHB EST-MCNC: 134 MG/DL
HBA1C MFR BLD: 6.3 %

## 2025-05-29 RX ORDER — CARVEDILOL 12.5 MG/1
12.5 TABLET ORAL 2 TIMES DAILY WITH MEALS
Qty: 180 TABLET | Refills: 4 | Status: SHIPPED | OUTPATIENT
Start: 2025-05-29

## 2025-05-29 ASSESSMENT — PAIN SCALES - GENERAL: PAINLEVEL_OUTOF10: NO PAIN (0)

## 2025-05-29 NOTE — PATIENT INSTRUCTIONS
Based on our discussion, I have outlined the following instructions for you:      - Keep taking your Ozempic at the new dose of 2 mg, along with your metformin.  - Watch out for signs of low blood sugar, like feeling dizzy or lightheaded. If you notice these, pay attention to how you feel.  - Plan to have a check-up in the fall, around September or October, to talk about how things are going.  - Keep taking your carvedilol at the new dose of 12.5 mg twice a day, and your losartan 100 mg once a day.  - Switch to getting a 90-day supply of your carvedilol medication.    Thank you again for your visit, and we look forward to supporting you in your journey to better health.

## 2025-05-29 NOTE — PROGRESS NOTES
"  Assessment & Plan     ASSESSMENT/ORDERS:    ICD-10-CM    1. Type 2 diabetes mellitus without complication, without long-term current use of insulin (H)  E11.9 HEMOGLOBIN A1C     HEMOGLOBIN A1C      2. Benign essential hypertension  I10 carvedilol (COREG) 12.5 MG tablet          Assessment & Plan  Type 2 diabetes mellitus without complication, without long-term current use of insulin (H)  - A1c has improved from 7.5 in December last year to 6.3 currently. Weight remains stable at 187 lbs since February. Ozempic and metformin are effective in managing blood sugar levels.  - Continue Ozempic at increased dose of 2 mg along with metformin. Monitor for potential low blood sugars and check if symptoms like dizziness or lightheadedness occur. Schedule a preventative visit in fall, likely September or October, to discuss further management.  - Risks and side effects: Occasional nausea and vomiting noted, occurring 2-4 days after dosing.    Benign essential hypertension  - Blood pressure is well-controlled at 124/72 with current medication regimen.  - Continue carvedilol at increased dose of 12.5 mg twice daily and losartan 100 mg daily. Transition to a 90-day supply for carvedilol.    The longitudinal plan of care for the diagnosis(es)/condition(s) as documented were addressed during this visit. Due to the added complexity in care, I will continue to support Da in the subsequent management and with ongoing continuity of care.            BMI  Estimated body mass index is 30.68 kg/m  as calculated from the following:    Height as of 4/22/25: 1.664 m (5' 5.5\").    Weight as of this encounter: 84.9 kg (187 lb 3.2 oz).             Subjective   Da is a 60 year old, presenting for the following health issues:  Diabetes and Hypertension        5/29/2025     7:53 AM   Additional Questions   Roomed by Kesha ARELLANO     History of Present Illness       Diabetes:   He presents for follow up of diabetes.  He is checking home blood " glucose a few times a month.   He checks blood glucose before meals.  Blood glucose is never over 200 and never under 70. He is aware of hypoglycemia symptoms including shakiness.    He has no concerns regarding his diabetes at this time.   He is not experiencing numbness or burning in feet, excessive thirst, blurry vision, weight changes or redness, sores or blisters on feet.           Hypertension: He presents for follow up of hypertension.  He does not check blood pressure  regularly outside of the clinic. Outpatient blood pressures have not been over 140/90. He follows a low salt diet.     He eats 0-1 servings of fruits and vegetables daily.He consumes 0 sweetened beverage(s) daily.He exercises with enough effort to increase his heart rate 10 to 19 minutes per day.  He exercises with enough effort to increase his heart rate 4 days per week.   He is taking medications regularly.   - Da Akins, 60-year-old male.  - A1c decreased to 6.3 from 7.5 since December 9 of the previous year.  - Began Ozempic in February alongside metformin to manage blood sugar and aid weight loss.  - Weight remained stable at 187 lbs since February 18.  - Recently increased Ozempic dose to 2 mg on Wednesday prior to the encounter.  - Occasionally experiences nausea and has vomited once or twice over the past three months, not directly after taking Ozempic.  - No significant issues with blood sugar levels noted; no self-monitoring reported.  - Previously experienced high blood sugar levels before starting Ozempic.  - Blood pressure medication adjusted previously; carvedilol dose increased to 12.5 mg for management.                  Objective    /72   Pulse 73   Temp 98.4  F (36.9  C) (Temporal)   Resp 16   Wt 84.9 kg (187 lb 3.2 oz)   SpO2 98%   BMI 30.68 kg/m    Body mass index is 30.68 kg/m .  Physical Exam   - CARDIOVASCULAR: Regular heart rate and rhythm, no murmurs.  - LUNGS: Clear lung fields, no wheezes.    Results     - A1c: 6.3 (current), 7.5 (December 9, last year)  - Blood pressure: 124/72             Signed Electronically by: Minh Benavides MD

## 2025-06-30 ENCOUNTER — OFFICE VISIT (OUTPATIENT)
Dept: FAMILY MEDICINE | Facility: CLINIC | Age: 60
End: 2025-06-30
Payer: COMMERCIAL

## 2025-06-30 VITALS
RESPIRATION RATE: 16 BRPM | HEIGHT: 66 IN | HEART RATE: 80 BPM | BODY MASS INDEX: 29.86 KG/M2 | WEIGHT: 185.8 LBS | SYSTOLIC BLOOD PRESSURE: 122 MMHG | DIASTOLIC BLOOD PRESSURE: 68 MMHG | TEMPERATURE: 97.2 F | OXYGEN SATURATION: 96 %

## 2025-06-30 DIAGNOSIS — M75.22 BICEPS TENDONITIS ON LEFT: Primary | ICD-10-CM

## 2025-06-30 PROCEDURE — G2211 COMPLEX E/M VISIT ADD ON: HCPCS | Performed by: FAMILY MEDICINE

## 2025-06-30 PROCEDURE — 99213 OFFICE O/P EST LOW 20 MIN: CPT | Performed by: FAMILY MEDICINE

## 2025-06-30 RX ORDER — MELOXICAM 15 MG/1
15 TABLET ORAL DAILY
Qty: 30 TABLET | Refills: 0 | Status: SHIPPED | OUTPATIENT
Start: 2025-06-30

## 2025-06-30 ASSESSMENT — PAIN SCALES - GENERAL: PAINLEVEL_OUTOF10: NO PAIN (0)

## 2025-06-30 NOTE — PATIENT INSTRUCTIONS
Based on our discussion, I have outlined the following instructions for you:      - Start physical therapy to help balance your muscles and make the muscles around your shoulder stronger.  - Take meloxicam for 10 to 14 days to help reduce swelling. You can also take Tylenol if you need extra pain relief.  - Use ice on your shoulder after you do activities to help with any pain or swelling.  - Expect a call to set up your physical therapy sessions.  - Plan to have a check-up in 4-6 weeks to see how well the physical therapy and medication are working if you are not getting better at all decide what we need to do next.    Thank you again for your visit, and we look forward to supporting you in your journey to better health.

## 2025-06-30 NOTE — PROGRESS NOTES
"  Assessment & Plan     ASSESSMENT/ORDERS:    ICD-10-CM    1. Biceps tendonitis on left  M75.22 meloxicam (MOBIC) 15 MG tablet     Physical Therapy  Referral          Assessment & Plan  Biceps tendonitis on left:  - Left biceps tendonitis confirmed based on physical examination. No need for a shoulder X-ray today as previous X-ray in 2018 showed mild degenerative changes of the AC joint, and current symptoms are not indicative of arthritis.  - Recommend physical therapy to address potential muscle imbalances and strengthen surrounding shoulder muscles. Prescribe meloxicam for 10 to 14 days to reduce inflammation, with or without Tylenol for pain relief. Advise icing after activity. Patient will be contacted to schedule physical therapy.  - Patient should follow up in 4-6 weeks to assess the effectiveness of physical therapy and medication, and to determine if further intervention is needed only if symptoms do not improve intially.    The longitudinal plan of care for the diagnosis(es)/condition(s) as documented were addressed during this visit. Due to the added complexity in care, I will continue to support Da in the subsequent management and with ongoing continuity of care.            BMI  Estimated body mass index is 29.99 kg/m  as calculated from the following:    Height as of this encounter: 1.676 m (5' 6\").    Weight as of this encounter: 84.3 kg (185 lb 12.8 oz).             Subjective   Da is a 60 year old, presenting for the following health issues:  Shoulder (Left shoulder pain)        6/30/2025    10:34 AM   Additional Questions   Roomed by Aracely LERNER         6/30/2025    10:34 AM   Patient Reported Additional Medications   Patient reports taking the following new medications allergy medication     History of Present Illness       Reason for visit:  Shoulder pain in both shoulders but more in the left one  Symptom onset:  3-4 weeks ago  Symptoms include:  Shoulder pain  Symptom intensity:  " "Moderate  Symptom progression:  Staying the same  Had these symptoms before:  No  What makes it worse:  When mowing and driving  What makes it better:  When i dont raise my arms   He is taking medications regularly.   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  - Pain alleviates when arms are lowered and rested for a few minutes  - No pain when reaching high  - No specific injury recalled that could have triggered the symptoms  - Left shoulder X-ray performed on May 15, 2018, after falling from a truck; showed mild degenerative changes of the AC joint, otherwise negative  - Point tenderness noted at the origin of the biceps tendon  - Reports weakness in internal rotation of the left shoulder  - No pain reported in the back of the shoulder  - Experiences acid reflux, particularly during the summer months                  Objective    /68   Pulse 80   Temp 97.2  F (36.2  C) (Temporal)   Resp 16   Ht 1.676 m (5' 6\")   Wt 84.3 kg (185 lb 12.8 oz)   SpO2 96%   BMI 29.99 kg/m    Body mass index is 29.99 kg/m .  Physical Exam   - MUSCULOSKELETAL: Left shoulder examination reveals point tenderness at the bicipital tendon origin. Strength testing shows 4+/5 strength with abduction of the left shoulder, 5/5 strength with adduction at 45 degrees, and 4/5 strength with internal rotation. Forward flexion and extension are intact. Mild weakness with external rotation. Negative Renae test. normal strength with elbow flexion/extension            Signed Electronically by: Minh Benavides MD    "

## 2025-07-16 ASSESSMENT — ACTIVITIES OF DAILY LIVING (ADL)
WASHING_YOUR_HAIR?: 0
PUTTING_ON_AN_UNDERSHIRT_OR_A_PULLOVER_SWEATER: 0
WHEN_LYING_ON_THE_INVOLVED_SIDE: 0
PUTTING_ON_YOUR_PANTS: 0
CARRYING_A_HEAVY_OBJECT_OF_10_POUNDS: 4
PLACING_AN_OBJECT_ON_A_HIGH_SHELF: 0
PUTTING_ON_A_SHIRT_THAT_BUTTONS_DOWN_THE_FRONT: 0
AT_ITS_WORST?: 8
WASHING_YOUR_BACK: 0
PLEASE_INDICATE_YOR_PRIMARY_REASON_FOR_REFERRAL_TO_THERAPY:: SHOULDER
PUSHING_WITH_THE_INVOLVED_ARM: 4
TOUCHING_THE_BACK_OF_YOUR_NECK: 1
REACHING_FOR_SOMETHING_ON_A_HIGH_SHELF: 0
REMOVING_SOMETHING_FROM_YOUR_BACK_POCKET: 0

## 2025-07-21 ENCOUNTER — THERAPY VISIT (OUTPATIENT)
Dept: PHYSICAL THERAPY | Facility: CLINIC | Age: 60
End: 2025-07-21
Attending: FAMILY MEDICINE
Payer: COMMERCIAL

## 2025-07-21 DIAGNOSIS — M75.22 BICEPS TENDONITIS ON LEFT: Primary | ICD-10-CM

## 2025-07-21 DIAGNOSIS — M75.42 IMPINGEMENT SYNDROME OF SHOULDER REGION, LEFT: ICD-10-CM

## 2025-07-21 PROCEDURE — 97161 PT EVAL LOW COMPLEX 20 MIN: CPT | Mod: GP | Performed by: PHYSICAL THERAPIST

## 2025-07-21 PROCEDURE — 97110 THERAPEUTIC EXERCISES: CPT | Mod: GP | Performed by: PHYSICAL THERAPIST

## 2025-07-21 NOTE — PROGRESS NOTES
PHYSICAL THERAPY EVALUATION  Type of Visit: Evaluation    Patient presents with signs and symptoms consistent with L shoulder pain secondary to biceps tendonitis and impingement. Patient demonstrates impairments including + impingement testing with end range restriction joint mobility as well as poor postural awareness. 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 Patient would benefit from skilled PT to progress and improve impairments and concerns.         Fall Risk Screen:  Have you fallen 2 or more times in the past year?: No  Have you fallen and had an injury in the past year?: No    Subjective     Symptoms just started this year, noticing it when he mows with zero turn, then with lowering the arm the symptoms can go away after a few minutes.         Presenting condition or subjective complaint: Shoulder pain left shoulder really notice it when mowing with my zero turn  Date of onset: 06/30/25    Relevant medical history: Arthritis; Diabetes; High blood pressure   Dates & types of surgery: 12/13 1/8  foot surgery had a piece of metal stuck in foot and hip replacement    Prior diagnostic imaging/testing results:       Prior therapy history for the same diagnosis, illness or injury: No        Living Environment  Social support: With family members   Type of home: House; Multi-level   Stairs to enter the home: Yes 3 Is there a railing: No     Ramp: No   Stairs inside the home: Yes 14 Is there a railing: Yes     Help at home: Home management tasks (cooking, cleaning); Home and Yard maintenance tasks  Equipment owned: Walker     Employment: Yes   Hobbies/Interests: Swimming walking biking    Patient goals for therapy: Make pain go away    Pain assessment: Pain present  See objective evaluation for additional pain details     Objective   Pain Location: anterior shoulder joint  Pain Quality: achy pains that sometimes even goes down into the hand on the mower    Pain Frequency: mowing every other week, driving truck everyday  Pain is Worst: morning and night are the same, but sometimes sore to sleep on the L side and rolling over on it  Pain is Exacerbated By: only really on zero turn after about 30 minutes he has to stop and shake it out, driving truck having arm up on steering wheel or on the window sill he can get shoulder pain  Pain is Relieved By: stopping mowing, MD gave him strong IBU and he was taking it and it seemed to work for a bit but then his legs started swelling up so he stopped taking it  Pain Progression: same symptoms since it has started    SHOULDER EVALUATION    POSTURE: Standing Posture: Rounded shoulders, Forward head  Sitting Posture: Rounded shoulders, Forward head    ROM: WFL ROM, some P at about 100 shoulder abduction on L but full range  MMT - WFL today with no inc of P    FLEXIBILITY: decreased pectorals, UT  Special Tests: + empty can impingement testing  PALPATION: no TTP found  JOINT MOBILITY: decreased end range flexion and abduction in supine   CERVICAL SCREEN: reported neck symptoms with driving truck      Assessment & Plan   CLINICAL IMPRESSIONS  Medical Diagnosis: Biceps tendonosis of L shoulder    Treatment Diagnosis:     Impression/Assessment: Patient is a 60 year old male with L shoulder pain complaints.  The following significant findings have been identified: Pain, Decreased ROM/flexibility, Decreased joint mobility, Decreased activity tolerance, and Impaired posture. These impairments interfere with their ability to perform self care tasks, work tasks, recreational activities, household chores, and driving  as compared to previous level of function.     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  Goal Progress: 7/21 - P with driving/using steering wheel  Target Date: 09/19/25      Frequency of Treatment: 1x/week for 4 weeks, 2x/month for 2 months, then PRN  Duration of Treatment: up to 70 days      Risks and benefits of evaluation/treatment have been explained.   Patient/Family/caregiver agrees with Plan of Care.     Evaluation Time:        Signing Clinician: NOEL VILLATORO PT        [facility]                                                                                   OUTPATIENT PHYSICAL THERAPY      PLAN OF TREATMENT FOR OUTPATIENT REHABILITATION   Patient's Last Name, First Name, DIANAYenniferMINEYennifer  Da Akins YOB: 1965   Provider's Name   [facility]   Medical Record No.  [id number]     Onset Date: 06/30/25  Start of Care Date: 07/21/25     Medical Diagnosis:  Biceps tendonosis of L shoulder      PT Treatment Diagnosis:    Plan of Treatment  Frequency/Duration: 1x/week for 4 weeks, 2x/month for 2 months, then PRN/ up to 70 days    Certification date from 07/21/25 to 09/28/25         See note for plan of treatment details and functional goals     NOEL VILLATORO, PT                         I CERTIFY THE NEED FOR THESE SERVICES FURNISHED UNDER        THIS PLAN OF TREATMENT  AND WHILE UNDER MY CARE     (Physician attestation of this document indicates review and certification of the therapy plan).              Referring Provider:  Minh Benavides    Initial Assessment  See Epic Evaluation- Start of Care Date: 07/21/25

## 2025-07-28 ENCOUNTER — THERAPY VISIT (OUTPATIENT)
Dept: PHYSICAL THERAPY | Facility: CLINIC | Age: 60
End: 2025-07-28
Attending: FAMILY MEDICINE
Payer: COMMERCIAL

## 2025-07-28 DIAGNOSIS — M75.42 IMPINGEMENT SYNDROME OF SHOULDER REGION, LEFT: ICD-10-CM

## 2025-07-28 DIAGNOSIS — M75.22 BICEPS TENDONITIS ON LEFT: Primary | ICD-10-CM

## 2025-07-28 PROCEDURE — 97112 NEUROMUSCULAR REEDUCATION: CPT | Mod: GP | Performed by: PHYSICAL THERAPIST

## 2025-07-28 PROCEDURE — 97110 THERAPEUTIC EXERCISES: CPT | Mod: GP | Performed by: PHYSICAL THERAPIST

## 2025-07-28 PROCEDURE — 97530 THERAPEUTIC ACTIVITIES: CPT | Mod: GP | Performed by: PHYSICAL THERAPIST

## 2025-08-11 ENCOUNTER — THERAPY VISIT (OUTPATIENT)
Dept: PHYSICAL THERAPY | Facility: CLINIC | Age: 60
End: 2025-08-11
Attending: FAMILY MEDICINE
Payer: COMMERCIAL

## 2025-08-11 DIAGNOSIS — M75.22 BICEPS TENDONITIS ON LEFT: Primary | ICD-10-CM

## 2025-08-11 DIAGNOSIS — M75.42 IMPINGEMENT SYNDROME OF SHOULDER REGION, LEFT: ICD-10-CM

## 2025-08-11 PROCEDURE — 97110 THERAPEUTIC EXERCISES: CPT | Mod: GP | Performed by: PHYSICAL THERAPIST

## 2025-08-25 ENCOUNTER — THERAPY VISIT (OUTPATIENT)
Dept: PHYSICAL THERAPY | Facility: CLINIC | Age: 60
End: 2025-08-25
Attending: FAMILY MEDICINE
Payer: COMMERCIAL

## 2025-08-25 DIAGNOSIS — M75.22 BICEPS TENDONITIS ON LEFT: Primary | ICD-10-CM

## 2025-08-25 DIAGNOSIS — M75.42 IMPINGEMENT SYNDROME OF SHOULDER REGION, LEFT: ICD-10-CM

## 2025-08-25 PROCEDURE — 97140 MANUAL THERAPY 1/> REGIONS: CPT | Mod: GP | Performed by: PHYSICAL THERAPIST

## 2025-08-25 PROCEDURE — 97110 THERAPEUTIC EXERCISES: CPT | Mod: GP | Performed by: PHYSICAL THERAPIST

## 2025-09-02 SDOH — HEALTH STABILITY: PHYSICAL HEALTH: ON AVERAGE, HOW MANY MINUTES DO YOU ENGAGE IN EXERCISE AT THIS LEVEL?: 0 MIN

## 2025-09-02 SDOH — HEALTH STABILITY: PHYSICAL HEALTH: ON AVERAGE, HOW MANY DAYS PER WEEK DO YOU ENGAGE IN MODERATE TO STRENUOUS EXERCISE (LIKE A BRISK WALK)?: 0 DAYS

## 2025-09-04 ENCOUNTER — OFFICE VISIT (OUTPATIENT)
Dept: FAMILY MEDICINE | Facility: CLINIC | Age: 60
End: 2025-09-04
Attending: FAMILY MEDICINE
Payer: COMMERCIAL

## 2025-09-04 VITALS
HEIGHT: 66 IN | WEIGHT: 191.2 LBS | HEART RATE: 74 BPM | BODY MASS INDEX: 30.73 KG/M2 | SYSTOLIC BLOOD PRESSURE: 118 MMHG | DIASTOLIC BLOOD PRESSURE: 76 MMHG | OXYGEN SATURATION: 99 % | TEMPERATURE: 98.2 F | RESPIRATION RATE: 18 BRPM

## 2025-09-04 DIAGNOSIS — Z00.00 ROUTINE GENERAL MEDICAL EXAMINATION AT A HEALTH CARE FACILITY: Primary | ICD-10-CM

## 2025-09-04 DIAGNOSIS — I10 BENIGN ESSENTIAL HYPERTENSION: ICD-10-CM

## 2025-09-04 DIAGNOSIS — E11.9 TYPE 2 DIABETES MELLITUS WITHOUT COMPLICATION, WITHOUT LONG-TERM CURRENT USE OF INSULIN (H): ICD-10-CM

## 2025-09-04 DIAGNOSIS — B35.6 JOCK ITCH: ICD-10-CM

## 2025-09-04 DIAGNOSIS — E78.2 MIXED HYPERLIPIDEMIA: ICD-10-CM

## 2025-09-04 LAB
ANION GAP SERPL CALCULATED.3IONS-SCNC: 12 MMOL/L (ref 7–15)
BUN SERPL-MCNC: 20 MG/DL (ref 6–20)
CALCIUM SERPL-MCNC: 9.4 MG/DL (ref 8.8–10.4)
CHLORIDE SERPL-SCNC: 103 MMOL/L (ref 98–107)
CHOLEST SERPL-MCNC: 102 MG/DL
CREAT SERPL-MCNC: 1.18 MG/DL (ref 0.67–1.17)
CREAT UR-MCNC: 203 MG/DL
EGFRCR SERPLBLD CKD-EPI 2021: 72 ML/MIN/1.73M2
EST. AVERAGE GLUCOSE BLD GHB EST-MCNC: 137 MG/DL
FASTING STATUS PATIENT QL REPORTED: YES
FASTING STATUS PATIENT QL REPORTED: YES
GLUCOSE SERPL-MCNC: 112 MG/DL (ref 70–99)
HBA1C MFR BLD: 6.4 %
HCO3 SERPL-SCNC: 23 MMOL/L (ref 22–29)
HDLC SERPL-MCNC: 32 MG/DL
LDLC SERPL CALC-MCNC: 29 MG/DL
MICROALBUMIN UR-MCNC: 16.2 MG/L
MICROALBUMIN/CREAT UR: 7.98 MG/G CR (ref 0–17)
NONHDLC SERPL-MCNC: 70 MG/DL
POTASSIUM SERPL-SCNC: 4.3 MMOL/L (ref 3.4–5.3)
SODIUM SERPL-SCNC: 138 MMOL/L (ref 135–145)
TRIGL SERPL-MCNC: 207 MG/DL

## 2025-09-04 RX ORDER — LOSARTAN POTASSIUM 100 MG/1
100 TABLET ORAL DAILY
Qty: 90 TABLET | Refills: 4 | Status: SHIPPED | OUTPATIENT
Start: 2025-09-04

## 2025-09-04 RX ORDER — CARVEDILOL 12.5 MG/1
12.5 TABLET ORAL 2 TIMES DAILY WITH MEALS
Qty: 180 TABLET | Refills: 4 | Status: SHIPPED | OUTPATIENT
Start: 2025-09-04

## 2025-09-04 RX ORDER — ATORVASTATIN CALCIUM 40 MG/1
40 TABLET, FILM COATED ORAL DAILY
Qty: 90 TABLET | Refills: 4 | Status: SHIPPED | OUTPATIENT
Start: 2025-09-04

## 2025-09-04 RX ORDER — KETOCONAZOLE 20 MG/G
CREAM TOPICAL DAILY PRN
Qty: 15 G | Refills: 1 | Status: SHIPPED | OUTPATIENT
Start: 2025-09-04

## 2025-09-04 RX ORDER — METFORMIN HYDROCHLORIDE 500 MG/1
1000 TABLET, EXTENDED RELEASE ORAL 2 TIMES DAILY WITH MEALS
Qty: 360 TABLET | Refills: 4 | Status: SHIPPED | OUTPATIENT
Start: 2025-09-04

## 2025-09-04 ASSESSMENT — PAIN SCALES - GENERAL: PAINLEVEL_OUTOF10: NO PAIN (0)

## (undated) DEVICE — PREP CHLORAPREP 26ML TINTED ORANGE  260815

## (undated) DEVICE — SU VICRYL 3-0 PS-2 27" UND J427H

## (undated) DEVICE — DRSG KERLIX 4 1/2"X4YDS ROLL 6730

## (undated) DEVICE — CAST PADDING 4" STERILE 9044S

## (undated) DEVICE — GLOVE BIOGEL INDICATOR 7.5 LF 41675

## (undated) DEVICE — SU PROLENE 4-0 PS-2 18" 8682G

## (undated) DEVICE — SU VICRYL 0 CT-2 27" UND J270H

## (undated) DEVICE — SU MONOCRYL 3-0 PS-2 27" Y427H

## (undated) DEVICE — BONE CLEANING TIP INTERPULSE  0210-010-000

## (undated) DEVICE — ESU PENCIL SMOKE EVAC W/ROCKER SWITCH 0703-047-000

## (undated) DEVICE — SU VICRYL 4-0 PS-2 27" UND J426H

## (undated) DEVICE — GLOVE BIOGEL PI ULTRATOUCH G SZ 6.5 42165

## (undated) DEVICE — SYR 10ML FINGER CONTROL W/O NDL 309695

## (undated) DEVICE — KIT ENDO TURNOVER/PROCEDURE CARRY-ON 101822

## (undated) DEVICE — PACK TOTAL JOINT STD LATEX

## (undated) DEVICE — PACK EXTREMITY SOP15EXFSD

## (undated) DEVICE — GLOVE BIOGEL PI SZ 8.0 40880

## (undated) DEVICE — SOL NACL 0.9% IRRIG 3000ML BAG 07972-08

## (undated) DEVICE — DRAPE U SPLIT 74X120" 29440

## (undated) DEVICE — SU VICRYL 0 CT-1 36" J946H

## (undated) DEVICE — GLOVE UNDER INDICATOR PI SZ 7.0 LF 41670

## (undated) DEVICE — DRSG AQUACEL AG HYDROFIBER  3.5X10" 422605

## (undated) DEVICE — SU ETHIBOND 2 V-37 4X30" MX69G

## (undated) DEVICE — DRAPE POUCH INSTRUMENT 1018

## (undated) DEVICE — SU DERMABOND ADVANCED .7ML DNX12

## (undated) DEVICE — DRSG GAUZE 4X4" TRAY

## (undated) DEVICE — SUTURE VICRYL+ 2 27IN CP UNDYED VCP195H

## (undated) DEVICE — HOOD FLYTE 0408-800-000

## (undated) DEVICE — BLADE KNIFE SURG 10 371110

## (undated) DEVICE — DRAPE STERI U 1015

## (undated) DEVICE — SOL NACL 0.9% IRRIG 1000ML BOTTLE 07138-09

## (undated) DEVICE — DRILL BIT BIOM QUICK CONNECTING RING LOCK 3.2X30MM 31-323230

## (undated) DEVICE — BNDG ELASTIC 4"X5YDS UNSTERILE 6611-40

## (undated) DEVICE — SU VICRYL 2-0 CT-1 27" UND J259H

## (undated) DEVICE — NEEDLE ECLIPSE 23X1 1/2 RB TW 303304

## (undated) DEVICE — GOWN IMPERVIOUS SPECIALTY XL/XLONG 39049

## (undated) DEVICE — GLOVE BIOGEL PI SZ 7.5 40875

## (undated) DEVICE — DRAPE C-ARM W/STRAPS 42X72" 07-CA104

## (undated) DEVICE — DRAPE POUCH IRR 1016

## (undated) DEVICE — DRAPE C-ARM MINI 5423

## (undated) DEVICE — CAST PADDING 4" COTTON WEBRIL STERILE 9084S

## (undated) DEVICE — GLOVE BIOGEL PI INDICATOR 8.0 LF 41680

## (undated) DEVICE — GLOVE BIOGEL PI ULTRATOUCH G SZ 8.0 42180

## (undated) DEVICE — BLADE SAW SAGITTAL STRK 25X90X1.19MM HD SYS 6 6125-119-090

## (undated) DEVICE — DRAPE C-ARM PACK 07PK803

## (undated) DEVICE — GLOVE PROTEXIS W/NEU-THERA 7.5  2D73TE75

## (undated) DEVICE — TUBING SUCTION 6"X3/16" N56A

## (undated) DEVICE — SOL WATER IRRIG 1000ML BOTTLE 2F7114

## (undated) DEVICE — SUCTION IRR SYSTEM W/O TIP INTERPULSE HANDPIECE 0210-100-000

## (undated) DEVICE — SOL WATER IRRIG 1000ML BOTTLE 07139-09

## (undated) DEVICE — KIT PATIENT CARE HANA TABLE PROFX SUPINE 6855

## (undated) DEVICE — DRAPE IOBAN INCISE 36X23" 6651EZ

## (undated) DEVICE — GOWN XLG DISP 9545

## (undated) DEVICE — ESU BIPOLAR SEALER AQUAMANTYS 6MM 23-112-1

## (undated) RX ORDER — ONDANSETRON 2 MG/ML
INJECTION INTRAMUSCULAR; INTRAVENOUS
Status: DISPENSED
Start: 2025-01-08

## (undated) RX ORDER — HYDROMORPHONE HYDROCHLORIDE 1 MG/ML
INJECTION, SOLUTION INTRAMUSCULAR; INTRAVENOUS; SUBCUTANEOUS
Status: DISPENSED
Start: 2025-01-08

## (undated) RX ORDER — PROPOFOL 10 MG/ML
INJECTION, EMULSION INTRAVENOUS
Status: DISPENSED
Start: 2025-01-08

## (undated) RX ORDER — FENTANYL CITRATE 50 UG/ML
INJECTION, SOLUTION INTRAMUSCULAR; INTRAVENOUS
Status: DISPENSED
Start: 2025-01-08

## (undated) RX ORDER — FENTANYL CITRATE 50 UG/ML
INJECTION, SOLUTION INTRAMUSCULAR; INTRAVENOUS
Status: DISPENSED
Start: 2017-10-23

## (undated) RX ORDER — GLYCOPYRROLATE 0.2 MG/ML
INJECTION, SOLUTION INTRAMUSCULAR; INTRAVENOUS
Status: DISPENSED
Start: 2025-01-08

## (undated) RX ORDER — FENTANYL CITRATE 50 UG/ML
INJECTION, SOLUTION INTRAMUSCULAR; INTRAVENOUS
Status: DISPENSED
Start: 2024-12-13

## (undated) RX ORDER — BUPIVACAINE HYDROCHLORIDE 5 MG/ML
INJECTION, SOLUTION EPIDURAL; INTRACAUDAL
Status: DISPENSED
Start: 2024-12-13

## (undated) RX ORDER — LIDOCAINE HYDROCHLORIDE 10 MG/ML
INJECTION, SOLUTION EPIDURAL; INFILTRATION; INTRACAUDAL; PERINEURAL
Status: DISPENSED
Start: 2024-12-13

## (undated) RX ORDER — LIDOCAINE HYDROCHLORIDE 10 MG/ML
INJECTION, SOLUTION EPIDURAL; INFILTRATION; INTRACAUDAL; PERINEURAL
Status: DISPENSED
Start: 2025-01-08

## (undated) RX ORDER — DEXAMETHASONE SODIUM PHOSPHATE 10 MG/ML
INJECTION, SOLUTION INTRAMUSCULAR; INTRAVENOUS
Status: DISPENSED
Start: 2025-01-08

## (undated) RX ORDER — EPINEPHRINE 1 MG/ML
INJECTION, SOLUTION INTRAMUSCULAR; SUBCUTANEOUS
Status: DISPENSED
Start: 2025-01-08

## (undated) RX ORDER — FENTANYL CITRATE-0.9 % NACL/PF 10 MCG/ML
PLASTIC BAG, INJECTION (ML) INTRAVENOUS
Status: DISPENSED
Start: 2025-01-08

## (undated) RX ORDER — VANCOMYCIN HYDROCHLORIDE 1 G/20ML
INJECTION, POWDER, LYOPHILIZED, FOR SOLUTION INTRAVENOUS
Status: DISPENSED
Start: 2025-01-08